# Patient Record
Sex: FEMALE | Race: WHITE | Employment: OTHER | URBAN - METROPOLITAN AREA
[De-identification: names, ages, dates, MRNs, and addresses within clinical notes are randomized per-mention and may not be internally consistent; named-entity substitution may affect disease eponyms.]

---

## 2017-01-23 ENCOUNTER — HOSPITAL ENCOUNTER (EMERGENCY)
Facility: HOSPITAL | Age: 62
Discharge: HOME | End: 2017-01-23
Attending: EMERGENCY MEDICINE
Payer: COMMERCIAL

## 2017-01-23 ENCOUNTER — APPOINTMENT (OUTPATIENT)
Dept: GENERAL RADIOLOGY | Facility: HOSPITAL | Age: 62
End: 2017-01-23
Attending: EMERGENCY MEDICINE
Payer: COMMERCIAL

## 2017-01-23 VITALS
TEMPERATURE: 98 F | RESPIRATION RATE: 18 BRPM | WEIGHT: 160 LBS | HEIGHT: 66 IN | SYSTOLIC BLOOD PRESSURE: 161 MMHG | OXYGEN SATURATION: 98 % | BODY MASS INDEX: 25.71 KG/M2 | HEART RATE: 71 BPM | DIASTOLIC BLOOD PRESSURE: 94 MMHG

## 2017-01-23 DIAGNOSIS — S16.1XXA CERVICAL STRAIN, INITIAL ENCOUNTER: ICD-10-CM

## 2017-01-23 DIAGNOSIS — S20.219A CHEST WALL CONTUSION, UNSPECIFIED LATERALITY, INITIAL ENCOUNTER: Primary | ICD-10-CM

## 2017-01-23 LAB
ANION GAP SERPL CALC-SCNC: 7 MMOL/L (ref 0–18)
BASOPHILS # BLD: 0 K/UL (ref 0–0.2)
BASOPHILS NFR BLD: 0 %
BUN SERPL-MCNC: 20 MG/DL (ref 8–20)
BUN/CREAT SERPL: 26.7 (ref 10–20)
CALCIUM SERPL-MCNC: 9 MG/DL (ref 8.5–10.5)
CHLORIDE SERPL-SCNC: 102 MMOL/L (ref 95–110)
CO2 SERPL-SCNC: 29 MMOL/L (ref 22–32)
CREAT SERPL-MCNC: 0.75 MG/DL (ref 0.5–1.5)
EOSINOPHIL # BLD: 0.1 K/UL (ref 0–0.7)
EOSINOPHIL NFR BLD: 1 %
ERYTHROCYTE [DISTWIDTH] IN BLOOD BY AUTOMATED COUNT: 13.1 % (ref 11–15)
GLUCOSE SERPL-MCNC: 119 MG/DL (ref 70–99)
HCT VFR BLD AUTO: 42 % (ref 35–48)
HGB BLD-MCNC: 14 G/DL (ref 12–16)
LYMPHOCYTES # BLD: 1.9 K/UL (ref 1–4)
LYMPHOCYTES NFR BLD: 27 %
MCH RBC QN AUTO: 31.4 PG (ref 27–32)
MCHC RBC AUTO-ENTMCNC: 33.3 G/DL (ref 32–37)
MCV RBC AUTO: 94.3 FL (ref 80–100)
MONOCYTES # BLD: 0.5 K/UL (ref 0–1)
MONOCYTES NFR BLD: 7 %
NEUTROPHILS # BLD AUTO: 4.4 K/UL (ref 1.8–7.7)
NEUTROPHILS NFR BLD: 64 %
OSMOLALITY UR CALC.SUM OF ELEC: 290 MOSM/KG (ref 275–295)
PLATELET # BLD AUTO: 259 K/UL (ref 140–400)
PMV BLD AUTO: 8 FL (ref 7.4–10.3)
POTASSIUM SERPL-SCNC: 3.8 MMOL/L (ref 3.3–5.1)
RBC # BLD AUTO: 4.46 M/UL (ref 3.7–5.4)
SODIUM SERPL-SCNC: 138 MMOL/L (ref 136–144)
TROPONIN I SERPL-MCNC: 0 NG/ML (ref ?–0.03)
WBC # BLD AUTO: 6.9 K/UL (ref 4–11)

## 2017-01-23 PROCEDURE — 80048 BASIC METABOLIC PNL TOTAL CA: CPT | Performed by: EMERGENCY MEDICINE

## 2017-01-23 PROCEDURE — 71020 XR CHEST PA + LAT CHEST (CPT=71020): CPT | Performed by: RADIOLOGY

## 2017-01-23 PROCEDURE — 99284 EMERGENCY DEPT VISIT MOD MDM: CPT

## 2017-01-23 PROCEDURE — 93010 ELECTROCARDIOGRAM REPORT: CPT | Performed by: EMERGENCY MEDICINE

## 2017-01-23 PROCEDURE — 85025 COMPLETE CBC W/AUTO DIFF WBC: CPT | Performed by: EMERGENCY MEDICINE

## 2017-01-23 PROCEDURE — 36415 COLL VENOUS BLD VENIPUNCTURE: CPT

## 2017-01-23 PROCEDURE — 84484 ASSAY OF TROPONIN QUANT: CPT | Performed by: EMERGENCY MEDICINE

## 2017-01-23 PROCEDURE — 99285 EMERGENCY DEPT VISIT HI MDM: CPT

## 2017-01-23 PROCEDURE — 72040 X-RAY EXAM NECK SPINE 2-3 VW: CPT | Performed by: RADIOLOGY

## 2017-01-23 PROCEDURE — 93005 ELECTROCARDIOGRAM TRACING: CPT

## 2017-01-23 RX ORDER — IBUPROFEN 600 MG/1
600 TABLET ORAL ONCE
Status: COMPLETED | OUTPATIENT
Start: 2017-01-23 | End: 2017-01-23
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
## 2017-02-13 ENCOUNTER — OFFICE VISIT (OUTPATIENT)
Dept: INTERNAL MEDICINE CLINIC | Facility: CLINIC | Age: 62
End: 2017-02-13

## 2017-02-13 VITALS
DIASTOLIC BLOOD PRESSURE: 88 MMHG | WEIGHT: 162 LBS | BODY MASS INDEX: 26.03 KG/M2 | TEMPERATURE: 98 F | SYSTOLIC BLOOD PRESSURE: 148 MMHG | HEART RATE: 72 BPM | OXYGEN SATURATION: 96 % | HEIGHT: 66 IN

## 2017-02-13 DIAGNOSIS — S20.211D CHEST WALL CONTUSION, RIGHT, SUBSEQUENT ENCOUNTER: ICD-10-CM
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
## 2017-02-13 DIAGNOSIS — S46.911D RIGHT SHOULDER STRAIN, SUBSEQUENT ENCOUNTER: ICD-10-CM

## 2017-02-13 DIAGNOSIS — E78.00 HYPERCHOLESTEROLEMIA: ICD-10-CM

## 2017-02-13 DIAGNOSIS — S16.1XXD CERVICAL STRAIN, SUBSEQUENT ENCOUNTER: ICD-10-CM

## 2017-02-13 PROCEDURE — 99212 OFFICE O/P EST SF 10 MIN: CPT | Performed by: INTERNAL MEDICINE

## 2017-02-13 PROCEDURE — 99214 OFFICE O/P EST MOD 30 MIN: CPT | Performed by: INTERNAL MEDICINE
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
## 2017-02-20 ENCOUNTER — APPOINTMENT (OUTPATIENT)
Dept: LAB | Age: 62
End: 2017-02-20
Attending: INTERNAL MEDICINE
Payer: COMMERCIAL

## 2017-02-20 DIAGNOSIS — E78.00 HYPERCHOLESTEROLEMIA: ICD-10-CM

## 2017-02-20 LAB
ALBUMIN SERPL BCP-MCNC: 4 G/DL (ref 3.5–4.8)
ALBUMIN/GLOB SERPL: 1.6 {RATIO} (ref 1–2)
ALP SERPL-CCNC: 56 U/L (ref 32–100)
ALT SERPL-CCNC: 19 U/L (ref 14–54)
ANION GAP SERPL CALC-SCNC: 8 MMOL/L (ref 0–18)
AST SERPL-CCNC: 19 U/L (ref 15–41)
BACTERIA UR QL AUTO: NEGATIVE /HPF
BILIRUB SERPL-MCNC: 0.8 MG/DL (ref 0.3–1.2)
BILIRUB UR QL: NEGATIVE
BUN SERPL-MCNC: 13 MG/DL (ref 8–20)
BUN/CREAT SERPL: 15.3 (ref 10–20)
CALCIUM SERPL-MCNC: 9.1 MG/DL (ref 8.5–10.5)
CHLORIDE SERPL-SCNC: 104 MMOL/L (ref 95–110)
CHOLEST SERPL-MCNC: 244 MG/DL (ref 110–200)
CO2 SERPL-SCNC: 27 MMOL/L (ref 22–32)
COLOR UR: YELLOW
CREAT SERPL-MCNC: 0.85 MG/DL (ref 0.5–1.5)
GLOBULIN PLAS-MCNC: 2.5 G/DL (ref 2.5–3.7)
GLUCOSE SERPL-MCNC: 93 MG/DL (ref 70–99)
GLUCOSE UR-MCNC: NEGATIVE MG/DL
HDLC SERPL-MCNC: 68 MG/DL
HGB UR QL STRIP.AUTO: NEGATIVE
LDLC SERPL CALC-MCNC: 161 MG/DL (ref 0–99)
LEUKOCYTE ESTERASE UR QL STRIP.AUTO: NEGATIVE
NITRITE UR QL STRIP.AUTO: NEGATIVE
NONHDLC SERPL-MCNC: 176 MG/DL
OSMOLALITY UR CALC.SUM OF ELEC: 288 MOSM/KG (ref 275–295)
PH UR: 6 [PH] (ref 5–8)
POTASSIUM SERPL-SCNC: 4.2 MMOL/L (ref 3.3–5.1)
PROT SERPL-MCNC: 6.5 G/DL (ref 5.9–8.4)
PROT UR-MCNC: 30 MG/DL
RBC #/AREA URNS AUTO: 1 /HPF
SODIUM SERPL-SCNC: 139 MMOL/L (ref 136–144)
SP GR UR STRIP: 1.02 (ref 1–1.03)
TRIGL SERPL-MCNC: 74 MG/DL (ref 1–149)
TSH SERPL-ACNC: 2.47 UIU/ML (ref 0.34–5.6)
UROBILINOGEN UR STRIP-ACNC: <2
VIT C UR-MCNC: 40 MG/DL
WBC #/AREA URNS AUTO: <1 /HPF

## 2017-02-20 PROCEDURE — 36415 COLL VENOUS BLD VENIPUNCTURE: CPT

## 2017-02-20 PROCEDURE — 80061 LIPID PANEL: CPT

## 2017-02-20 PROCEDURE — 81001 URINALYSIS AUTO W/SCOPE: CPT

## 2017-02-20 PROCEDURE — 84443 ASSAY THYROID STIM HORMONE: CPT

## 2017-02-20 PROCEDURE — 80053 COMPREHEN METABOLIC PANEL: CPT

## 2017-02-21 ENCOUNTER — TELEPHONE (OUTPATIENT)
Dept: INTERNAL MEDICINE CLINIC | Facility: CLINIC | Age: 62
End: 2017-02-21

## 2017-02-21 NOTE — TELEPHONE ENCOUNTER
To nursing,   please tell patient lab done on 2/20/17 CMP TSH lipid UA–results are ok except a minor amount of protein in urine (30 mg/dl) and elevated cholesterol. I will just repeat UA when I see her on 3/13/17.    Cholesterol is elevated at 244 with lynnette

## 2017-03-01 ENCOUNTER — HOSPITAL ENCOUNTER (OUTPATIENT)
Dept: MAMMOGRAPHY | Facility: HOSPITAL | Age: 62
Discharge: HOME | End: 2017-03-01
Attending: OBSTETRICS & GYNECOLOGY
Payer: COMMERCIAL

## 2017-03-01 DIAGNOSIS — Z12.31 VISIT FOR SCREENING MAMMOGRAM: ICD-10-CM

## 2017-03-01 PROCEDURE — 77067 SCR MAMMO BI INCL CAD: CPT

## 2017-03-13 ENCOUNTER — OFFICE VISIT (OUTPATIENT)
Dept: INTERNAL MEDICINE CLINIC | Facility: CLINIC | Age: 62
End: 2017-03-13

## 2017-03-13 ENCOUNTER — APPOINTMENT (OUTPATIENT)
Dept: LAB | Age: 62
End: 2017-03-13
Attending: INTERNAL MEDICINE
Payer: COMMERCIAL

## 2017-03-13 ENCOUNTER — TELEPHONE (OUTPATIENT)
Dept: INTERNAL MEDICINE CLINIC | Facility: CLINIC | Age: 62
End: 2017-03-13

## 2017-03-13 VITALS
SYSTOLIC BLOOD PRESSURE: 160 MMHG | OXYGEN SATURATION: 96 % | HEIGHT: 66 IN | HEART RATE: 71 BPM | BODY MASS INDEX: 26.03 KG/M2 | DIASTOLIC BLOOD PRESSURE: 90 MMHG | TEMPERATURE: 98 F | WEIGHT: 162 LBS

## 2017-03-13 DIAGNOSIS — R82.90 ABNORMAL URINALYSIS: ICD-10-CM

## 2017-03-13 DIAGNOSIS — S46.911D RIGHT SHOULDER STRAIN, SUBSEQUENT ENCOUNTER: ICD-10-CM

## 2017-03-13 DIAGNOSIS — S16.1XXD CERVICAL STRAIN, SUBSEQUENT ENCOUNTER: ICD-10-CM

## 2017-03-13 DIAGNOSIS — E78.00 HYPERCHOLESTEROLEMIA: ICD-10-CM
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
## 2017-03-13 LAB
BILIRUB UR QL: NEGATIVE
COLOR UR: YELLOW
GLUCOSE UR-MCNC: NEGATIVE MG/DL
HGB UR QL STRIP.AUTO: NEGATIVE
LEUKOCYTE ESTERASE UR QL STRIP.AUTO: NEGATIVE
NITRITE UR QL STRIP.AUTO: NEGATIVE
PH UR: 6 [PH] (ref 5–8)
PROT UR-MCNC: NEGATIVE MG/DL
RBC #/AREA URNS AUTO: 2 /HPF
SP GR UR STRIP: 1.02 (ref 1–1.03)
UROBILINOGEN UR STRIP-ACNC: <2
VIT C UR-MCNC: 40 MG/DL
WBC #/AREA URNS AUTO: 1 /HPF

## 2017-03-13 PROCEDURE — 99212 OFFICE O/P EST SF 10 MIN: CPT | Performed by: INTERNAL MEDICINE

## 2017-03-13 PROCEDURE — 81003 URINALYSIS AUTO W/O SCOPE: CPT

## 2017-03-13 PROCEDURE — 99213 OFFICE O/P EST LOW 20 MIN: CPT | Performed by: INTERNAL MEDICINE

## 2017-03-13 NOTE — TELEPHONE ENCOUNTER
To nursing, please tell patient results of urinalysis done today 3/13/17–results are okay. No protein in the urine. Thanks.
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
## 2018-03-25 ENCOUNTER — HOSPITAL ENCOUNTER (OUTPATIENT)
Dept: MAMMOGRAPHY | Facility: HOSPITAL | Age: 63
Discharge: HOME | End: 2018-03-25
Attending: OBSTETRICS & GYNECOLOGY
Payer: COMMERCIAL

## 2018-03-25 DIAGNOSIS — Z12.31 VISIT FOR SCREENING MAMMOGRAM: ICD-10-CM

## 2018-03-25 PROCEDURE — 77067 SCR MAMMO BI INCL CAD: CPT | Performed by: OBSTETRICS & GYNECOLOGY

## 2018-04-25 ENCOUNTER — OFFICE VISIT (OUTPATIENT)
Dept: INTERNAL MEDICINE CLINIC | Facility: CLINIC | Age: 63
End: 2018-04-25

## 2018-04-25 VITALS
DIASTOLIC BLOOD PRESSURE: 98 MMHG | BODY MASS INDEX: 27.93 KG/M2 | OXYGEN SATURATION: 98 % | TEMPERATURE: 99 F | SYSTOLIC BLOOD PRESSURE: 152 MMHG | HEART RATE: 79 BPM | WEIGHT: 173.81 LBS | HEIGHT: 66 IN

## 2018-04-25 DIAGNOSIS — E78.00 HYPERCHOLESTEROLEMIA: ICD-10-CM

## 2018-04-25 DIAGNOSIS — I10 HYPERTENSION, ESSENTIAL: ICD-10-CM

## 2018-04-25 DIAGNOSIS — Z00.00 ANNUAL PHYSICAL EXAM: Primary | ICD-10-CM

## 2018-04-25 DIAGNOSIS — H91.93 HEARING DIFFICULTY OF BOTH EARS: ICD-10-CM

## 2018-04-25 PROCEDURE — 99396 PREV VISIT EST AGE 40-64: CPT | Performed by: INTERNAL MEDICINE

## 2018-04-25 RX ORDER — AMLODIPINE BESYLATE 5 MG/1
5 TABLET ORAL DAILY
Qty: 90 TABLET | Refills: 3 | Status: SHIPPED | OUTPATIENT
Start: 2018-04-25 | End: 2018-05-23

## 2018-04-25 NOTE — PROGRESS NOTES
Randi Hubbard is a 57year old female who presents for     Annual physical    Elevated blood pressure--  Saw Dr Ness Ortiz last month and pt notes her BP there was 188/92. She recom pt see her PCP. Pt has been checking home BPs. 156/90s.  Her mom and brother w Cigarettes  Smokeless tobacco: Never Used                      Alcohol use:  Yes              Comment: 2 DRINKS A DAY         Allergies:    Bactrim [Sulfametho*    Rash    Review of systems:  Constitutional:  No fever, loss of appetite or unintentional weig 9/2013--recom shingrix-VIS provided. Flu shot in fall.         Lab 2/20/17 CMP TSH lipid UA– ok exc UA with 30 mg/dl protein  and . CBC nl ER 1/23/17. repeat UA 3/13/17-ok. Lab orders for -cbc cmp tsh lipid ua w reflex. Ret in 1 mo.  Bring home

## 2018-05-02 ENCOUNTER — LAB ENCOUNTER (OUTPATIENT)
Dept: LAB | Age: 63
End: 2018-05-02
Attending: INTERNAL MEDICINE
Payer: COMMERCIAL

## 2018-05-02 DIAGNOSIS — Z00.00 ANNUAL PHYSICAL EXAM: ICD-10-CM

## 2018-05-02 PROCEDURE — 81003 URINALYSIS AUTO W/O SCOPE: CPT | Performed by: INTERNAL MEDICINE

## 2018-05-02 PROCEDURE — 36415 COLL VENOUS BLD VENIPUNCTURE: CPT

## 2018-05-02 PROCEDURE — 84443 ASSAY THYROID STIM HORMONE: CPT

## 2018-05-02 PROCEDURE — 85025 COMPLETE CBC W/AUTO DIFF WBC: CPT

## 2018-05-02 PROCEDURE — 80053 COMPREHEN METABOLIC PANEL: CPT

## 2018-05-02 PROCEDURE — 80061 LIPID PANEL: CPT

## 2018-05-03 ENCOUNTER — TELEPHONE (OUTPATIENT)
Dept: INTERNAL MEDICINE CLINIC | Facility: CLINIC | Age: 63
End: 2018-05-03

## 2018-05-03 NOTE — TELEPHONE ENCOUNTER
To nursing, please tell pt lab done on 5/2/18--cbc cmp tsh lipid ua-results ok. See me 5/23/18 as planned. Thanks.

## 2018-05-03 NOTE — TELEPHONE ENCOUNTER
calledpatient , spoke with spouse pr hipaa and relayed DR. CHEEMA message- verbalized understanding and will relay message to spouse

## 2018-05-12 ENCOUNTER — TELEPHONE (OUTPATIENT)
Dept: INTERNAL MEDICINE CLINIC | Facility: CLINIC | Age: 63
End: 2018-05-12

## 2018-05-12 NOTE — TELEPHONE ENCOUNTER
To nursing, please tell patient personally I received results of the colo-guard stool test she sent in from 5/2/18. The colo-guard test is positive. A positive result could indicate the presence of colorectal cancer or precancer.   It is important that

## 2018-05-14 NOTE — TELEPHONE ENCOUNTER
Called and Relayed MD's message to patient---verbalized understanding. Contact information given over the phone for DR. Cazares and contact information provided via [facility] for Dr. Criss Avendaño to schedule colonoscopy asap

## 2018-05-21 ENCOUNTER — OFFICE VISIT (OUTPATIENT)
Dept: AUDIOLOGY | Facility: CLINIC | Age: 63
End: 2018-05-21

## 2018-05-21 DIAGNOSIS — H90.3 SENSORINEURAL HEARING LOSS, BILATERAL: Primary | ICD-10-CM

## 2018-05-21 PROCEDURE — 92567 TYMPANOMETRY: CPT | Performed by: AUDIOLOGIST

## 2018-05-21 PROCEDURE — 92557 COMPREHENSIVE HEARING TEST: CPT | Performed by: AUDIOLOGIST

## 2018-05-21 NOTE — PATIENT INSTRUCTIONS
How Hearing Aids Can Help You    If you’re losing your hearing, it can be frustrating: But, hearing aids can help you hear what Ameena Peng been missing. Not everyone who has hearing loss needs hearing aids.  But if your hearing loss is keeping you from commun © 5164-2017 [address] [facility] [place] [facility] [address]. All rights reserved. This information is not intended as a substitute for professional medical care. Always follow your healthcare professional's instructions.

## 2018-05-21 NOTE — PROGRESS NOTES
AUDIOGRAM     Nina Wright was referred for testing by Tabby Erickson for decreased hearing in both ears. 9/28/1955  [id number]      History  Mrs. Laina Noel is here today for an audiological evaluation on referral from Dr. Brandt Brown.   She repor hearing aids. Once we receive that information, we will call and see if she'd like to move forward with a hearing aid evaluation.     5/21/2018  Ad Llamas

## 2018-05-23 ENCOUNTER — TELEPHONE (OUTPATIENT)
Dept: INTERNAL MEDICINE CLINIC | Facility: CLINIC | Age: 63
End: 2018-05-23

## 2018-05-23 ENCOUNTER — OFFICE VISIT (OUTPATIENT)
Dept: INTERNAL MEDICINE CLINIC | Facility: CLINIC | Age: 63
End: 2018-05-23

## 2018-05-23 VITALS
WEIGHT: 175 LBS | HEIGHT: 66 IN | BODY MASS INDEX: 28.13 KG/M2 | TEMPERATURE: 99 F | HEART RATE: 88 BPM | DIASTOLIC BLOOD PRESSURE: 76 MMHG | SYSTOLIC BLOOD PRESSURE: 130 MMHG

## 2018-05-23 DIAGNOSIS — I10 HYPERTENSION, ESSENTIAL: Primary | ICD-10-CM

## 2018-05-23 DIAGNOSIS — R19.5 POSITIVE COLORECTAL CANCER SCREENING USING COLOGUARD TEST: ICD-10-CM

## 2018-05-23 DIAGNOSIS — E78.00 HYPERCHOLESTEROLEMIA: ICD-10-CM

## 2018-05-23 PROCEDURE — 99213 OFFICE O/P EST LOW 20 MIN: CPT | Performed by: INTERNAL MEDICINE

## 2018-05-23 PROCEDURE — 99212 OFFICE O/P EST SF 10 MIN: CPT | Performed by: INTERNAL MEDICINE

## 2018-05-23 RX ORDER — AMLODIPINE BESYLATE 5 MG/1
7.5 TABLET ORAL DAILY
Qty: 135 TABLET | Refills: 3 | Status: SHIPPED | OUTPATIENT
Start: 2018-05-23 | End: 2019-05-18

## 2018-05-23 NOTE — TELEPHONE ENCOUNTER
Ender Fearing from [facility] office was returning Sakshiss Hair call

## 2018-05-23 NOTE — PROGRESS NOTES
Jovanna Velazquez is a 57year old female who presents for     1 mo check.      HTN-tolerating addition norvasc 5 mg daily. Home BPs -14 readings brought in. Of 14 readings 5 are in the 140/90 range. The other readings are 120s to 130s over 80s.       Saw Packs/day: 0.00      Years: 5.00         Types: Cigarettes  Smokeless tobacco: Never Used                      Alcohol use:  Yes              Comment: 2 DRINKS A DAY         Allergies:    Bactrim [Sulfametho*    RA 3/13/17-ok. Lab 5/2/18--cbc cmp tsh lipid ua-results ok     Ret in 6 mo.      Patient expresses understanding of above issues and plan.          Current Outpatient Prescriptions:   AmLODIPine Besylate 5 MG Oral Tab Take 1.5 tablets (7.5 mg total) by mouth

## 2018-05-23 NOTE — TELEPHONE ENCOUNTER
Spoke to Carmen at Dr. Sung Peak office. They will  Move up colonoscopy to June 14th arrive at [address] [place] [address] office. Patient notified and agrees.

## 2018-06-07 ENCOUNTER — TELEPHONE (OUTPATIENT)
Dept: AUDIOLOGY | Facility: CLINIC | Age: 63
End: 2018-06-07

## 2018-06-14 PROCEDURE — 88305 TISSUE EXAM BY PATHOLOGIST: CPT | Performed by: INTERNAL MEDICINE

## 2018-10-26 ENCOUNTER — TELEPHONE (OUTPATIENT)
Dept: INTERNAL MEDICINE CLINIC | Facility: CLINIC | Age: 63
End: 2018-10-26

## 2018-10-26 ENCOUNTER — HOSPITAL ENCOUNTER (OUTPATIENT)
Dept: GENERAL RADIOLOGY | Facility: HOSPITAL | Age: 63
Discharge: HOME | End: 2018-10-26
Attending: INTERNAL MEDICINE
Payer: MEDICARE

## 2018-10-26 ENCOUNTER — HOSPITAL ENCOUNTER (OUTPATIENT)
Dept: ULTRASOUND IMAGING | Facility: HOSPITAL | Age: 63
Discharge: HOME | End: 2018-10-26
Attending: INTERNAL MEDICINE | Admitting: INTERNAL MEDICINE
Payer: MEDICARE

## 2018-10-26 ENCOUNTER — HOSPITAL ENCOUNTER (OUTPATIENT)
Dept: ULTRASOUND IMAGING | Facility: HOSPITAL | Age: 63
Discharge: HOME | End: 2018-10-26
Attending: INTERNAL MEDICINE
Payer: MEDICARE

## 2018-10-26 ENCOUNTER — OFFICE VISIT (OUTPATIENT)
Dept: INTERNAL MEDICINE CLINIC | Facility: CLINIC | Age: 63
End: 2018-10-26
Payer: MEDICARE

## 2018-10-26 VITALS
HEIGHT: 66 IN | WEIGHT: 180 LBS | BODY MASS INDEX: 28.93 KG/M2 | DIASTOLIC BLOOD PRESSURE: 70 MMHG | TEMPERATURE: 98 F | SYSTOLIC BLOOD PRESSURE: 120 MMHG | HEART RATE: 76 BPM

## 2018-10-26 DIAGNOSIS — M25.562 ACUTE PAIN OF LEFT KNEE: Primary | ICD-10-CM

## 2018-10-26 DIAGNOSIS — I10 HYPERTENSION, ESSENTIAL: ICD-10-CM

## 2018-10-26 DIAGNOSIS — M25.462 PAIN AND SWELLING OF LEFT KNEE: ICD-10-CM

## 2018-10-26 DIAGNOSIS — M25.562 PAIN AND SWELLING OF LEFT KNEE: ICD-10-CM

## 2018-10-26 DIAGNOSIS — M25.562 ACUTE PAIN OF LEFT KNEE: ICD-10-CM

## 2018-10-26 DIAGNOSIS — L98.9 SKIN LESION OF LEFT LEG: ICD-10-CM

## 2018-10-26 PROCEDURE — G0463 HOSPITAL OUTPT CLINIC VISIT: HCPCS | Performed by: INTERNAL MEDICINE

## 2018-10-26 PROCEDURE — 99214 OFFICE O/P EST MOD 30 MIN: CPT | Performed by: INTERNAL MEDICINE

## 2018-10-26 PROCEDURE — 93971 EXTREMITY STUDY: CPT | Performed by: INTERNAL MEDICINE

## 2018-10-26 PROCEDURE — 73564 X-RAY EXAM KNEE 4 OR MORE: CPT | Performed by: INTERNAL MEDICINE

## 2018-10-26 RX ORDER — METHYLPREDNISOLONE 4 MG/1
TABLET ORAL
Qty: 1 KIT | Refills: 0 | Status: SHIPPED | OUTPATIENT
Start: 2018-10-26 | End: 2018-12-20

## 2018-10-26 NOTE — TELEPHONE ENCOUNTER
Pilar Jordan / Registration calling for new Diagnonsis code for U/S Venous Dopp leg, the current code will not pass Medicare  Code R06.9 will pass. Pt is there now.  Tasked to nursing high

## 2018-10-26 NOTE — TELEPHONE ENCOUNTER
I discussed with patient when ultrasound called with results–Venous Doppler left leg today 10/26/18 no DVT, probable Baker's cyst 4.1 cm. She will go ahead and see Dr. Nannette Murillo.

## 2018-10-26 NOTE — PROGRESS NOTES
Shantanu Hutson is a 70year old female who presents for     Check at 5 months. L knee pain for 2 wks. \"it throbs\" medially and in back of knee. The rest of knee is \"sore. \"  No fall or injury. Worse w walking and going sitting to standing.  No giv Brother    • Other (no children) Other    • Stroke Maternal Grandmother          age 64      Social History:   Social History    Tobacco Use      Smoking status: Former Smoker        Years: 5.00        Types: Cigarettes      Smokeless tobacco: Never Us x6, diverticulosis and melanosis coli. To repeat at 5 yrs. Gyn Dr. Al Maciel. She prescribes estrogen patch and lexapro for depression. Laurie Cr / Herev Win by Dr. Al Maciel. Vaccines-tdap 9/09. Had zostavax vacc 9/2013--recom shingrix.  Flu shot had in fall 2018

## 2018-10-29 NOTE — TELEPHONE ENCOUNTER
To nursing, please tell patient x-ray left knee 10/26/18 shows small amount of fluid in the knee. Go ahead and see Dr. Ab Kenny as we discussed. Thanks. Note to self–x-ray left knee 10/26/18–small left suprapatellar effusion.

## 2018-12-19 ENCOUNTER — TELEPHONE (OUTPATIENT)
Dept: INTERNAL MEDICINE CLINIC | Facility: CLINIC | Age: 63
End: 2018-12-19

## 2018-12-19 NOTE — TELEPHONE ENCOUNTER
I spoke with patient and she says she woke up with dizziness and felt like the room was spinning. She has felt this sensation on and off throughout the day. She has been nauseated at times. Patient says this has never happened before. She has not fallen.  Matt Blunt

## 2018-12-19 NOTE — TELEPHONE ENCOUNTER
Pt. Called stating when she woke up today she had a bad bout of dizziness and nausea. She laid back down. Still having bouts of dizziness. Asking to be seen.

## 2018-12-20 ENCOUNTER — OFFICE VISIT (OUTPATIENT)
Dept: INTERNAL MEDICINE CLINIC | Facility: CLINIC | Age: 63
End: 2018-12-20
Payer: MEDICARE

## 2018-12-20 VITALS
DIASTOLIC BLOOD PRESSURE: 70 MMHG | SYSTOLIC BLOOD PRESSURE: 126 MMHG | HEART RATE: 72 BPM | TEMPERATURE: 98 F | WEIGHT: 177 LBS | BODY MASS INDEX: 29 KG/M2

## 2018-12-20 DIAGNOSIS — I10 HYPERTENSION, ESSENTIAL: ICD-10-CM

## 2018-12-20 DIAGNOSIS — H91.93 HEARING DIFFICULTY OF BOTH EARS: ICD-10-CM

## 2018-12-20 DIAGNOSIS — H81.10 BENIGN PAROXYSMAL POSITIONAL VERTIGO, UNSPECIFIED LATERALITY: Primary | ICD-10-CM

## 2018-12-20 PROCEDURE — G0463 HOSPITAL OUTPT CLINIC VISIT: HCPCS | Performed by: INTERNAL MEDICINE

## 2018-12-20 PROCEDURE — 99214 OFFICE O/P EST MOD 30 MIN: CPT | Performed by: INTERNAL MEDICINE

## 2018-12-20 NOTE — PROGRESS NOTES
Nura Marie is a 70year old female.     HPI:   Patient presents with:  Dizziness      69 y/o F with 1 d h/o dizziness; occurred when awoke from bed; +nausea; lasted for a few hours; no emesis; no weakness to arms or legs; no diplopia; no sinus or niranjan MG Oral Tab Take 1.5 tablets (7.5 mg total) by mouth daily. Disp: 135 tablet Rfl: 3   DHA-EPA-Coenzyme Q10-Vitamin E (COQ-10 & FISH OIL OR) Take 1 tablet by mouth daily.  Disp:  Rfl:    Glucosamine-Chondroitin (GLUCOSAMINE CHONDR COMPLEX OR) Take 1 tablet b seconds; no lateralization         ASSESSMENT/PLAN:   BPPV  Advise meclizine 25 mg po TID prn    HTN  On Norvasc 7.5 mg op qD    SNHL  saw audiologist Chris Tariq 5/21/18-sensorineural HL--she said exc candidate for HA's.          Orders This Visit:  No or

## 2019-05-24 DIAGNOSIS — I10 ESSENTIAL HYPERTENSION: Primary | ICD-10-CM

## 2019-05-24 DIAGNOSIS — E78.00 HYPERCHOLESTEROLEMIA: ICD-10-CM

## 2019-05-24 RX ORDER — AMLODIPINE BESYLATE 5 MG/1
TABLET ORAL
Qty: 135 TABLET | Refills: 0 | Status: SHIPPED | OUTPATIENT
Start: 2019-05-24 | End: 2019-05-31

## 2019-05-24 NOTE — TELEPHONE ENCOUNTER
Contacted patient  Schedule appt with Dr Lyman Doing on 5/31  Can pt get a refill sent to pharmacy today    Routed message back to rx

## 2019-05-24 NOTE — TELEPHONE ENCOUNTER
Rx sent for 90 day supply. Patient also due for labs. Labs pended for Dr. Kennedi Reno review.      To Dr. Arthor Bernheim.

## 2019-05-24 NOTE — TELEPHONE ENCOUNTER
Patient notified of Rx and fasting lab orders via cell VM per HIPAA. Instructed patient to call with questions.

## 2019-05-24 NOTE — TELEPHONE ENCOUNTER
To nursing, please tell patient orders are in the system for her to do fasting blood and urine tests before 5/31/19 visit–CBC CMP TSH lipid UA with reflex. Thanks. - CBC WITH DIFFERENTIAL WITH PLATELET; Future  - COMP METABOLIC PANEL (14);  Future  -

## 2019-05-29 ENCOUNTER — LAB ENCOUNTER (OUTPATIENT)
Dept: LAB | Age: 64
End: 2019-05-29
Attending: INTERNAL MEDICINE
Payer: MEDICARE

## 2019-05-29 DIAGNOSIS — I10 ESSENTIAL HYPERTENSION: ICD-10-CM

## 2019-05-29 PROCEDURE — 81001 URINALYSIS AUTO W/SCOPE: CPT | Performed by: INTERNAL MEDICINE

## 2019-05-29 PROCEDURE — 84443 ASSAY THYROID STIM HORMONE: CPT

## 2019-05-29 PROCEDURE — 85025 COMPLETE CBC W/AUTO DIFF WBC: CPT

## 2019-05-29 PROCEDURE — 80053 COMPREHEN METABOLIC PANEL: CPT

## 2019-05-29 PROCEDURE — 36415 COLL VENOUS BLD VENIPUNCTURE: CPT

## 2019-05-29 PROCEDURE — 80061 LIPID PANEL: CPT

## 2019-05-31 ENCOUNTER — OFFICE VISIT (OUTPATIENT)
Dept: INTERNAL MEDICINE CLINIC | Facility: CLINIC | Age: 64
End: 2019-05-31
Payer: MEDICARE

## 2019-05-31 VITALS
DIASTOLIC BLOOD PRESSURE: 74 MMHG | SYSTOLIC BLOOD PRESSURE: 122 MMHG | TEMPERATURE: 99 F | HEIGHT: 66 IN | HEART RATE: 76 BPM | BODY MASS INDEX: 26.52 KG/M2 | WEIGHT: 165 LBS

## 2019-05-31 DIAGNOSIS — F32.A MILD DEPRESSION: ICD-10-CM

## 2019-05-31 DIAGNOSIS — M17.12 PRIMARY OSTEOARTHRITIS OF LEFT KNEE: ICD-10-CM

## 2019-05-31 DIAGNOSIS — Z12.31 VISIT FOR SCREENING MAMMOGRAM: ICD-10-CM

## 2019-05-31 DIAGNOSIS — I10 HYPERTENSION, ESSENTIAL: Primary | ICD-10-CM

## 2019-05-31 PROCEDURE — 90670 PCV13 VACCINE IM: CPT | Performed by: INTERNAL MEDICINE

## 2019-05-31 PROCEDURE — 99214 OFFICE O/P EST MOD 30 MIN: CPT | Performed by: INTERNAL MEDICINE

## 2019-05-31 PROCEDURE — G0463 HOSPITAL OUTPT CLINIC VISIT: HCPCS | Performed by: INTERNAL MEDICINE

## 2019-05-31 PROCEDURE — G0009 ADMIN PNEUMOCOCCAL VACCINE: HCPCS | Performed by: INTERNAL MEDICINE

## 2019-05-31 RX ORDER — AMLODIPINE BESYLATE 5 MG/1
TABLET ORAL
Qty: 90 TABLET | Refills: 3 | Status: SHIPPED | OUTPATIENT
Start: 2019-05-31 | End: 2019-08-02

## 2019-05-31 RX ORDER — ESCITALOPRAM OXALATE 10 MG/1
10 TABLET ORAL
Qty: 90 TABLET | Refills: 3 | Status: SHIPPED | OUTPATIENT
Start: 2019-05-31 | End: 2020-06-03

## 2019-05-31 NOTE — PROGRESS NOTES
Alba Lassiter is a 70year old female who presents for     Check at 7 months.     HTN-home BPs not checked. Lost 20# since Feb by Darron Carson. Would like to get off  amlodipine 7.5 mg daily.      L knee pain is better.    x-ray left knee 10/26/18–small Grandmother          age 62      Social History:   Social History    Tobacco Use      Smoking status: Former Smoker        Years: 5.00        Types: Cigarettes      Smokeless tobacco: Never Used    Alcohol use: Yes      Comment: 2 DRINKS A DAY    Drug on 5/2/18.     Hearing difficulty--audiologist Johanna Zuniga 5/21/18-sensorineural HL & candidate for HA's.     Depression, mild--on lexapro 10 mg daily for depression since age 44 when had hyst. Wants to continue and asks renewal here (instead of Dr. Samir Lewis wh Darryn Uribe MD  5/31/2019

## 2019-06-16 ENCOUNTER — HOSPITAL ENCOUNTER (OUTPATIENT)
Dept: MAMMOGRAPHY | Facility: HOSPITAL | Age: 64
Discharge: HOME | End: 2019-06-16
Attending: INTERNAL MEDICINE
Payer: MEDICARE

## 2019-06-16 DIAGNOSIS — Z12.31 VISIT FOR SCREENING MAMMOGRAM: ICD-10-CM

## 2019-06-16 PROCEDURE — 77063 BREAST TOMOSYNTHESIS BI: CPT | Performed by: INTERNAL MEDICINE

## 2019-06-16 PROCEDURE — 77067 SCR MAMMO BI INCL CAD: CPT | Performed by: INTERNAL MEDICINE

## 2019-08-02 ENCOUNTER — OFFICE VISIT (OUTPATIENT)
Dept: DERMATOLOGY CLINIC | Facility: CLINIC | Age: 64
End: 2019-08-02
Payer: MEDICARE

## 2019-08-02 DIAGNOSIS — D23.4 BENIGN NEOPLASM OF SCALP AND SKIN OF NECK: ICD-10-CM

## 2019-08-02 DIAGNOSIS — D23.60 BENIGN NEOPLASM OF SKIN OF UPPER LIMB, INCLUDING SHOULDER, UNSPECIFIED LATERALITY: ICD-10-CM

## 2019-08-02 DIAGNOSIS — D23.70 BENIGN NEOPLASM OF SKIN OF LOWER LIMB, INCLUDING HIP, UNSPECIFIED LATERALITY: ICD-10-CM

## 2019-08-02 DIAGNOSIS — D48.5 NEOPLASM OF UNCERTAIN BEHAVIOR OF SKIN: Primary | ICD-10-CM

## 2019-08-02 DIAGNOSIS — D23.30 BENIGN NEOPLASM OF SKIN OF FACE: ICD-10-CM

## 2019-08-02 DIAGNOSIS — D23.5 BENIGN NEOPLASM OF SKIN OF TRUNK, EXCEPT SCROTUM: ICD-10-CM

## 2019-08-02 PROCEDURE — 11102 TANGNTL BX SKIN SINGLE LES: CPT | Performed by: DERMATOLOGY

## 2019-08-02 PROCEDURE — 88305 TISSUE EXAM BY PATHOLOGIST: CPT | Performed by: DERMATOLOGY

## 2019-08-02 PROCEDURE — G0463 HOSPITAL OUTPT CLINIC VISIT: HCPCS | Performed by: DERMATOLOGY

## 2019-08-02 PROCEDURE — 99202 OFFICE O/P NEW SF 15 MIN: CPT | Performed by: DERMATOLOGY

## 2019-08-06 NOTE — PROGRESS NOTES
The pathology report from last visit showed  left lateral thigh -Dermatofibroma   . Please log in test results, send biopsy results letter. Pt to rtc 6mos or prn.

## 2019-08-12 NOTE — PROGRESS NOTES
Jaiden Rojo is a 70year old female. HPI:     CC:  Patient presents with:  Derm Problem: New pt. pt presenting with lesion to L side of thigh for a year. c/o itching around the area. pt concerned that lesion has changed in size.  Denies personal HX o No         Current Outpatient Medications:  escitalopram 10 MG Oral Tab Take 1 tablet (10 mg total) by mouth once daily. Disp: 90 tablet Rfl: 3   DHA-EPA-Coenzyme Q10-Vitamin E (COQ-10 & FISH OIL OR) Take 1 tablet by mouth daily.  Disp:  Rfl:    Glucosamine file      Highest education level: Not on file    Occupational History      Not on file    Social Needs      Financial resource strain: Not on file      Food insecurity:        Worry: Not on file        Inability: Not on file      Transportation needs: Narrative      Not on file    Family History   Problem Relation Age of Onset   • Cancer Father 71         age 71 lung cancer (smoker)   • Heart Disorder Mother 78         age 78 after thoracic aortic aneurysm   • Hypertension Mother    • Other (cesar papules scattered, cherry-red vascular papules scattered. See map today's date for lesions noted . Otherwise remarkable for lesions as noted on map.   See details of examination  See Assessment /Plan for additional history and physical exam also: these issues and agrees to the plan. The patient is asked to return as noted in follow-up/ above. This note was generated using Dragon voice recognition software. Please contact me regarding any confusion resulting from errors in recognition.

## 2019-08-12 NOTE — PROGRESS NOTES
Operative Report                     Shave/  Tangential biopsy     Clinical diagnosis:    Size of lesion:  bled in the past, fhx skin cancer  Spec 1 Description >>>>>: left lateral thigh  Spec 1 Comment: dermatofibroma vs other 1cm pink nodule with s

## 2019-08-25 RX ORDER — AMLODIPINE BESYLATE 5 MG/1
TABLET ORAL
Qty: 135 TABLET | Refills: 0 | OUTPATIENT
Start: 2019-08-25

## 2019-09-09 ENCOUNTER — HOSPITAL ENCOUNTER (OUTPATIENT)
Dept: CT IMAGING | Facility: HOSPITAL | Age: 64
Discharge: HOME | End: 2019-09-09
Attending: INTERNAL MEDICINE

## 2019-09-09 DIAGNOSIS — Z13.6 SCREENING FOR CARDIOVASCULAR CONDITION: ICD-10-CM

## 2019-09-09 NOTE — PROGRESS NOTES
Pt seen at [facility] for CTHS:  PRELIMINARY SCORE=16.2    GI=569/78    Discussed previous Cholesterol values from 5/2109 labs. All results and risk factors discussed with patient; all questions and concerns addressed.   Educational handouts provi

## 2019-09-12 ENCOUNTER — TELEPHONE (OUTPATIENT)
Dept: INTERNAL MEDICINE CLINIC | Facility: CLINIC | Age: 64
End: 2019-09-12

## 2019-09-13 NOTE — TELEPHONE ENCOUNTER
To nursing, please tell pt CT heart calcium score 9/9/19 result is ok---is in a favorable low range at 16.2. Thanks. Note to self--Overread ok.

## 2019-10-07 ENCOUNTER — NURSE ONLY (OUTPATIENT)
Dept: INTERNAL MEDICINE CLINIC | Facility: CLINIC | Age: 64
End: 2019-10-07
Payer: MEDICARE

## 2019-10-07 DIAGNOSIS — Z23 NEED FOR INFLUENZA VACCINATION: Primary | ICD-10-CM

## 2019-10-07 PROCEDURE — 90662 IIV NO PRSV INCREASED AG IM: CPT | Performed by: INTERNAL MEDICINE

## 2019-10-07 PROCEDURE — G0008 ADMIN INFLUENZA VIRUS VAC: HCPCS | Performed by: INTERNAL MEDICINE

## 2019-10-07 NOTE — PROGRESS NOTES
Patient presents for annual flu vaccine. Name.  verified. Flu consent completed. Fluzone administered to LT deltoid, tolerated well.

## 2019-11-18 RX ORDER — AMLODIPINE BESYLATE 5 MG/1
TABLET ORAL
Qty: 30 TABLET | Refills: 0 | Status: SHIPPED | OUTPATIENT
Start: 2019-11-18 | End: 2019-12-20

## 2019-11-18 NOTE — TELEPHONE ENCOUNTER
Per 5/31/19 ov:Hypertension-Norvasc 7.5 mg daily. Lost wt and would like to get off norvasc if possible. Cut norvasc 5 mg daily for 2 wks, then if BP remains <140 sys, cut to 2.5 mg daily for 2wks, then off.      Called patient to confirm she completed ta

## 2019-12-20 RX ORDER — AMLODIPINE BESYLATE 2.5 MG/1
TABLET ORAL
Qty: 90 TABLET | Refills: 3 | Status: SHIPPED | OUTPATIENT
Start: 2019-12-20 | End: 2020-12-01

## 2019-12-20 NOTE — TELEPHONE ENCOUNTER
Pt. Is calling back her tapering off isn't working when she reduces her dosage her BP goes 140 and above. When she takes 1/2 tablet her bp goes to 130 pt. Would like a new Rx for 1/2 tab Amlodipine  ph.  # [phone]

## 2019-12-20 NOTE — TELEPHONE ENCOUNTER
To Dr. Aj Avila----    Rx pended for review. Per pt note below:   \"Pt. Is calling back her tapering off isn't working when she reduces her dosage her BP goes 140 and above. When she takes 1/2 tablet her bp goes to 130 pt.  Would like a new Rx for 1/2 tab Amlodipi

## 2020-05-18 RX ORDER — ESCITALOPRAM OXALATE 10 MG/1
TABLET ORAL
Qty: 90 TABLET | Refills: 3 | OUTPATIENT
Start: 2020-05-18

## 2020-06-03 ENCOUNTER — OFFICE VISIT (OUTPATIENT)
Dept: INTERNAL MEDICINE CLINIC | Facility: CLINIC | Age: 65
End: 2020-06-03
Payer: MEDICARE

## 2020-06-03 VITALS
HEART RATE: 72 BPM | OXYGEN SATURATION: 96 % | DIASTOLIC BLOOD PRESSURE: 70 MMHG | TEMPERATURE: 98 F | HEIGHT: 66 IN | BODY MASS INDEX: 27 KG/M2 | WEIGHT: 168 LBS | SYSTOLIC BLOOD PRESSURE: 124 MMHG | RESPIRATION RATE: 16 BRPM

## 2020-06-03 DIAGNOSIS — Z12.31 VISIT FOR SCREENING MAMMOGRAM: ICD-10-CM

## 2020-06-03 DIAGNOSIS — E78.00 HYPERCHOLESTEROLEMIA: ICD-10-CM

## 2020-06-03 DIAGNOSIS — M17.12 PRIMARY OSTEOARTHRITIS OF LEFT KNEE: ICD-10-CM

## 2020-06-03 DIAGNOSIS — Z00.00 MEDICARE ANNUAL WELLNESS VISIT, INITIAL: Primary | ICD-10-CM

## 2020-06-03 DIAGNOSIS — F32.A MILD DEPRESSION: ICD-10-CM

## 2020-06-03 DIAGNOSIS — I10 HYPERTENSION, ESSENTIAL: ICD-10-CM

## 2020-06-03 PROCEDURE — G0438 PPPS, INITIAL VISIT: HCPCS | Performed by: INTERNAL MEDICINE

## 2020-06-03 PROCEDURE — 96127 BRIEF EMOTIONAL/BEHAV ASSMT: CPT | Performed by: INTERNAL MEDICINE

## 2020-06-03 PROCEDURE — G0463 HOSPITAL OUTPT CLINIC VISIT: HCPCS | Performed by: INTERNAL MEDICINE

## 2020-06-03 PROCEDURE — 90732 PPSV23 VACC 2 YRS+ SUBQ/IM: CPT | Performed by: INTERNAL MEDICINE

## 2020-06-03 PROCEDURE — 90714 TD VACC NO PRESV 7 YRS+ IM: CPT | Performed by: INTERNAL MEDICINE

## 2020-06-03 PROCEDURE — 99213 OFFICE O/P EST LOW 20 MIN: CPT | Performed by: INTERNAL MEDICINE

## 2020-06-03 PROCEDURE — 90472 IMMUNIZATION ADMIN EACH ADD: CPT | Performed by: INTERNAL MEDICINE

## 2020-06-03 PROCEDURE — G0009 ADMIN PNEUMOCOCCAL VACCINE: HCPCS | Performed by: INTERNAL MEDICINE

## 2020-06-03 RX ORDER — ESCITALOPRAM OXALATE 10 MG/1
20 TABLET ORAL
Qty: 90 TABLET | Refills: 3 | COMMUNITY
Start: 2020-06-03 | End: 2020-09-28

## 2020-06-03 NOTE — PROGRESS NOTES
Jaiden Rojo is a 75year old female who presents for     Medicare annual    HTN-home BPs 128-140/78-79. Gained a little weight during the quarantine. Went back to [employer].  Taking amlodipine 2.5 mg -pt cut to 1/2 tab daily --she cut in months ag 76         age 76 after thoracic aortic aneurysm   • Hypertension Mother    • Other (leukemia) Mother 79        myelolymphocytic leukemia   • Alcohol and Other Disorders Associated Brother         cirrhosis   • Hypertension Brother    • Other (no child 5/2/18. Check lipids.      Depression, mild--on lexapro 10 mg daily since age 44 when had hyst. Sx a worse with recent stress--incr to lexapro 20 mg daily. Pt declines counsellor.  She will call if not better and if needs new rx for 20 mg.     L knee OA--x- Oral Tab TAKE 1 TABLETS BY MOUTH DAILY 90 tablet 3   • DHA-EPA-Coenzyme Q10-Vitamin E (COQ-10 & FISH OIL OR) Take 1 tablet by mouth daily. • Glucosamine-Chondroitin (GLUCOSAMINE CHONDR COMPLEX OR) Take 1 tablet by mouth daily.      • ibuprofen (MOTRIN) Fall/Risk Assessment     Do you have 3 or more medical conditions?: 0-No    Have you fallen in the last 12 months?: 0-No    Do you accidently lose urine?: 1-Yes         Do you have any difficulty walking or getting up?: 0-No    Do you have any tripping a problem hearing over the telephone:  Sometimes I have trouble following the conversations when two or more people are talking at the same time:  Sometimes   I have trouble understanding things on the TV:  Yes I have to strain to understand conversations:

## 2020-06-05 ENCOUNTER — TELEPHONE (OUTPATIENT)
Dept: INTERNAL MEDICINE CLINIC | Facility: CLINIC | Age: 65
End: 2020-06-05

## 2020-06-05 ENCOUNTER — LAB ENCOUNTER (OUTPATIENT)
Dept: LAB | Age: 65
End: 2020-06-05
Attending: INTERNAL MEDICINE
Payer: MEDICARE

## 2020-06-05 DIAGNOSIS — I10 HYPERTENSION, ESSENTIAL: ICD-10-CM

## 2020-06-05 PROCEDURE — 36415 COLL VENOUS BLD VENIPUNCTURE: CPT

## 2020-06-05 PROCEDURE — 80053 COMPREHEN METABOLIC PANEL: CPT

## 2020-06-05 PROCEDURE — 84443 ASSAY THYROID STIM HORMONE: CPT

## 2020-06-05 PROCEDURE — 85025 COMPLETE CBC W/AUTO DIFF WBC: CPT

## 2020-06-05 PROCEDURE — 80061 LIPID PANEL: CPT

## 2020-06-05 PROCEDURE — 81001 URINALYSIS AUTO W/SCOPE: CPT | Performed by: INTERNAL MEDICINE

## 2020-06-05 NOTE — TELEPHONE ENCOUNTER
To nursing, please tell patient lab results are okay. LDL cholesterol is 123, slightly elevated from 115 in May 2019. Work on [employer] and continue exercise. Thanks. Note to self–  6/5/20–CBC CMP TSH lipid UA–okay.

## 2020-07-31 ENCOUNTER — HOSPITAL ENCOUNTER (OUTPATIENT)
Dept: MAMMOGRAPHY | Age: 65
Discharge: HOME | End: 2020-07-31
Attending: INTERNAL MEDICINE
Payer: MEDICARE

## 2020-07-31 DIAGNOSIS — Z12.31 VISIT FOR SCREENING MAMMOGRAM: ICD-10-CM

## 2020-07-31 PROCEDURE — 77063 BREAST TOMOSYNTHESIS BI: CPT | Performed by: INTERNAL MEDICINE

## 2020-07-31 PROCEDURE — 77067 SCR MAMMO BI INCL CAD: CPT | Performed by: INTERNAL MEDICINE

## 2020-09-28 RX ORDER — ESCITALOPRAM OXALATE 20 MG/1
20 TABLET ORAL DAILY
Qty: 90 TABLET | Refills: 3 | Status: SHIPPED | OUTPATIENT
Start: 2020-09-28 | End: 2021-07-28

## 2020-09-28 NOTE — TELEPHONE ENCOUNTER
To Dr. Jason Landa----    RX pended for review. Touched base with patient for update on increased dose of 20mg. \"Pavel Jones, yes I have been taking the increased dosage per Dr. Claudia Wilson suggestion.  I am feeling better and would like to continue with the increas

## 2020-09-28 NOTE — TELEPHONE ENCOUNTER
Per LOV:  \"Depression, mild--on lexapro 10 mg daily since age 44 when had hyst. Sx a worse with recent stress--incr to lexapro 20 mg daily. Pt declines counsellor.  She will call if not better and if needs new rx for 20 mg.\"    Mychart to patient for clar

## 2020-12-01 RX ORDER — AMLODIPINE BESYLATE 2.5 MG/1
TABLET ORAL
Qty: 90 TABLET | Refills: 1 | Status: SHIPPED | OUTPATIENT
Start: 2020-12-01 | End: 2021-05-23

## 2021-03-13 DIAGNOSIS — Z23 NEED FOR VACCINATION: ICD-10-CM

## 2021-05-23 RX ORDER — AMLODIPINE BESYLATE 2.5 MG/1
TABLET ORAL
Qty: 90 TABLET | Refills: 0 | Status: SHIPPED | OUTPATIENT
Start: 2021-05-23 | End: 2021-07-28 | Stop reason: DRUGHIGH

## 2021-06-16 ENCOUNTER — TELEPHONE (OUTPATIENT)
Dept: INTERNAL MEDICINE CLINIC | Facility: CLINIC | Age: 66
End: 2021-06-16

## 2021-06-16 DIAGNOSIS — I10 ESSENTIAL HYPERTENSION: Primary | ICD-10-CM

## 2021-06-16 NOTE — TELEPHONE ENCOUNTER
Pt scheduled Medicare Annual for 7/28/21  Pt will go for labs prior  Please call pt when orders in place  Tasked to nursing

## 2021-06-16 NOTE — TELEPHONE ENCOUNTER
To nursing, please tell patient lab orders are in the system. Thanks. 1. Essential hypertension  - LIPID PANEL; Future  - COMP METABOLIC PANEL (14); Future  - CBC WITH DIFFERENTIAL WITH PLATELET;  Future  - URINALYSIS WITH CULTURE REFLEX; Future  - TSH

## 2021-07-23 ENCOUNTER — LAB ENCOUNTER (OUTPATIENT)
Dept: LAB | Age: 66
End: 2021-07-23
Attending: INTERNAL MEDICINE
Payer: MEDICARE

## 2021-07-23 DIAGNOSIS — I10 ESSENTIAL HYPERTENSION: ICD-10-CM

## 2021-07-23 LAB
ALBUMIN SERPL-MCNC: 3.7 G/DL (ref 3.4–5)
ALBUMIN/GLOB SERPL: 1.2 {RATIO} (ref 1–2)
ALP LIVER SERPL-CCNC: 64 U/L
ALT SERPL-CCNC: 29 U/L
ANION GAP SERPL CALC-SCNC: 6 MMOL/L (ref 0–18)
AST SERPL-CCNC: 13 U/L (ref 15–37)
BASOPHILS # BLD AUTO: 0.03 X10(3) UL (ref 0–0.2)
BASOPHILS NFR BLD AUTO: 0.5 %
BILIRUB SERPL-MCNC: 0.5 MG/DL (ref 0.1–2)
BILIRUB UR QL: NEGATIVE
BUN BLD-MCNC: 20 MG/DL (ref 7–18)
BUN/CREAT SERPL: 25 (ref 10–20)
CALCIUM BLD-MCNC: 9.1 MG/DL (ref 8.5–10.1)
CHLORIDE SERPL-SCNC: 106 MMOL/L (ref 98–112)
CHOLEST SMN-MCNC: 236 MG/DL (ref ?–200)
CLARITY UR: CLEAR
CO2 SERPL-SCNC: 27 MMOL/L (ref 21–32)
COLOR UR: YELLOW
CREAT BLD-MCNC: 0.8 MG/DL
DEPRECATED RDW RBC AUTO: 46.5 FL (ref 35.1–46.3)
EOSINOPHIL # BLD AUTO: 0.06 X10(3) UL (ref 0–0.7)
EOSINOPHIL NFR BLD AUTO: 1 %
ERYTHROCYTE [DISTWIDTH] IN BLOOD BY AUTOMATED COUNT: 13 % (ref 11–15)
GLOBULIN PLAS-MCNC: 3.2 G/DL (ref 2.8–4.4)
GLUCOSE BLD-MCNC: 92 MG/DL (ref 70–99)
GLUCOSE UR-MCNC: NEGATIVE MG/DL
HCT VFR BLD AUTO: 44.5 %
HDLC SERPL-MCNC: 68 MG/DL (ref 40–59)
HGB BLD-MCNC: 14.5 G/DL
HGB UR QL STRIP.AUTO: NEGATIVE
IMM GRANULOCYTES # BLD AUTO: 0.01 X10(3) UL (ref 0–1)
IMM GRANULOCYTES NFR BLD: 0.2 %
KETONES UR-MCNC: NEGATIVE MG/DL
LDLC SERPL CALC-MCNC: 155 MG/DL (ref ?–100)
LEUKOCYTE ESTERASE UR QL STRIP.AUTO: NEGATIVE
LYMPHOCYTES # BLD AUTO: 1.78 X10(3) UL (ref 1–4)
LYMPHOCYTES NFR BLD AUTO: 31 %
M PROTEIN MFR SERPL ELPH: 6.9 G/DL (ref 6.4–8.2)
MCH RBC QN AUTO: 31.9 PG (ref 26–34)
MCHC RBC AUTO-ENTMCNC: 32.6 G/DL (ref 31–37)
MCV RBC AUTO: 97.8 FL
MONOCYTES # BLD AUTO: 0.53 X10(3) UL (ref 0.1–1)
MONOCYTES NFR BLD AUTO: 9.2 %
NEUTROPHILS # BLD AUTO: 3.33 X10 (3) UL (ref 1.5–7.7)
NEUTROPHILS # BLD AUTO: 3.33 X10(3) UL (ref 1.5–7.7)
NEUTROPHILS NFR BLD AUTO: 58.1 %
NITRITE UR QL STRIP.AUTO: NEGATIVE
NONHDLC SERPL-MCNC: 168 MG/DL (ref ?–130)
OSMOLALITY SERPL CALC.SUM OF ELEC: 290 MOSM/KG (ref 275–295)
PATIENT FASTING Y/N/NP: YES
PATIENT FASTING Y/N/NP: YES
PH UR: 7 [PH] (ref 5–8)
PLATELET # BLD AUTO: 260 10(3)UL (ref 150–450)
POTASSIUM SERPL-SCNC: 4.4 MMOL/L (ref 3.5–5.1)
PROT UR-MCNC: NEGATIVE MG/DL
RBC # BLD AUTO: 4.55 X10(6)UL
SODIUM SERPL-SCNC: 139 MMOL/L (ref 136–145)
SP GR UR STRIP: 1.01 (ref 1–1.03)
TRIGL SERPL-MCNC: 78 MG/DL (ref 30–149)
TSI SER-ACNC: 1.99 MIU/ML (ref 0.36–3.74)
UROBILINOGEN UR STRIP-ACNC: <2
VLDLC SERPL CALC-MCNC: 15 MG/DL (ref 0–30)
WBC # BLD AUTO: 5.7 X10(3) UL (ref 4–11)

## 2021-07-23 PROCEDURE — 81003 URINALYSIS AUTO W/O SCOPE: CPT

## 2021-07-23 PROCEDURE — 80061 LIPID PANEL: CPT

## 2021-07-23 PROCEDURE — 80053 COMPREHEN METABOLIC PANEL: CPT

## 2021-07-23 PROCEDURE — 36415 COLL VENOUS BLD VENIPUNCTURE: CPT

## 2021-07-23 PROCEDURE — 85025 COMPLETE CBC W/AUTO DIFF WBC: CPT

## 2021-07-23 PROCEDURE — 84443 ASSAY THYROID STIM HORMONE: CPT

## 2021-07-26 NOTE — PROGRESS NOTES
Jovanna Velazquez is a 77year old female who presents for     Medicare annual    Feels good. Doing [employer] and by this scale, lost 6# in one yr. Walks for exercise. HTN-home BPs not checked.  amlodipine 2.5 mg daily    Depression--tolerated increa • Alcohol and Other Disorders Associated Brother         cirrhosis   • Hypertension Brother    • Other (no children) Other    • Stroke Maternal Grandmother          age 64      Social History:   Social History    Tobacco Use      Smoking status:  Form 10/26/18–small L suprapatellar effusion  Dr. Matt Au shot 12/2018 helped. Dermatofibroma--L leg removed 2019 Dr Kamran Stallworth returned and Dr. Lisa Wright removed.      Hearing difficulty--audiologist Ayana Tucker 5/21/18-sensorineural HL & candidate for H Biweekly Place 1 patch onto the skin once a week. 3   • Multiple Vitamin Oral Tab Take 1 tablet by mouth daily. • Cholecalciferol (VITAMIN D) 1000 UNITS Oral Tab Take  by mouth.      • Calcium Carbonate-Vitamin D (OSCAL-500) 500-200 MG-UNIT Oral Tab Directives     Do you have a healthcare power of ?: No    Do you have a living will?: No     Hearing Assessment (Required for AWV/SWV)    Conversation       Visual Acuity                   Cognitive Assessment     What day of the week is this?: Cor

## 2021-07-28 ENCOUNTER — OFFICE VISIT (OUTPATIENT)
Dept: INTERNAL MEDICINE CLINIC | Facility: CLINIC | Age: 66
End: 2021-07-28
Payer: MEDICARE

## 2021-07-28 VITALS
HEART RATE: 68 BPM | HEIGHT: 66 IN | WEIGHT: 162 LBS | SYSTOLIC BLOOD PRESSURE: 142 MMHG | DIASTOLIC BLOOD PRESSURE: 90 MMHG | TEMPERATURE: 98 F | BODY MASS INDEX: 26.03 KG/M2

## 2021-07-28 DIAGNOSIS — E78.00 HYPERCHOLESTEROLEMIA: ICD-10-CM

## 2021-07-28 DIAGNOSIS — Z00.00 MEDICARE ANNUAL WELLNESS VISIT, SUBSEQUENT: Primary | ICD-10-CM

## 2021-07-28 DIAGNOSIS — Z12.31 VISIT FOR SCREENING MAMMOGRAM: ICD-10-CM

## 2021-07-28 DIAGNOSIS — F32.A MILD DEPRESSION: ICD-10-CM

## 2021-07-28 DIAGNOSIS — I10 HYPERTENSION, ESSENTIAL: ICD-10-CM

## 2021-07-28 PROCEDURE — G0439 PPPS, SUBSEQ VISIT: HCPCS | Performed by: INTERNAL MEDICINE

## 2021-07-28 PROCEDURE — 99213 OFFICE O/P EST LOW 20 MIN: CPT | Performed by: INTERNAL MEDICINE

## 2021-07-28 RX ORDER — ESCITALOPRAM OXALATE 20 MG/1
20 TABLET ORAL DAILY
Qty: 90 TABLET | Refills: 3 | Status: SHIPPED | OUTPATIENT
Start: 2021-07-28

## 2021-07-28 RX ORDER — AMLODIPINE BESYLATE 5 MG/1
5 TABLET ORAL DAILY
Qty: 90 TABLET | Refills: 3 | Status: SHIPPED | OUTPATIENT
Start: 2021-07-28 | End: 2022-07-23

## 2021-07-28 NOTE — PATIENT INSTRUCTIONS
Mammogram due 8/1/21 or later. Increase dose of amlodipine 5 mg daily. Send in home BPs in 2-3 wks. Shingles vaccine is available at pharmacy.

## 2021-08-19 RX ORDER — AMLODIPINE BESYLATE 2.5 MG/1
TABLET ORAL
Qty: 90 TABLET | Refills: 0 | OUTPATIENT
Start: 2021-08-19

## 2021-08-19 NOTE — TELEPHONE ENCOUNTER
Requested Prescriptions     Refused Prescriptions Disp Refills   • AMLODIPINE BESYLATE 2.5 MG Oral Tab [Pharmacy Med Name: AMLODIPINE BESYLATE 2.5MG TABLETS] 90 tablet 0     Sig: TAKE 1 TABLET BY MOUTH DAILY     Refused By: Aaron Choi

## 2021-08-22 ENCOUNTER — HOSPITAL ENCOUNTER (OUTPATIENT)
Dept: MAMMOGRAPHY | Facility: HOSPITAL | Age: 66
Discharge: HOME | End: 2021-08-22
Attending: INTERNAL MEDICINE
Payer: MEDICARE

## 2021-08-22 DIAGNOSIS — Z12.31 VISIT FOR SCREENING MAMMOGRAM: ICD-10-CM

## 2021-08-22 PROCEDURE — 77067 SCR MAMMO BI INCL CAD: CPT | Performed by: INTERNAL MEDICINE

## 2021-08-22 PROCEDURE — 77063 BREAST TOMOSYNTHESIS BI: CPT | Performed by: INTERNAL MEDICINE

## 2022-06-23 RX ORDER — AMLODIPINE BESYLATE 5 MG/1
TABLET ORAL
Qty: 90 TABLET | Refills: 0 | Status: SHIPPED | OUTPATIENT
Start: 2022-06-23

## 2022-06-23 RX ORDER — ESCITALOPRAM OXALATE 20 MG/1
TABLET ORAL
Qty: 90 TABLET | Refills: 0 | Status: SHIPPED | OUTPATIENT
Start: 2022-06-23

## 2022-07-27 ENCOUNTER — TELEPHONE (OUTPATIENT)
Dept: INTERNAL MEDICINE CLINIC | Facility: CLINIC | Age: 67
End: 2022-07-27

## 2022-07-27 DIAGNOSIS — I10 ESSENTIAL HYPERTENSION: Primary | ICD-10-CM

## 2022-07-27 DIAGNOSIS — E78.00 HYPERCHOLESTEREMIA: ICD-10-CM

## 2022-07-27 NOTE — TELEPHONE ENCOUNTER
To nursing, please tell pt order for fasting blood tests are in the system. Thanks. lab before 8/8/22 visit-cbc cmp tsh lipid  1. Essential hypertension  - CBC WITH DIFFERENTIAL WITH PLATELET; Future  - COMP METABOLIC PANEL (14); Future  - LIPID PANEL; Future  - TSH W REFLEX TO FREE T4; Future    2. Hypercholesteremia  - CBC WITH DIFFERENTIAL WITH PLATELET; Future  - COMP METABOLIC PANEL (14); Future  - LIPID PANEL;  Future  - TSH W REFLEX TO FREE T4; Future

## 2022-08-04 ENCOUNTER — LAB ENCOUNTER (OUTPATIENT)
Dept: LAB | Age: 67
End: 2022-08-04
Attending: INTERNAL MEDICINE
Payer: MEDICARE

## 2022-08-04 DIAGNOSIS — E78.00 HYPERCHOLESTEREMIA: ICD-10-CM

## 2022-08-04 DIAGNOSIS — I10 ESSENTIAL HYPERTENSION: ICD-10-CM

## 2022-08-04 LAB
ALBUMIN SERPL-MCNC: 3.9 G/DL (ref 3.4–5)
ALBUMIN/GLOB SERPL: 1.1 {RATIO} (ref 1–2)
ALP LIVER SERPL-CCNC: 67 U/L
ALT SERPL-CCNC: 39 U/L
ANION GAP SERPL CALC-SCNC: 4 MMOL/L (ref 0–18)
AST SERPL-CCNC: 18 U/L (ref 15–37)
BASOPHILS # BLD AUTO: 0.05 X10(3) UL (ref 0–0.2)
BASOPHILS NFR BLD AUTO: 0.8 %
BILIRUB SERPL-MCNC: 0.5 MG/DL (ref 0.1–2)
BUN BLD-MCNC: 23 MG/DL (ref 7–18)
BUN/CREAT SERPL: 25.6 (ref 10–20)
CALCIUM BLD-MCNC: 8.9 MG/DL (ref 8.5–10.1)
CHLORIDE SERPL-SCNC: 108 MMOL/L (ref 98–112)
CHOLEST SERPL-MCNC: 239 MG/DL (ref ?–200)
CO2 SERPL-SCNC: 28 MMOL/L (ref 21–32)
CREAT BLD-MCNC: 0.9 MG/DL
DEPRECATED RDW RBC AUTO: 48.2 FL (ref 35.1–46.3)
EOSINOPHIL # BLD AUTO: 0.08 X10(3) UL (ref 0–0.7)
EOSINOPHIL NFR BLD AUTO: 1.3 %
ERYTHROCYTE [DISTWIDTH] IN BLOOD BY AUTOMATED COUNT: 13.3 % (ref 11–15)
FASTING PATIENT LIPID ANSWER: YES
FASTING STATUS PATIENT QL REPORTED: YES
GFR SERPLBLD BASED ON 1.73 SQ M-ARVRAT: 70 ML/MIN/1.73M2 (ref 60–?)
GLOBULIN PLAS-MCNC: 3.5 G/DL (ref 2.8–4.4)
GLUCOSE BLD-MCNC: 90 MG/DL (ref 70–99)
HCT VFR BLD AUTO: 44.6 %
HDLC SERPL-MCNC: 71 MG/DL (ref 40–59)
HGB BLD-MCNC: 14.2 G/DL
IMM GRANULOCYTES # BLD AUTO: 0.01 X10(3) UL (ref 0–1)
IMM GRANULOCYTES NFR BLD: 0.2 %
LDLC SERPL CALC-MCNC: 150 MG/DL (ref ?–100)
LYMPHOCYTES # BLD AUTO: 1.92 X10(3) UL (ref 1–4)
LYMPHOCYTES NFR BLD AUTO: 32.1 %
MCH RBC QN AUTO: 31.5 PG (ref 26–34)
MCHC RBC AUTO-ENTMCNC: 31.8 G/DL (ref 31–37)
MCV RBC AUTO: 98.9 FL
MONOCYTES # BLD AUTO: 0.51 X10(3) UL (ref 0.1–1)
MONOCYTES NFR BLD AUTO: 8.5 %
NEUTROPHILS # BLD AUTO: 3.41 X10 (3) UL (ref 1.5–7.7)
NEUTROPHILS # BLD AUTO: 3.41 X10(3) UL (ref 1.5–7.7)
NEUTROPHILS NFR BLD AUTO: 57.1 %
NONHDLC SERPL-MCNC: 168 MG/DL (ref ?–130)
OSMOLALITY SERPL CALC.SUM OF ELEC: 293 MOSM/KG (ref 275–295)
PLATELET # BLD AUTO: 290 10(3)UL (ref 150–450)
POTASSIUM SERPL-SCNC: 4.3 MMOL/L (ref 3.5–5.1)
PROT SERPL-MCNC: 7.4 G/DL (ref 6.4–8.2)
RBC # BLD AUTO: 4.51 X10(6)UL
SODIUM SERPL-SCNC: 140 MMOL/L (ref 136–145)
TRIGL SERPL-MCNC: 101 MG/DL (ref 30–149)
TSI SER-ACNC: 2.94 MIU/ML (ref 0.36–3.74)
VLDLC SERPL CALC-MCNC: 19 MG/DL (ref 0–30)
WBC # BLD AUTO: 6 X10(3) UL (ref 4–11)

## 2022-08-04 PROCEDURE — 84443 ASSAY THYROID STIM HORMONE: CPT

## 2022-08-04 PROCEDURE — 80061 LIPID PANEL: CPT

## 2022-08-04 PROCEDURE — 85025 COMPLETE CBC W/AUTO DIFF WBC: CPT

## 2022-08-04 PROCEDURE — 80053 COMPREHEN METABOLIC PANEL: CPT

## 2022-08-04 PROCEDURE — 36415 COLL VENOUS BLD VENIPUNCTURE: CPT

## 2022-08-08 ENCOUNTER — OFFICE VISIT (OUTPATIENT)
Dept: INTERNAL MEDICINE CLINIC | Facility: CLINIC | Age: 67
End: 2022-08-08
Payer: MEDICARE

## 2022-08-08 VITALS
DIASTOLIC BLOOD PRESSURE: 76 MMHG | OXYGEN SATURATION: 95 % | WEIGHT: 177.19 LBS | BODY MASS INDEX: 28.48 KG/M2 | HEART RATE: 78 BPM | SYSTOLIC BLOOD PRESSURE: 136 MMHG | TEMPERATURE: 98 F | HEIGHT: 66 IN

## 2022-08-08 DIAGNOSIS — Z00.00 MEDICARE ANNUAL WELLNESS VISIT, SUBSEQUENT: Primary | ICD-10-CM

## 2022-08-08 DIAGNOSIS — I10 HYPERTENSION, ESSENTIAL: ICD-10-CM

## 2022-08-08 DIAGNOSIS — F32.A MILD DEPRESSION: ICD-10-CM

## 2022-08-08 DIAGNOSIS — E78.00 HYPERCHOLESTEROLEMIA: ICD-10-CM

## 2022-08-08 DIAGNOSIS — Z12.31 VISIT FOR SCREENING MAMMOGRAM: ICD-10-CM

## 2022-08-08 RX ORDER — ESCITALOPRAM OXALATE 20 MG/1
20 TABLET ORAL DAILY
Qty: 90 TABLET | Refills: 3 | Status: SHIPPED | OUTPATIENT
Start: 2022-08-08

## 2022-08-08 RX ORDER — AMLODIPINE BESYLATE 5 MG/1
5 TABLET ORAL DAILY
Qty: 90 TABLET | Refills: 3 | Status: SHIPPED | OUTPATIENT
Start: 2022-08-08

## 2022-08-08 RX ORDER — LATANOPROST 50 UG/ML
1 SOLUTION/ DROPS OPHTHALMIC NIGHTLY
COMMUNITY
Start: 2022-07-09

## 2022-09-08 ENCOUNTER — HOSPITAL ENCOUNTER (OUTPATIENT)
Dept: MAMMOGRAPHY | Facility: HOSPITAL | Age: 67
Discharge: HOME | End: 2022-09-08
Attending: INTERNAL MEDICINE
Payer: MEDICARE

## 2022-09-08 DIAGNOSIS — Z12.31 VISIT FOR SCREENING MAMMOGRAM: ICD-10-CM

## 2022-09-08 PROCEDURE — 77063 BREAST TOMOSYNTHESIS BI: CPT | Performed by: INTERNAL MEDICINE

## 2022-09-08 PROCEDURE — 77067 SCR MAMMO BI INCL CAD: CPT | Performed by: INTERNAL MEDICINE

## 2022-12-08 RX ORDER — AMLODIPINE BESYLATE 5 MG/1
TABLET ORAL
Qty: 90 TABLET | Refills: 3 | OUTPATIENT
Start: 2022-12-08

## 2023-05-22 ENCOUNTER — TELEPHONE (OUTPATIENT)
Dept: INTERNAL MEDICINE CLINIC | Facility: CLINIC | Age: 68
End: 2023-05-22

## 2023-05-22 NOTE — TELEPHONE ENCOUNTER
Please advise - called patient who states she has sharp stabbing pain on right side where rib cage is . Last Thursday it was so bad that she had to go to bed  And she got really nauseated. Since then the pain comes and goes , very often , denies any other SX , denies fever . She states thepain \"takes my breath away\" .  Also when rolling over at night the pain will start - to      Has nyla for tomorrow

## 2023-05-22 NOTE — TELEPHONE ENCOUNTER
If it has remained stable for this past week, OK to wait for evaluation tomorrow.  Please provide ER parameters:    10/10 Chest pain, worsening pain from what she's experiencing now., fevers, WOrsening SOB, or if she has that severe pain that made her go back to bed, should go to the ER

## 2023-05-22 NOTE — TELEPHONE ENCOUNTER
Pt. States she has been having intermittent pain in her right upper abdomen for about a week. She states that when the pain comes, it is a sharp stabbing pain. It lasts for a little bit and then it dissipates. She reports an 8 on the pain scale. She scheduled an appt. Tomorrow with Dr. Carlos Manuel Welsh. OK to wait?

## 2023-05-23 ENCOUNTER — OFFICE VISIT (OUTPATIENT)
Dept: INTERNAL MEDICINE CLINIC | Facility: CLINIC | Age: 68
End: 2023-05-23

## 2023-05-23 VITALS
WEIGHT: 177.19 LBS | BODY MASS INDEX: 28.48 KG/M2 | SYSTOLIC BLOOD PRESSURE: 140 MMHG | DIASTOLIC BLOOD PRESSURE: 82 MMHG | HEIGHT: 66 IN | HEART RATE: 76 BPM | TEMPERATURE: 98 F | OXYGEN SATURATION: 96 %

## 2023-05-23 DIAGNOSIS — E78.00 HYPERCHOLESTEROLEMIA: ICD-10-CM

## 2023-05-23 DIAGNOSIS — R07.9 CHEST PAIN, UNSPECIFIED TYPE: ICD-10-CM

## 2023-05-23 DIAGNOSIS — I10 HYPERTENSION, ESSENTIAL: ICD-10-CM

## 2023-05-23 DIAGNOSIS — R10.11 RIGHT UPPER QUADRANT ABDOMINAL PAIN: Primary | ICD-10-CM

## 2023-05-23 DIAGNOSIS — F32.A MILD DEPRESSION: ICD-10-CM

## 2023-05-23 LAB
ATRIAL RATE: 69 BPM
P AXIS: -5 DEGREES
P-R INTERVAL: 154 MS
Q-T INTERVAL: 414 MS
QRS DURATION: 98 MS
QTC CALCULATION (BEZET): 443 MS
R AXIS: 16 DEGREES
T AXIS: 21 DEGREES
VENTRICULAR RATE: 69 BPM

## 2023-05-23 PROCEDURE — 93000 ELECTROCARDIOGRAM COMPLETE: CPT | Performed by: INTERNAL MEDICINE

## 2023-05-23 PROCEDURE — 1125F AMNT PAIN NOTED PAIN PRSNT: CPT | Performed by: INTERNAL MEDICINE

## 2023-05-23 PROCEDURE — 99214 OFFICE O/P EST MOD 30 MIN: CPT | Performed by: INTERNAL MEDICINE

## 2023-05-23 RX ORDER — AMLODIPINE BESYLATE 5 MG/1
5 TABLET ORAL DAILY
Qty: 90 TABLET | Refills: 3 | Status: SHIPPED | OUTPATIENT
Start: 2023-05-23

## 2023-05-23 RX ORDER — ESCITALOPRAM OXALATE 20 MG/1
20 TABLET ORAL DAILY
Qty: 90 TABLET | Refills: 3 | Status: SHIPPED | OUTPATIENT
Start: 2023-05-23

## 2023-05-23 RX ORDER — ESTRADIOL 0.05 MG/D
1 PATCH, EXTENDED RELEASE TRANSDERMAL WEEKLY
Qty: 12 PATCH | Refills: 1 | Status: SHIPPED | OUTPATIENT
Start: 2023-05-23 | End: 2023-05-25

## 2023-05-23 RX ORDER — OMEPRAZOLE 40 MG/1
40 CAPSULE, DELAYED RELEASE ORAL 2 TIMES DAILY
Qty: 60 CAPSULE | Refills: 0 | Status: SHIPPED | OUTPATIENT
Start: 2023-05-23 | End: 2024-05-17

## 2023-05-24 ENCOUNTER — TELEPHONE (OUTPATIENT)
Dept: INTERNAL MEDICINE CLINIC | Facility: CLINIC | Age: 68
End: 2023-05-24

## 2023-05-24 ENCOUNTER — LAB ENCOUNTER (OUTPATIENT)
Dept: LAB | Age: 68
End: 2023-05-24
Attending: INTERNAL MEDICINE
Payer: MEDICARE

## 2023-05-24 DIAGNOSIS — R10.11 RIGHT UPPER QUADRANT ABDOMINAL PAIN: ICD-10-CM

## 2023-05-24 LAB
ALBUMIN SERPL-MCNC: 3.8 G/DL (ref 3.4–5)
ALBUMIN/GLOB SERPL: 1.2 {RATIO} (ref 1–2)
ALP LIVER SERPL-CCNC: 60 U/L
ALT SERPL-CCNC: 33 U/L
ANION GAP SERPL CALC-SCNC: 5 MMOL/L (ref 0–18)
AST SERPL-CCNC: 18 U/L (ref 15–37)
BASOPHILS # BLD AUTO: 0.03 X10(3) UL (ref 0–0.2)
BASOPHILS NFR BLD AUTO: 0.5 %
BILIRUB SERPL-MCNC: 0.5 MG/DL (ref 0.1–2)
BILIRUB UR QL: NEGATIVE
BUN BLD-MCNC: 19 MG/DL (ref 7–18)
BUN/CREAT SERPL: 23.2 (ref 10–20)
CALCIUM BLD-MCNC: 9.2 MG/DL (ref 8.5–10.1)
CHLORIDE SERPL-SCNC: 111 MMOL/L (ref 98–112)
CO2 SERPL-SCNC: 25 MMOL/L (ref 21–32)
COLOR UR: YELLOW
CREAT BLD-MCNC: 0.82 MG/DL
CRP SERPL-MCNC: <0.29 MG/DL (ref ?–0.3)
DEPRECATED RDW RBC AUTO: 45.1 FL (ref 35.1–46.3)
EOSINOPHIL # BLD AUTO: 0.03 X10(3) UL (ref 0–0.7)
EOSINOPHIL NFR BLD AUTO: 0.5 %
ERYTHROCYTE [DISTWIDTH] IN BLOOD BY AUTOMATED COUNT: 12.9 % (ref 11–15)
FASTING STATUS PATIENT QL REPORTED: NO
GFR SERPLBLD BASED ON 1.73 SQ M-ARVRAT: 77 ML/MIN/1.73M2 (ref 60–?)
GLOBULIN PLAS-MCNC: 3.1 G/DL (ref 2.8–4.4)
GLUCOSE BLD-MCNC: 110 MG/DL (ref 70–99)
GLUCOSE UR-MCNC: NORMAL MG/DL
HCT VFR BLD AUTO: 42.2 %
HGB BLD-MCNC: 13.9 G/DL
HGB UR QL STRIP.AUTO: NEGATIVE
IMM GRANULOCYTES # BLD AUTO: 0.01 X10(3) UL (ref 0–1)
IMM GRANULOCYTES NFR BLD: 0.2 %
LEUKOCYTE ESTERASE UR QL STRIP.AUTO: 25
LIPASE SERPL-CCNC: 56 U/L (ref 13–75)
LYMPHOCYTES # BLD AUTO: 1.72 X10(3) UL (ref 1–4)
LYMPHOCYTES NFR BLD AUTO: 29.1 %
MCH RBC QN AUTO: 31.4 PG (ref 26–34)
MCHC RBC AUTO-ENTMCNC: 32.9 G/DL (ref 31–37)
MCV RBC AUTO: 95.5 FL
MONOCYTES # BLD AUTO: 0.56 X10(3) UL (ref 0.1–1)
MONOCYTES NFR BLD AUTO: 9.5 %
NEUTROPHILS # BLD AUTO: 3.56 X10 (3) UL (ref 1.5–7.7)
NEUTROPHILS # BLD AUTO: 3.56 X10(3) UL (ref 1.5–7.7)
NEUTROPHILS NFR BLD AUTO: 60.2 %
NITRITE UR QL STRIP.AUTO: NEGATIVE
OSMOLALITY SERPL CALC.SUM OF ELEC: 295 MOSM/KG (ref 275–295)
PH UR: 5.5 [PH] (ref 5–8)
PLATELET # BLD AUTO: 251 10(3)UL (ref 150–450)
POTASSIUM SERPL-SCNC: 3.9 MMOL/L (ref 3.5–5.1)
PROT SERPL-MCNC: 6.9 G/DL (ref 6.4–8.2)
PROT UR-MCNC: 20 MG/DL
RBC # BLD AUTO: 4.42 X10(6)UL
SODIUM SERPL-SCNC: 141 MMOL/L (ref 136–145)
SP GR UR STRIP: >1.03 (ref 1–1.03)
UROBILINOGEN UR STRIP-ACNC: NORMAL
WBC # BLD AUTO: 5.9 X10(3) UL (ref 4–11)

## 2023-05-24 PROCEDURE — 83690 ASSAY OF LIPASE: CPT

## 2023-05-24 PROCEDURE — 81001 URINALYSIS AUTO W/SCOPE: CPT

## 2023-05-24 PROCEDURE — 36415 COLL VENOUS BLD VENIPUNCTURE: CPT

## 2023-05-24 PROCEDURE — 80053 COMPREHEN METABOLIC PANEL: CPT

## 2023-05-24 PROCEDURE — 87086 URINE CULTURE/COLONY COUNT: CPT

## 2023-05-24 PROCEDURE — 86140 C-REACTIVE PROTEIN: CPT

## 2023-05-24 PROCEDURE — 85025 COMPLETE CBC W/AUTO DIFF WBC: CPT

## 2023-05-24 RX ORDER — OMEPRAZOLE 40 MG/1
CAPSULE, DELAYED RELEASE ORAL
Qty: 180 CAPSULE | Refills: 0 | OUTPATIENT
Start: 2023-05-24

## 2023-05-24 RX ORDER — ESTRADIOL 0.05 MG/D
1 PATCH, EXTENDED RELEASE TRANSDERMAL WEEKLY
Qty: 12 PATCH | Refills: 1 | Status: CANCELLED | OUTPATIENT
Start: 2023-05-24 | End: 2023-08-22

## 2023-05-24 NOTE — TELEPHONE ENCOUNTER
Natalie requesting clarification the Sig with Dr Carla Israel, pt usually uses this rx 1 patch 2 times a week, this rx is 1 patch once a week  Wants to verify with Md that there's a change in direction    Placed in green folder

## 2023-05-25 NOTE — TELEPHONE ENCOUNTER
To  - repended pt prior dose for estradiol patches on dose pt was on prior (per DR. Arsenio Rivera)  Also, pt (when I spoke to her) has ultrasound scheduled for 6/8 and wants to know if it is ok to wait until then

## 2023-05-25 NOTE — TELEPHONE ENCOUNTER
To [facility] for assistance, I reviewed LOV note and I don't this changed, however [place]  notes 0.025mg from  Dr. Joycelyn Genao - I assume Dr. Tonya Erickson just made the change, correct?

## 2023-05-27 RX ORDER — ESTRADIOL 0.03 MG/D
1 FILM, EXTENDED RELEASE TRANSDERMAL
Qty: 8 EACH | Refills: 11 | Status: SHIPPED | OUTPATIENT
Start: 2023-05-29

## 2023-06-08 ENCOUNTER — HOSPITAL ENCOUNTER (OUTPATIENT)
Dept: ULTRASOUND IMAGING | Age: 68
Discharge: HOME | End: 2023-06-08
Attending: INTERNAL MEDICINE
Payer: MEDICARE

## 2023-06-08 DIAGNOSIS — R10.11 RIGHT UPPER QUADRANT ABDOMINAL PAIN: ICD-10-CM

## 2023-06-08 PROCEDURE — 76705 ECHO EXAM OF ABDOMEN: CPT | Performed by: INTERNAL MEDICINE

## 2023-06-09 NOTE — TELEPHONE ENCOUNTER
Reviewed lab results. Low suspicion for kidney stones. Extensive cholelithiasis. However patient has not had any further episodes. She also reports omeprazole has improved. We will do close observation. Any recurrence of symptoms she should proceed straight to the emergency department. Should consider cholecystectomy in the future if recurrent. She has had complete resolution of symptoms and seems to have also improved with omeprazole after 2 days of taking the medication. We will do a trial off of this medication and see if there is recurrence of symptoms suggestive more so of GERD or gastritis.

## 2023-06-22 ENCOUNTER — TELEPHONE (OUTPATIENT)
Dept: INTERNAL MEDICINE CLINIC | Facility: CLINIC | Age: 68
End: 2023-06-22

## 2023-06-23 RX ORDER — OMEPRAZOLE 40 MG/1
CAPSULE, DELAYED RELEASE ORAL
Qty: 180 CAPSULE | Refills: 0 | Status: SHIPPED | OUTPATIENT
Start: 2023-06-23

## 2023-06-23 RX ORDER — OMEPRAZOLE 40 MG/1
CAPSULE, DELAYED RELEASE ORAL
Qty: 90 CAPSULE | Refills: 0 | Status: SHIPPED | OUTPATIENT
Start: 2023-06-23 | End: 2023-06-23

## 2023-06-23 NOTE — TELEPHONE ENCOUNTER
New from ov in May. Demetrishart to pt for update     \"She has had complete resolution of symptoms and seems to have also improved with omeprazole after 2 days of taking the medication. We will do a trial off of this medication and see if there is recurrence of symptoms suggestive more so of GERD or gastritis. \"

## 2023-07-24 ENCOUNTER — TELEPHONE (OUTPATIENT)
Dept: INTERNAL MEDICINE CLINIC | Facility: CLINIC | Age: 68
End: 2023-07-24

## 2023-07-24 DIAGNOSIS — E55.9 VITAMIN D DEFICIENCY: ICD-10-CM

## 2023-07-24 DIAGNOSIS — I10 ESSENTIAL HYPERTENSION: Primary | ICD-10-CM

## 2023-07-24 DIAGNOSIS — E78.00 HYPERCHOLESTEREMIA: ICD-10-CM

## 2023-07-24 NOTE — TELEPHONE ENCOUNTER
Patient calling to request blood work prior to:    [x] physical    [] check-up      [] other    Patient uses:  [x] [facility] labs [] [address] location:       Fax #:    Patient prefers to be notified once labs are placed by: [] phone call  [x] my chart message     Tasked to nursing

## 2023-07-25 NOTE — TELEPHONE ENCOUNTER
Thank you for pending labs, I have signed them and sent a [employer] message as requested

## 2023-07-27 ENCOUNTER — LAB ENCOUNTER (OUTPATIENT)
Dept: LAB | Age: 68
End: 2023-07-27
Attending: INTERNAL MEDICINE
Payer: MEDICARE

## 2023-07-27 DIAGNOSIS — E55.9 VITAMIN D DEFICIENCY: ICD-10-CM

## 2023-07-27 DIAGNOSIS — I10 ESSENTIAL HYPERTENSION: ICD-10-CM

## 2023-07-27 DIAGNOSIS — E78.00 HYPERCHOLESTEREMIA: ICD-10-CM

## 2023-07-27 LAB
ALBUMIN SERPL-MCNC: 3.9 G/DL (ref 3.4–5)
ALBUMIN/GLOB SERPL: 1.2 {RATIO} (ref 1–2)
ALP LIVER SERPL-CCNC: 65 U/L
ALT SERPL-CCNC: 33 U/L
ANION GAP SERPL CALC-SCNC: 6 MMOL/L (ref 0–18)
AST SERPL-CCNC: 16 U/L (ref 15–37)
BASOPHILS # BLD AUTO: 0.04 X10(3) UL (ref 0–0.2)
BASOPHILS NFR BLD AUTO: 0.7 %
BILIRUB SERPL-MCNC: 0.6 MG/DL (ref 0.1–2)
BUN BLD-MCNC: 15 MG/DL (ref 7–18)
BUN/CREAT SERPL: 16.1 (ref 10–20)
CALCIUM BLD-MCNC: 9.1 MG/DL (ref 8.5–10.1)
CHLORIDE SERPL-SCNC: 108 MMOL/L (ref 98–112)
CHOLEST SERPL-MCNC: 239 MG/DL (ref ?–200)
CO2 SERPL-SCNC: 26 MMOL/L (ref 21–32)
CREAT BLD-MCNC: 0.93 MG/DL
DEPRECATED RDW RBC AUTO: 46.1 FL (ref 35.1–46.3)
EGFRCR SERPLBLD CKD-EPI 2021: 67 ML/MIN/1.73M2 (ref 60–?)
EOSINOPHIL # BLD AUTO: 0.07 X10(3) UL (ref 0–0.7)
EOSINOPHIL NFR BLD AUTO: 1.2 %
ERYTHROCYTE [DISTWIDTH] IN BLOOD BY AUTOMATED COUNT: 13 % (ref 11–15)
FASTING PATIENT LIPID ANSWER: YES
FASTING STATUS PATIENT QL REPORTED: YES
GLOBULIN PLAS-MCNC: 3.3 G/DL (ref 2.8–4.4)
GLUCOSE BLD-MCNC: 99 MG/DL (ref 70–99)
HCT VFR BLD AUTO: 44.3 %
HDLC SERPL-MCNC: 73 MG/DL (ref 40–59)
HGB BLD-MCNC: 14.5 G/DL
IMM GRANULOCYTES # BLD AUTO: 0.01 X10(3) UL (ref 0–1)
IMM GRANULOCYTES NFR BLD: 0.2 %
LDLC SERPL CALC-MCNC: 136 MG/DL (ref ?–100)
LYMPHOCYTES # BLD AUTO: 1.83 X10(3) UL (ref 1–4)
LYMPHOCYTES NFR BLD AUTO: 30.4 %
MCH RBC QN AUTO: 31.3 PG (ref 26–34)
MCHC RBC AUTO-ENTMCNC: 32.7 G/DL (ref 31–37)
MCV RBC AUTO: 95.7 FL
MONOCYTES # BLD AUTO: 0.54 X10(3) UL (ref 0.1–1)
MONOCYTES NFR BLD AUTO: 9 %
NEUTROPHILS # BLD AUTO: 3.52 X10 (3) UL (ref 1.5–7.7)
NEUTROPHILS # BLD AUTO: 3.52 X10(3) UL (ref 1.5–7.7)
NEUTROPHILS NFR BLD AUTO: 58.5 %
NONHDLC SERPL-MCNC: 166 MG/DL (ref ?–130)
OSMOLALITY SERPL CALC.SUM OF ELEC: 291 MOSM/KG (ref 275–295)
PLATELET # BLD AUTO: 249 10(3)UL (ref 150–450)
POTASSIUM SERPL-SCNC: 4 MMOL/L (ref 3.5–5.1)
PROT SERPL-MCNC: 7.2 G/DL (ref 6.4–8.2)
RBC # BLD AUTO: 4.63 X10(6)UL
SODIUM SERPL-SCNC: 140 MMOL/L (ref 136–145)
TRIGL SERPL-MCNC: 169 MG/DL (ref 30–149)
TSI SER-ACNC: 2.5 MIU/ML (ref 0.36–3.74)
VIT D+METAB SERPL-MCNC: 37.9 NG/ML (ref 30–100)
VLDLC SERPL CALC-MCNC: 31 MG/DL (ref 0–30)
WBC # BLD AUTO: 6 X10(3) UL (ref 4–11)

## 2023-07-27 PROCEDURE — 84443 ASSAY THYROID STIM HORMONE: CPT

## 2023-07-27 PROCEDURE — 80061 LIPID PANEL: CPT

## 2023-07-27 PROCEDURE — 36415 COLL VENOUS BLD VENIPUNCTURE: CPT

## 2023-07-27 PROCEDURE — 80053 COMPREHEN METABOLIC PANEL: CPT

## 2023-07-27 PROCEDURE — 82306 VITAMIN D 25 HYDROXY: CPT

## 2023-07-27 PROCEDURE — 85025 COMPLETE CBC W/AUTO DIFF WBC: CPT

## 2023-08-01 ENCOUNTER — OFFICE VISIT (OUTPATIENT)
Dept: INTERNAL MEDICINE CLINIC | Facility: CLINIC | Age: 68
End: 2023-08-01

## 2023-08-01 VITALS
HEART RATE: 71 BPM | BODY MASS INDEX: 29.63 KG/M2 | HEIGHT: 65.5 IN | DIASTOLIC BLOOD PRESSURE: 76 MMHG | RESPIRATION RATE: 16 BRPM | WEIGHT: 180 LBS | OXYGEN SATURATION: 97 % | TEMPERATURE: 98 F | SYSTOLIC BLOOD PRESSURE: 126 MMHG

## 2023-08-01 DIAGNOSIS — R92.2 DENSE BREAST: ICD-10-CM

## 2023-08-01 DIAGNOSIS — Z12.11 SCREENING FOR COLON CANCER: ICD-10-CM

## 2023-08-01 DIAGNOSIS — Z00.00 ENCOUNTER FOR ANNUAL HEALTH EXAMINATION: ICD-10-CM

## 2023-08-01 DIAGNOSIS — F32.A MILD DEPRESSION: ICD-10-CM

## 2023-08-01 DIAGNOSIS — I10 ESSENTIAL HYPERTENSION: ICD-10-CM

## 2023-08-01 DIAGNOSIS — D12.6 ADENOMATOUS POLYP OF COLON, UNSPECIFIED PART OF COLON: ICD-10-CM

## 2023-08-01 DIAGNOSIS — Z00.00 ANNUAL PHYSICAL EXAM: Primary | ICD-10-CM

## 2023-08-01 DIAGNOSIS — E78.00 HYPERCHOLESTEREMIA: ICD-10-CM

## 2023-08-01 DIAGNOSIS — Z12.31 ENCOUNTER FOR SCREENING MAMMOGRAM FOR MALIGNANT NEOPLASM OF BREAST: ICD-10-CM

## 2023-08-01 DIAGNOSIS — R10.11 RIGHT UPPER QUADRANT ABDOMINAL PAIN: ICD-10-CM

## 2023-08-01 DIAGNOSIS — Z78.0 POSTMENOPAUSAL: ICD-10-CM

## 2023-08-01 PROCEDURE — 1126F AMNT PAIN NOTED NONE PRSNT: CPT | Performed by: INTERNAL MEDICINE

## 2023-08-01 PROCEDURE — G0439 PPPS, SUBSEQ VISIT: HCPCS | Performed by: INTERNAL MEDICINE

## 2023-08-01 NOTE — PATIENT INSTRUCTIONS
- You were seen in clinic for regular annual check-up. We reviewed your recent blood test, ultrasound. Thankfully, everything looks good, symptoms likely related to acid reflux/gastritis which have resolved  - Lets continue with omeprazole 40 mg once a day only on an as needed basis      Concern for weight gain without other associated symptoms concerning for secondary causes. We discussed the importance of net caloric reduction by:  -Optimizing nutrition. Would benefit to focus on high-fiber intake with fruits, vegetables. Opt for leaner meats such as chicken/fish/turkey/pork, baked rather than fried/use of high oil content. Low-fat dairy can be incorporated. - The goal for nutrition is to gradually decrease calories per day. We discussed other methods to achieve this such as decreasing excessive snacking, decreasing portions, decrease the frequency of eating out. - Weight gain is best obtained by modifying food intake. However, exercise can help achieve caloric reduction by burning off calories. Recommend at least 150 minutes of moderate intensity exercise for weight maintenance. Higher intensity exercise for longer periods of time can also promote weight loss   -These lifestyle changes should be viewed as long-term even with weight loss. This promotes overall general health and decrease risk for chronic diseases in the future.  -We will attempt a trial with conservative measures within 3 months. If no improvement, we can consider medications or possibly referral to a specialist in the future      -You are due for colonoscopy, lets have you see gastroenterology - Dr. Juan Webster  -We did order a mammogram for you to have completed later this year - 1-2 months  - She would benefit from a DEXA scan (can be done anytime, even into next year)  -Please continue checking your blood pressures at home and notify us if they are increasing   -We did discuss managing cholesterol with dietary modifications. Guidelines do recommend that we consider a low-dose cholesterol medication, notify us if this is something you are interested in in the future  - Due for COVID-vaccine #6  - Please continue to eat a varied diet including recommended servings of vegetables, fruits, and low fat dairy. Minimize high saturated fats (such as fast foods) and high sugar intake (such as soda)  - We recommend 150 minutes of moderate intensity exercise (brisk walking, swimming) weekly to maintain your current weight. Targeted weight loss will require more vigorous exercise or more than 150 minutes/week. Return to clinic in 12 months for follow-up      [address]   Tests on this list are recommended by your physician but may not be covered, or covered at this frequency, by your insurer. Please check with your insurance carrier before scheduling to verify coverage.    PREVENTATIVE SERVICES FREQUENCY &  COVERAGE DETAILS LAST COMPLETION DATE   Diabetes Screening    Fasting Blood Sugar /  Glucose    One screening every 12 months if never tested or if previously tested but not diagnosed with pre-diabetes   One screening every 6 months if diagnosed with pre-diabetes Lab Results   Component Value Date    GLU 99 07/27/2023        Cardiovascular Disease Screening    Lipid Panel  Cholesterol  Lipoprotein (HDL)  Triglycerides Covered every 5 years for all Medicare beneficiaries without apparent signs or symptoms of cardiovascular disease Lab Results   Component Value Date    CHOLEST 239 (H) 07/27/2023    HDL 73 (H) 07/27/2023     (H) 07/27/2023    TRIG 169 (H) 07/27/2023         Electrocardiogram (EKG)   Covered if needed at Welcome to Medicare, and non-screening if indicated for medical reasons 05/23/2023      Ultrasound Screening for Abdominal Aortic Aneurysm (AAA) Covered once in a lifetime for one of the following risk factors    Men who are 73-68 years old and have ever smoked    Anyone with a family history - Colorectal Cancer Screening  Covered for ages 52-80; only need ONE of the following:    Colonoscopy   Covered every 10 years    Covered every 2 years if patient is at high risk or previous colonoscopy was abnormal 09/12/2018    Colorectal Cancer Screening due on 09/12/2023    Flexible Sigmoidoscopy   Covered every 4 years -    Fecal Occult Blood Test Covered annually -   Bone Density Screening    Bone density screening    Covered every 2 years after age 72 if diagnosed with risk of osteoporosis or estrogen deficiency. Covered yearly for long-term glucocorticoid medication use (Steroids) Last Dexa Scan:    XR DEXA BONE DENSITOMETRY (CPT=77080) 01/29/2015      No recommendations at this time   Pap and Pelvic    Pap   Covered every 2 years for women at normal risk;  Annually if at high risk -  No recommendations at this time    Chlamydia Annually if high risk -  No recommendations at this time   Screening Mammogram    Mammogram     Recommend annually for all female patients aged 36 and older    One baseline mammogram covered for patients aged 32-38 09/08/2022    Mammogram due on 09/08/2023    Immunizations    Influenza Covered once per flu season  Please get every year 10/07/2022  No recommendations at this time    Pneumococcal Each vaccine ([id number] & [id number]) covered once after 65 Prevnar 13: 05/31/2019    [id number]: 06/03/2020     No recommendations at this time    Hepatitis B One screening covered for patients with certain risk factors   -  No recommendations at this time    Tetanus Toxoid Not covered by Medicare Part B unless medically necessary (cut with metal); may be covered with your pharmacy prescription benefits 06/03/2020    Tetanus, Diptheria and Pertusis TD and TDaP Not covered by Medicare Part B -  No recommendations at this time    Zoster Not covered by Medicare Part B; may be covered with your pharmacy  prescription benefits 10/09/2014  No recommendations at this time

## 2023-08-02 ENCOUNTER — TELEPHONE (OUTPATIENT)
Dept: INTERNAL MEDICINE CLINIC | Facility: CLINIC | Age: 68
End: 2023-08-02

## 2023-08-02 RX ORDER — PHENTERMINE HYDROCHLORIDE 15 MG/1
15 CAPSULE ORAL EVERY MORNING
Qty: 30 CAPSULE | Refills: 1 | Status: SHIPPED | OUTPATIENT
Start: 2023-08-02

## 2023-08-02 NOTE — TELEPHONE ENCOUNTER
Patient saw Dr Milan Au yesterday for an annual physical and there was talk about a weight loss medication. Patient would like to start the medication. [facility]. Patient was not sure of the medication name.

## 2023-08-02 NOTE — TELEPHONE ENCOUNTER
Please notify patient that we will start with phentermine 15 mg once a day. This is a stimulant medication can cause insomnia, anxiety, dry mouth, increased heart rate/blood pressure. Should monitor closely with intermittent blood pressure checks. Sent to the pharmacy, should start tomorrow and take first thing in the morning.   Check in with us in about 3 weeks on how the medication is working for her via [address] [place] [address] [id number]

## 2023-09-12 ENCOUNTER — HOSPITAL ENCOUNTER (OUTPATIENT)
Dept: MAMMOGRAPHY | Facility: HOSPITAL | Age: 68
Discharge: HOME | End: 2023-09-12
Attending: INTERNAL MEDICINE
Payer: MEDICARE

## 2023-09-12 ENCOUNTER — HOSPITAL ENCOUNTER (OUTPATIENT)
Dept: BONE DENSITY | Facility: HOSPITAL | Age: 68
Discharge: HOME | End: 2023-09-12
Attending: INTERNAL MEDICINE
Payer: MEDICARE

## 2023-09-12 DIAGNOSIS — Z78.0 POSTMENOPAUSAL: ICD-10-CM

## 2023-09-12 DIAGNOSIS — R92.2 DENSE BREAST: ICD-10-CM

## 2023-09-12 DIAGNOSIS — Z12.31 ENCOUNTER FOR SCREENING MAMMOGRAM FOR MALIGNANT NEOPLASM OF BREAST: ICD-10-CM

## 2023-09-12 PROCEDURE — 77063 BREAST TOMOSYNTHESIS BI: CPT | Performed by: INTERNAL MEDICINE

## 2023-09-12 PROCEDURE — 77067 SCR MAMMO BI INCL CAD: CPT | Performed by: INTERNAL MEDICINE

## 2023-09-12 PROCEDURE — 77080 DXA BONE DENSITY AXIAL: CPT | Performed by: INTERNAL MEDICINE

## 2023-09-13 ENCOUNTER — TELEPHONE (OUTPATIENT)
Dept: INTERNAL MEDICINE CLINIC | Facility: CLINIC | Age: 68
End: 2023-09-13

## 2023-09-26 RX ORDER — ESCITALOPRAM OXALATE 20 MG/1
20 TABLET ORAL DAILY
Qty: 90 TABLET | Refills: 3 | Status: CANCELLED | OUTPATIENT
Start: 2023-09-26

## 2023-09-26 NOTE — TELEPHONE ENCOUNTER
1 year was sent in May    Current refill request refused due to refill is either a duplicate request or has active refills at the pharmacy. Check previous templates. Requested Prescriptions     Pending Prescriptions Disp Refills    escitalopram 20 MG Oral Tab 90 tablet 3     Sig: Take 1 tablet (20 mg total) by mouth daily.

## 2023-09-27 NOTE — TELEPHONE ENCOUNTER
1 year was sent in May. Mychart to pt     Current refill request refused due to refill is either a duplicate request or has active refills at the pharmacy. Check previous templates. Requested Prescriptions     Pending Prescriptions Disp Refills    escitalopram 20 MG Oral Tab 90 tablet 3     Sig: Take 1 tablet (20 mg total) by mouth daily.

## 2023-10-09 ENCOUNTER — TELEPHONE (OUTPATIENT)
Dept: INTERNAL MEDICINE CLINIC | Facility: CLINIC | Age: 68
End: 2023-10-09

## 2023-10-11 RX ORDER — PHENTERMINE HYDROCHLORIDE 15 MG/1
15 CAPSULE ORAL EVERY MORNING
Qty: 30 CAPSULE | Refills: 1 | Status: SHIPPED | OUTPATIENT
Start: 2023-10-11

## 2023-10-11 NOTE — TELEPHONE ENCOUNTER
To Dr PRO---please see patient's mychart response: Thank you for reaching out. I have had no adverse reactions or side effects from the medication. Dallas Torrezivers been able to lose six pounds and keep it off. My blood pressure has also been consistent. My reading today was 142/82. I hope this information helps. Thanks again. Lennie Mendes MD:  The above refill request is for a controlled substance. Please review pended medication order. Print and sign for staff to fax to pharmacy or prescribe electronically.     Last office visit: 8/1/23  Last time refill sent and quantity/refills: 8/2/23 #30 and 1 refill  Per [facility] last dispensed: 9/5/23 #30

## 2023-10-11 NOTE — TELEPHONE ENCOUNTER
On 8/2/23:   \"Please notify patient that we will start with phentermine 15 mg once a day. This is a stimulant medication can cause insomnia, anxiety, dry mouth, increased heart rate/blood pressure. Should monitor closely with intermittent blood pressure checks. Sent to the pharmacy, should start tomorrow and take first thing in the morning. Check in with us in about 3 weeks on how the medication is working for her via [employer]\"       [employer] sent to patient for update:    Pavel Hogue,  We received your refill request for phentermine. Can you please send Dr Norm Cotton an update with how the medication has been working for you? Is your body tolerating it or are you having any side effects? Dr Norm Cotton had also wanted you to monitor your blood pressure intermittently while taking this medication. How has your blood pressure been running? Please let us know. Thank you!!   Take care,  Jordan Haywood RN

## 2023-12-21 RX ORDER — AMLODIPINE BESYLATE 5 MG/1
5 TABLET ORAL DAILY
Qty: 90 TABLET | Refills: 3 | Status: CANCELLED | OUTPATIENT
Start: 2023-12-21

## 2023-12-22 NOTE — TELEPHONE ENCOUNTER
Current refill request refused due to refill is either a duplicate request or has active refills at the pharmacy. Check previous templates. Requested Prescriptions     Pending Prescriptions Disp Refills    amLODIPine 5 MG Oral Tab 90 tablet 3     Sig: Take 1 tablet (5 mg total) by mouth daily.

## 2023-12-26 ENCOUNTER — TELEPHONE (OUTPATIENT)
Dept: INTERNAL MEDICINE CLINIC | Facility: CLINIC | Age: 68
End: 2023-12-26

## 2023-12-26 DIAGNOSIS — Z78.9 WEIGHT LOSS ADVISED: Primary | ICD-10-CM

## 2023-12-27 NOTE — TELEPHONE ENCOUNTER
To Dr. PRO-    The above refill request is for a controlled substance.  Please review pended medication order.  LOV: 8/1  Prescribed: 30 with 1 10/11  : 11/9 30

## 2023-12-28 NOTE — TELEPHONE ENCOUNTER
Can we call and see if the patient is tolerating this medication without significant side effects?  Please also see how her weight management goals have been going.  If needed, we can increase the dosage of the medication to 30 mg

## 2024-01-02 NOTE — TELEPHONE ENCOUNTER
To Dr. KABA-- (see Dr. Dumont message below from 12/28)    S/w patient. She is out of medication and questions if the on-call provider can refill? Or does this have to wait for PCP?    Patient reports no side effects. Has been tolerating Rx well. Had been recording weight daily up until the holidays. Reports weight was 170lbs. Lost 8 lbs. However, because of the holiday, she believes weight may be back up to 176lb.

## 2024-01-03 RX ORDER — PHENTERMINE HYDROCHLORIDE 15 MG/1
15 CAPSULE ORAL EVERY MORNING
Qty: 30 CAPSULE | Refills: 0 | Status: SHIPPED | OUTPATIENT
Start: 2024-01-03

## 2024-03-13 RX ORDER — OMEPRAZOLE 40 MG/1
40 CAPSULE, DELAYED RELEASE ORAL 2 TIMES DAILY
Qty: 180 CAPSULE | Refills: 0 | Status: CANCELLED | OUTPATIENT
Start: 2024-03-13

## 2024-03-13 NOTE — TELEPHONE ENCOUNTER
Per MD record, pt has not been taking    Current refill request refused due to refill is either a duplicate request or has active refills at the pharmacy.  Check previous templates.    Requested Prescriptions     Pending Prescriptions Disp Refills    Omeprazole 40 MG Oral Capsule Delayed Release 180 capsule 0     Sig: Take 1 capsule (40 mg total) by mouth 2 (two) times daily.

## 2024-03-25 RX ORDER — ESTRADIOL 0.03 MG/D
1 FILM, EXTENDED RELEASE TRANSDERMAL
Qty: 8 PATCH | Refills: 11 | Status: SHIPPED | OUTPATIENT
Start: 2024-03-25

## 2024-03-25 NOTE — TELEPHONE ENCOUNTER
Refill request is for a maintenance medication and has met the criteria specified in the Ambulatory Medication Refill Standing Order for eligibility, visits, laboratory, alerts and was sent to the requested pharmacy.    Requested Prescriptions     Signed Prescriptions Disp Refills    estradiol 0.025 MG/24HR Transdermal Patch Biweekly 8 patch 11     Sig: APPLY 1 PATCH TOPICALLY TO THE SKIN 2 TIMES A WEEK     Authorizing Provider: LUIGI ROSADO     Ordering User: MEENA ARIAS

## 2024-04-24 ENCOUNTER — OFFICE VISIT (OUTPATIENT)
Dept: INTERNAL MEDICINE CLINIC | Facility: CLINIC | Age: 69
End: 2024-04-24
Payer: MEDICARE

## 2024-04-24 VITALS
OXYGEN SATURATION: 96 % | HEART RATE: 83 BPM | SYSTOLIC BLOOD PRESSURE: 146 MMHG | HEIGHT: 65.5 IN | WEIGHT: 186 LBS | DIASTOLIC BLOOD PRESSURE: 82 MMHG | BODY MASS INDEX: 30.62 KG/M2

## 2024-04-24 DIAGNOSIS — R10.9 ABDOMINAL PAIN, UNSPECIFIED ABDOMINAL LOCATION: Primary | ICD-10-CM

## 2024-04-24 PROCEDURE — 96127 BRIEF EMOTIONAL/BEHAV ASSMT: CPT | Performed by: INTERNAL MEDICINE

## 2024-04-24 PROCEDURE — 99214 OFFICE O/P EST MOD 30 MIN: CPT | Performed by: INTERNAL MEDICINE

## 2024-04-24 NOTE — PROGRESS NOTES
Lennie Richards is a 68 year old female.  Chief Complaint   Patient presents with    Checkup     C/O 'Stomach Spasms' on/off on either side of mid abdomen. This started when she first saw Julia Ngo and Omeprazole was started. GI states she is not due for Colonoscopy due to updated guidelines. She is leaving on an extended trip to [place] and does not want to run into health issue outside of the country. Unable to schedule with Dr. Dumont until August and requested today's appt with associate.      HPI:   Lennie Richards is a 68 year old female who presents for stomach spasms.    C/o abdominal \"spasms\" described as dull achey pain on both sides of her abdomen a/w nausea ongoing intermittently for almost a year.     Most recent sx began 3 days ago. Took ppi night prior to sx onset and bid x2 days after with tums with resolution of pain as of today.     Currently denies f/c, GERD, denies n/v/d/c, hematochezia, melena. Denies exacerbation of pain after eating. Denies dysuria, hematuria. Denies dyspnea.    Drinks 2 glasses of wine/night and uses NSAIDs occasionally (not on a weekly basis).    Wt Readings from Last 6 Encounters:   04/24/24 186 lb (84.4 kg)   08/01/23 180 lb (81.6 kg)   05/23/23 177 lb 3.2 oz (80.4 kg)   08/08/22 177 lb 3.2 oz (80.4 kg)   07/28/21 162 lb (73.5 kg)   06/03/20 168 lb (76.2 kg)     Body mass index is 30.48 kg/m².     Current Outpatient Medications   Medication Sig Dispense Refill    estradiol 0.025 MG/24HR Transdermal Patch Biweekly APPLY 1 PATCH TOPICALLY TO THE SKIN 2 TIMES A WEEK 8 patch 11    Phentermine HCl 15 MG Oral Cap Take 1 capsule (15 mg total) by mouth every morning. 30 capsule 0    OMEPRAZOLE 40 MG Oral Capsule Delayed Release TAKE 1 CAPSULE BY MOUTH EVERY MORNING AND EVERY NIGHT AT BEDTIME 180 capsule 0    escitalopram 20 MG Oral Tab Take 1 tablet (20 mg total) by mouth daily. 90 tablet 3    amLODIPine 5 MG Oral Tab Take 1 tablet (5 mg total) by mouth daily. 90  tablet 3    LUTEIN OR Take by mouth daily.      Bilberry, Vaccinium myrtillus, (BILBERRY OR) Take by mouth daily.      DHA-EPA-Coenzyme Q10-Vitamin E (COQ-10 & FISH OIL OR) Take 1 tablet by mouth daily.      Glucosamine-Chondroitin (GLUCOSAMINE CHONDR COMPLEX OR) Take 1 tablet by mouth daily.      Multiple Vitamin Oral Tab Take 1 tablet by mouth daily.      Cholecalciferol (VITAMIN D) 1000 UNITS Oral Tab Take  by mouth.      Calcium Carbonate-Vitamin D (OSCAL-500) 500-200 MG-UNIT Oral Tab Take 1 tablet by mouth daily.        Past Medical History:    Acne rosacea    Acute closed-angle glaucoma, right    Degenerative joint disease (DJD) of lumbar spine    xray lumbar , MRI lumbar -Dr Gant-DJD january L4-5; lumbar injections x2 in 2017 Dr Tello for R sciatica    Depression    treated by gyn after hysterectomy    DJD (degenerative joint disease), cervical    cervical spine xray --DJD and DDD    Hyperlipidemia    Hypertension, essential    Kidney stone    passed stone in , episode prior to , had cysto to remove stone.     Left knee DJD    Dr Ledesma gel injection    Screening for heart disease    CT heart calcium score 19 result 16.2.    Sensorineural hearing loss    audiologist Ritu Cazares      Past Surgical History:   Procedure Laterality Date    Colonoscopy  2018    Foot surgery      bunionectomy    Glaucoma surgery Bilateral 2021    laser     Hysterectomy      hyst and bso-done in [place] for bleeding fibroids    Laser surgery of eye Bilateral 2021    Dr Brandon for glaucoma both eyes.    Repair rotator cuff,acute Left 2016    Dr Ledesma      Family History   Problem Relation Age of Onset    Cancer Father 69         age 69 lung cancer (smoker)    Heart Disorder Mother 79         age 79 after thoracic aortic aneurysm    Hypertension Mother     Other (leukemia) Mother 70        myelolymphocytic leukemia    Alcohol and Other Disorders Associated Brother         cirrhosis     Hypertension Brother     Other (no children) Other     Stroke Maternal Grandmother          age 56      Social History:   Social History     Socioeconomic History    Marital status:    Tobacco Use    Smoking status: Former     Types: Cigarettes    Smokeless tobacco: Never   Vaping Use    Vaping status: Never Used   Substance and Sexual Activity    Alcohol use: Yes     Comment: 2 DRINKS A DAY    Drug use: No   Other Topics Concern    Caffeine Concern Yes     Comment: Coffee 2 cups daily    Reaction to local anesthetic No          REVIEW OF SYSTEMS:   GENERAL: feels well otherwise, denies f/c  CARDIOVASCULAR: denies chest pain, pressure, or palpitations  GI: +abdominal pain, + nausea, denies vomiting, d/c  : denies dysuria, hematuria    EXAM:   /82   Pulse 83   Ht 5' 5.5\" (1.664 m)   Wt 186 lb (84.4 kg)   SpO2 96%   BMI 30.48 kg/m²     GENERAL: well developed, well nourished, in no apparent distress  LUNGS: clear to auscultation b/l  CARDIO: RRR, normal S1S2, no m/r/g  GI: soft, + epigastric/LUQ ttp, ND, NABS, no HSM, no r/r/g  : no cva ttp b/l  NEURO: A&O x 3, moves all 4 extremities spontaneously      ASSESSMENT AND PLAN:   Lennie Richards is a 68 year old female who presents for stomach spasms.    Abdominal pain, unspecified abdominal location  - possibly gastritis/PUD vs pancreatitis vs biliary colic/cholecystitis, less likely cardiac origin given ttp on exam  - advised omeprazole daily x2 weeks and if no improvement, consider imaging and increase to bid dosing, GI for evaluation  - CBC W Differential W Platelet [E]; Future  - Comp Metabolic Panel (14) [E]; Future  - Lipase [E]; Future    RTC as previously scheduled with PCP or sooner PRN.    For E/M code - 30 minutes spent reviewing performing chart review, obtaining a history, performing a physical exam, reviewing the assessment/plan, placing orders, and completing documentation.     Lesa Castellon DO  2024  4:34 PM

## 2024-04-25 ENCOUNTER — LAB ENCOUNTER (OUTPATIENT)
Dept: LAB | Age: 69
End: 2024-04-25
Attending: INTERNAL MEDICINE
Payer: MEDICARE

## 2024-04-25 DIAGNOSIS — R10.9 ABDOMINAL PAIN, UNSPECIFIED ABDOMINAL LOCATION: ICD-10-CM

## 2024-04-25 LAB
ALBUMIN SERPL-MCNC: 4.3 G/DL (ref 3.2–4.8)
ALBUMIN/GLOB SERPL: 1.6 {RATIO} (ref 1–2)
ALP LIVER SERPL-CCNC: 127 U/L
ALT SERPL-CCNC: 280 U/L
ANION GAP SERPL CALC-SCNC: 5 MMOL/L (ref 0–18)
AST SERPL-CCNC: 88 U/L (ref ?–34)
BASOPHILS # BLD AUTO: 0.04 X10(3) UL (ref 0–0.2)
BASOPHILS NFR BLD AUTO: 0.6 %
BILIRUB SERPL-MCNC: 0.6 MG/DL (ref 0.2–1.1)
BUN BLD-MCNC: 17 MG/DL (ref 9–23)
BUN/CREAT SERPL: 16.7 (ref 10–20)
CALCIUM BLD-MCNC: 9.6 MG/DL (ref 8.7–10.4)
CHLORIDE SERPL-SCNC: 109 MMOL/L (ref 98–112)
CO2 SERPL-SCNC: 28 MMOL/L (ref 21–32)
CREAT BLD-MCNC: 1.02 MG/DL
DEPRECATED RDW RBC AUTO: 47.1 FL (ref 35.1–46.3)
EGFRCR SERPLBLD CKD-EPI 2021: 59 ML/MIN/1.73M2 (ref 60–?)
EOSINOPHIL # BLD AUTO: 0.07 X10(3) UL (ref 0–0.7)
EOSINOPHIL NFR BLD AUTO: 1.1 %
ERYTHROCYTE [DISTWIDTH] IN BLOOD BY AUTOMATED COUNT: 13.1 % (ref 11–15)
FASTING STATUS PATIENT QL REPORTED: NO
GLOBULIN PLAS-MCNC: 2.7 G/DL (ref 2.8–4.4)
GLUCOSE BLD-MCNC: 89 MG/DL (ref 70–99)
HCT VFR BLD AUTO: 41.4 %
HGB BLD-MCNC: 14 G/DL
IMM GRANULOCYTES # BLD AUTO: 0.01 X10(3) UL (ref 0–1)
IMM GRANULOCYTES NFR BLD: 0.2 %
LIPASE SERPL-CCNC: 49 U/L (ref 12–53)
LYMPHOCYTES # BLD AUTO: 1.77 X10(3) UL (ref 1–4)
LYMPHOCYTES NFR BLD AUTO: 28.1 %
MCH RBC QN AUTO: 32.5 PG (ref 26–34)
MCHC RBC AUTO-ENTMCNC: 33.8 G/DL (ref 31–37)
MCV RBC AUTO: 96.1 FL
MONOCYTES # BLD AUTO: 0.57 X10(3) UL (ref 0.1–1)
MONOCYTES NFR BLD AUTO: 9.1 %
NEUTROPHILS # BLD AUTO: 3.83 X10 (3) UL (ref 1.5–7.7)
NEUTROPHILS # BLD AUTO: 3.83 X10(3) UL (ref 1.5–7.7)
NEUTROPHILS NFR BLD AUTO: 60.9 %
OSMOLALITY SERPL CALC.SUM OF ELEC: 295 MOSM/KG (ref 275–295)
PLATELET # BLD AUTO: 256 10(3)UL (ref 150–450)
POTASSIUM SERPL-SCNC: 4.3 MMOL/L (ref 3.5–5.1)
PROT SERPL-MCNC: 7 G/DL (ref 5.7–8.2)
RBC # BLD AUTO: 4.31 X10(6)UL
SODIUM SERPL-SCNC: 142 MMOL/L (ref 136–145)
WBC # BLD AUTO: 6.3 X10(3) UL (ref 4–11)

## 2024-04-25 PROCEDURE — 36415 COLL VENOUS BLD VENIPUNCTURE: CPT

## 2024-04-25 PROCEDURE — 80053 COMPREHEN METABOLIC PANEL: CPT

## 2024-04-25 PROCEDURE — 85025 COMPLETE CBC W/AUTO DIFF WBC: CPT

## 2024-04-25 PROCEDURE — 83690 ASSAY OF LIPASE: CPT

## 2024-04-26 ENCOUNTER — TELEPHONE (OUTPATIENT)
Dept: INTERNAL MEDICINE CLINIC | Facility: CLINIC | Age: 69
End: 2024-04-26

## 2024-04-26 DIAGNOSIS — R94.5 ABNORMAL RESULTS OF LIVER FUNCTION STUDIES: ICD-10-CM

## 2024-04-26 DIAGNOSIS — R79.89 ELEVATED LFTS: Primary | ICD-10-CM

## 2024-04-27 ENCOUNTER — HOSPITAL ENCOUNTER (OUTPATIENT)
Dept: ULTRASOUND IMAGING | Facility: HOSPITAL | Age: 69
Discharge: HOME | End: 2024-04-27
Attending: INTERNAL MEDICINE
Payer: MEDICARE

## 2024-04-27 DIAGNOSIS — R79.89 ELEVATED LFTS: ICD-10-CM

## 2024-04-27 PROCEDURE — 76705 ECHO EXAM OF ABDOMEN: CPT | Performed by: INTERNAL MEDICINE

## 2024-04-29 ENCOUNTER — TELEPHONE (OUTPATIENT)
Dept: INTERNAL MEDICINE CLINIC | Facility: CLINIC | Age: 69
End: 2024-04-29

## 2024-04-29 DIAGNOSIS — K80.20 SYMPTOMATIC CHOLELITHIASIS: ICD-10-CM

## 2024-04-29 DIAGNOSIS — R79.89 ELEVATED LFTS: ICD-10-CM

## 2024-04-29 DIAGNOSIS — R93.5 ABNORMAL ABDOMINAL ULTRASOUND: Primary | ICD-10-CM

## 2024-04-29 NOTE — TELEPHONE ENCOUNTER
Called patient discussed results. CT A/P ordered, general surgery referral placed for sx cholelithiasis. Reports sx improvement. All questions were answered.

## 2024-04-30 ENCOUNTER — HOSPITAL ENCOUNTER (OUTPATIENT)
Dept: CT IMAGING | Facility: HOSPITAL | Age: 69
Discharge: HOME | End: 2024-04-30
Attending: INTERNAL MEDICINE
Payer: MEDICARE

## 2024-04-30 DIAGNOSIS — R93.5 ABNORMAL ABDOMINAL ULTRASOUND: ICD-10-CM

## 2024-04-30 PROCEDURE — 74177 CT ABD & PELVIS W/CONTRAST: CPT | Performed by: INTERNAL MEDICINE

## 2024-05-02 ENCOUNTER — TELEPHONE (OUTPATIENT)
Dept: INTERNAL MEDICINE CLINIC | Facility: CLINIC | Age: 69
End: 2024-05-02

## 2024-05-02 DIAGNOSIS — R93.5 ABNORMAL CT SCAN, PELVIS: Primary | ICD-10-CM

## 2024-05-03 NOTE — TELEPHONE ENCOUNTER
Lennie Richards  [facility] Clinical Staff (supporting Lesa Castellon DO)24 minutes ago (1:44 PM)     Dr. Castellon,  Thank you for the information regarding the CT scan. I’ve made an appointment for 5/17 to have the pelvic ultrasound.  However,  I will need another order from you to have labs done again in a week.   Secondly,  I am wondering if you could prescribe  an oral tablet to help me lose some weight. I need a boost to help me along with weight loss.     Thank you for all your help.      Lennie

## 2024-05-06 NOTE — TELEPHONE ENCOUNTER
Orders for liver function tests/hepatitis panel have been placed, is there additional labs she is looking for?    Consider telephone visit to discuss weight loss options.    Thank you!

## 2024-05-08 ENCOUNTER — VIRTUAL PHONE E/M (OUTPATIENT)
Dept: INTERNAL MEDICINE CLINIC | Facility: CLINIC | Age: 69
End: 2024-05-08

## 2024-05-08 DIAGNOSIS — T50.5X5A EXPOSURE TO PHENTERMINE, INITIAL ENCOUNTER: Primary | ICD-10-CM

## 2024-05-08 DIAGNOSIS — Z78.9 WEIGHT LOSS ADVISED: ICD-10-CM

## 2024-05-08 PROCEDURE — 99442 PHONE E/M BY PHYS 11-20 MIN: CPT | Performed by: INTERNAL MEDICINE

## 2024-05-08 RX ORDER — PHENTERMINE HYDROCHLORIDE 30 MG/1
30 CAPSULE ORAL EVERY MORNING
Qty: 30 CAPSULE | Refills: 1 | Status: SHIPPED | OUTPATIENT
Start: 2024-05-08

## 2024-05-08 NOTE — PROGRESS NOTES
Virtual Telephone Check-In    Lennie Richards verbally consents to a Virtual/Telephone Check-In visit on 05/08/24.  Patient has been referred to the [facility] website at [email or web address] to review the yearly Consent to Treat document.    Patient understands and accepts financial responsibility for any deductible, co-insurance and/or co-pays associated with this service.    Duration of the service: 11 minutes    Summary of topics discussed:     Subjective: Patient asking for oral medication for weight loss. Taking phentermine 15 mg daily, felt it was helpful at first but not anymore. Trying to adhere to lifestyle modifications.    Assessment & Plan:  Obesity  - Discussed weight loss medications. Declines GLP-1 due to cost. Patient 's weight has increased ~9 lbs over the last year despite phentermine. Discussed importance of lifestyle modifications. Patient is aware of side effects of phentermine, tolerating at 15 mg dose. Increase phentermine to 30 mg dose, repeat EKG.  - EKG 12 Lead to be performed at [facility], [place]; Future  - Phentermine HCl 30 MG Oral Cap; Take 1 capsule (30 mg total) by mouth every morning.  Dispense: 30 capsule; Refill: 1    RTC in 1 month or sooner PRN.    Lesa Castellon DO

## 2024-05-10 ENCOUNTER — LAB ENCOUNTER (OUTPATIENT)
Dept: LAB | Age: 69
End: 2024-05-10
Attending: INTERNAL MEDICINE

## 2024-05-10 DIAGNOSIS — R94.5 ABNORMAL RESULTS OF LIVER FUNCTION STUDIES: ICD-10-CM

## 2024-05-10 DIAGNOSIS — T50.5X5A EXPOSURE TO PHENTERMINE, INITIAL ENCOUNTER: ICD-10-CM

## 2024-05-10 DIAGNOSIS — R79.89 ELEVATED LFTS: ICD-10-CM

## 2024-05-10 LAB
ALBUMIN SERPL-MCNC: 4.5 G/DL (ref 3.2–4.8)
ALP LIVER SERPL-CCNC: 76 U/L
ALT SERPL-CCNC: 33 U/L
AST SERPL-CCNC: 21 U/L (ref ?–34)
ATRIAL RATE: 76 BPM
BILIRUB DIRECT SERPL-MCNC: 0.2 MG/DL (ref ?–0.3)
BILIRUB SERPL-MCNC: 0.7 MG/DL (ref 0.2–1.1)
HAV IGM SER QL: NONREACTIVE
HBV CORE IGM SER QL: NONREACTIVE
HBV SURFACE AG SERPL QL IA: NONREACTIVE
HCV AB SERPL QL IA: NONREACTIVE
P AXIS: 63 DEGREES
P-R INTERVAL: 148 MS
PROT SERPL-MCNC: 7.3 G/DL (ref 5.7–8.2)
Q-T INTERVAL: 402 MS
QRS DURATION: 102 MS
QTC CALCULATION (BEZET): 452 MS
R AXIS: 15 DEGREES
T AXIS: 40 DEGREES
VENTRICULAR RATE: 76 BPM

## 2024-05-10 PROCEDURE — 80074 ACUTE HEPATITIS PANEL: CPT

## 2024-05-10 PROCEDURE — 80076 HEPATIC FUNCTION PANEL: CPT

## 2024-05-10 PROCEDURE — 36415 COLL VENOUS BLD VENIPUNCTURE: CPT

## 2024-05-10 PROCEDURE — 93005 ELECTROCARDIOGRAM TRACING: CPT

## 2024-05-10 PROCEDURE — 93010 ELECTROCARDIOGRAM REPORT: CPT | Performed by: INTERNAL MEDICINE

## 2024-05-13 ENCOUNTER — TELEPHONE (OUTPATIENT)
Dept: INTERNAL MEDICINE CLINIC | Facility: CLINIC | Age: 69
End: 2024-05-13

## 2024-05-13 DIAGNOSIS — R94.31 ABNORMAL EKG: Primary | ICD-10-CM

## 2024-05-17 ENCOUNTER — HOSPITAL ENCOUNTER (OUTPATIENT)
Dept: ULTRASOUND IMAGING | Facility: HOSPITAL | Age: 69
Discharge: HOME | End: 2024-05-17
Attending: INTERNAL MEDICINE

## 2024-05-17 ENCOUNTER — TELEPHONE (OUTPATIENT)
Dept: INTERNAL MEDICINE CLINIC | Facility: CLINIC | Age: 69
End: 2024-05-17

## 2024-05-17 DIAGNOSIS — R93.5 ABNORMAL CT SCAN, PELVIS: ICD-10-CM

## 2024-05-17 DIAGNOSIS — R93.89 ABNORMAL PELVIC ULTRASOUND: Primary | ICD-10-CM

## 2024-05-17 PROCEDURE — 76856 US EXAM PELVIC COMPLETE: CPT | Performed by: INTERNAL MEDICINE

## 2024-05-17 PROCEDURE — 76830 TRANSVAGINAL US NON-OB: CPT | Performed by: INTERNAL MEDICINE

## 2024-05-20 ENCOUNTER — HOSPITAL ENCOUNTER (OUTPATIENT)
Dept: MRI IMAGING | Facility: HOSPITAL | Age: 69
Discharge: HOME | DRG: 419 | End: 2024-05-20
Attending: SURGERY

## 2024-05-20 ENCOUNTER — HOSPITAL ENCOUNTER (OUTPATIENT)
Dept: MRI IMAGING | Facility: HOSPITAL | Age: 69
DRG: 419 | End: 2024-05-20
Attending: SURGERY

## 2024-05-20 DIAGNOSIS — R79.89 ELEVATED LFTS: ICD-10-CM

## 2024-05-20 PROCEDURE — 74181 MRI ABDOMEN W/O CONTRAST: CPT | Performed by: SURGERY

## 2024-05-20 PROCEDURE — 76376 3D RENDER W/INTRP POSTPROCES: CPT | Performed by: SURGERY

## 2024-05-21 ENCOUNTER — HOSPITAL ENCOUNTER (INPATIENT)
Facility: HOSPITAL | Age: 69
LOS: 3 days | Discharge: HOME | DRG: 419 | End: 2024-05-24
Attending: EMERGENCY MEDICINE | Admitting: HOSPITALIST

## 2024-05-21 DIAGNOSIS — K80.50 CHOLEDOCHOLITHIASIS: Primary | ICD-10-CM

## 2024-05-21 DIAGNOSIS — K80.81 BILIARY CALCULUS OF OTHER SITE WITH OBSTRUCTION: ICD-10-CM

## 2024-05-21 LAB
ALBUMIN SERPL-MCNC: 4.3 G/DL (ref 3.2–4.8)
ALBUMIN/GLOB SERPL: 1.5 {RATIO} (ref 1–2)
ALP LIVER SERPL-CCNC: 98 U/L
ALT SERPL-CCNC: 105 U/L
AMYLASE SERPL-CCNC: 61 U/L (ref 30–118)
ANION GAP SERPL CALC-SCNC: 5 MMOL/L (ref 0–18)
AST SERPL-CCNC: 189 U/L (ref ?–34)
BASOPHILS # BLD AUTO: 0.02 X10(3) UL (ref 0–0.2)
BASOPHILS NFR BLD AUTO: 0.2 %
BILIRUB DIRECT SERPL-MCNC: 0.5 MG/DL (ref ?–0.3)
BILIRUB SERPL-MCNC: 1.2 MG/DL (ref 0.2–1.1)
BUN BLD-MCNC: 14 MG/DL (ref 9–23)
BUN/CREAT SERPL: 17.1 (ref 10–20)
CALCIUM BLD-MCNC: 9.5 MG/DL (ref 8.7–10.4)
CHLORIDE SERPL-SCNC: 107 MMOL/L (ref 98–112)
CO2 SERPL-SCNC: 28 MMOL/L (ref 21–32)
CREAT BLD-MCNC: 0.82 MG/DL
DEPRECATED RDW RBC AUTO: 43.5 FL (ref 35.1–46.3)
EGFRCR SERPLBLD CKD-EPI 2021: 77 ML/MIN/1.73M2 (ref 60–?)
EOSINOPHIL # BLD AUTO: 0.02 X10(3) UL (ref 0–0.7)
EOSINOPHIL NFR BLD AUTO: 0.2 %
ERYTHROCYTE [DISTWIDTH] IN BLOOD BY AUTOMATED COUNT: 12.6 % (ref 11–15)
GLOBULIN PLAS-MCNC: 2.8 G/DL (ref 2–3.5)
GLUCOSE BLD-MCNC: 112 MG/DL (ref 70–99)
HCT VFR BLD AUTO: 41.1 %
HGB BLD-MCNC: 14.4 G/DL
IMM GRANULOCYTES # BLD AUTO: 0.02 X10(3) UL (ref 0–1)
IMM GRANULOCYTES NFR BLD: 0.2 %
LACTATE SERPL-SCNC: 1.2 MMOL/L (ref 0.5–2)
LIPASE SERPL-CCNC: 58 U/L (ref 13–75)
LYMPHOCYTES # BLD AUTO: 0.94 X10(3) UL (ref 1–4)
LYMPHOCYTES NFR BLD AUTO: 10.2 %
MCH RBC QN AUTO: 32.7 PG (ref 26–34)
MCHC RBC AUTO-ENTMCNC: 35 G/DL (ref 31–37)
MCV RBC AUTO: 93.2 FL
MONOCYTES # BLD AUTO: 0.64 X10(3) UL (ref 0.1–1)
MONOCYTES NFR BLD AUTO: 6.9 %
NEUTROPHILS # BLD AUTO: 7.57 X10 (3) UL (ref 1.5–7.7)
NEUTROPHILS # BLD AUTO: 7.57 X10(3) UL (ref 1.5–7.7)
NEUTROPHILS NFR BLD AUTO: 82.3 %
OSMOLALITY SERPL CALC.SUM OF ELEC: 291 MOSM/KG (ref 275–295)
PLATELET # BLD AUTO: 230 10(3)UL (ref 150–450)
POTASSIUM SERPL-SCNC: 4.1 MMOL/L (ref 3.5–5.1)
PROT SERPL-MCNC: 7.1 G/DL (ref 5.7–8.2)
RBC # BLD AUTO: 4.41 X10(6)UL
SODIUM SERPL-SCNC: 140 MMOL/L (ref 136–145)
WBC # BLD AUTO: 9.2 X10(3) UL (ref 4–11)

## 2024-05-21 PROCEDURE — 99223 1ST HOSP IP/OBS HIGH 75: CPT | Performed by: INTERNAL MEDICINE

## 2024-05-21 RX ORDER — SODIUM CHLORIDE, SODIUM LACTATE, POTASSIUM CHLORIDE, CALCIUM CHLORIDE 600; 310; 30; 20 MG/100ML; MG/100ML; MG/100ML; MG/100ML
INJECTION, SOLUTION INTRAVENOUS CONTINUOUS
Status: DISCONTINUED | OUTPATIENT
Start: 2024-05-21 | End: 2024-05-23

## 2024-05-21 RX ORDER — FAMOTIDINE 10 MG/ML
20 INJECTION, SOLUTION INTRAVENOUS EVERY 12 HOURS
Status: DISCONTINUED | OUTPATIENT
Start: 2024-05-21 | End: 2024-05-24

## 2024-05-21 RX ORDER — ESCITALOPRAM OXALATE 20 MG/1
20 TABLET ORAL NIGHTLY
Status: DISCONTINUED | OUTPATIENT
Start: 2024-05-21 | End: 2024-05-24

## 2024-05-21 RX ORDER — HYDROCODONE BITARTRATE AND ACETAMINOPHEN 5; 325 MG/1; MG/1
2 TABLET ORAL EVERY 4 HOURS PRN
Status: DISCONTINUED | OUTPATIENT
Start: 2024-05-21 | End: 2024-05-23 | Stop reason: DRUGHIGH

## 2024-05-21 RX ORDER — MORPHINE SULFATE 4 MG/ML
4 INJECTION, SOLUTION INTRAMUSCULAR; INTRAVENOUS EVERY 2 HOUR PRN
Status: DISCONTINUED | OUTPATIENT
Start: 2024-05-21 | End: 2024-05-21

## 2024-05-21 RX ORDER — AMLODIPINE BESYLATE 5 MG/1
5 TABLET ORAL NIGHTLY
Status: DISCONTINUED | OUTPATIENT
Start: 2024-05-21 | End: 2024-05-24

## 2024-05-21 RX ORDER — MORPHINE SULFATE 4 MG/ML
4 INJECTION, SOLUTION INTRAMUSCULAR; INTRAVENOUS EVERY 2 HOUR PRN
Status: DISCONTINUED | OUTPATIENT
Start: 2024-05-21 | End: 2024-05-24

## 2024-05-21 RX ORDER — ACETAMINOPHEN 500 MG
500 TABLET ORAL EVERY 4 HOURS PRN
Status: DISCONTINUED | OUTPATIENT
Start: 2024-05-21 | End: 2024-05-23

## 2024-05-21 RX ORDER — ONDANSETRON 2 MG/ML
4 INJECTION INTRAMUSCULAR; INTRAVENOUS EVERY 6 HOURS PRN
Status: DISCONTINUED | OUTPATIENT
Start: 2024-05-21 | End: 2024-05-23

## 2024-05-21 RX ORDER — AMLODIPINE BESYLATE 5 MG/1
5 TABLET ORAL DAILY
Status: DISCONTINUED | OUTPATIENT
Start: 2024-05-21 | End: 2024-05-21

## 2024-05-21 RX ORDER — SODIUM CHLORIDE 9 MG/ML
INJECTION, SOLUTION INTRAVENOUS CONTINUOUS
Status: DISCONTINUED | OUTPATIENT
Start: 2024-05-21 | End: 2024-05-24

## 2024-05-21 RX ORDER — HYDROCODONE BITARTRATE AND ACETAMINOPHEN 5; 325 MG/1; MG/1
1 TABLET ORAL EVERY 4 HOURS PRN
Status: DISCONTINUED | OUTPATIENT
Start: 2024-05-21 | End: 2024-05-23 | Stop reason: DRUGHIGH

## 2024-05-21 RX ORDER — KETOROLAC TROMETHAMINE 15 MG/ML
15 INJECTION, SOLUTION INTRAMUSCULAR; INTRAVENOUS ONCE
Status: COMPLETED | OUTPATIENT
Start: 2024-05-21 | End: 2024-05-21

## 2024-05-21 RX ORDER — MORPHINE SULFATE 2 MG/ML
3 INJECTION, SOLUTION INTRAMUSCULAR; INTRAVENOUS EVERY 2 HOUR PRN
Status: DISCONTINUED | OUTPATIENT
Start: 2024-05-21 | End: 2024-05-21

## 2024-05-21 RX ORDER — MORPHINE SULFATE 2 MG/ML
2 INJECTION, SOLUTION INTRAMUSCULAR; INTRAVENOUS EVERY 2 HOUR PRN
Status: DISCONTINUED | OUTPATIENT
Start: 2024-05-21 | End: 2024-05-24

## 2024-05-21 RX ORDER — ESCITALOPRAM OXALATE 20 MG/1
20 TABLET ORAL DAILY
Status: DISCONTINUED | OUTPATIENT
Start: 2024-05-21 | End: 2024-05-21

## 2024-05-21 RX ORDER — ACETAMINOPHEN 325 MG/1
650 TABLET ORAL EVERY 4 HOURS PRN
Status: DISCONTINUED | OUTPATIENT
Start: 2024-05-21 | End: 2024-05-23

## 2024-05-21 RX ORDER — MORPHINE SULFATE 2 MG/ML
1 INJECTION, SOLUTION INTRAMUSCULAR; INTRAVENOUS EVERY 2 HOUR PRN
Status: DISCONTINUED | OUTPATIENT
Start: 2024-05-21 | End: 2024-05-24

## 2024-05-21 RX ORDER — ACETAMINOPHEN 500 MG
1000 TABLET ORAL ONCE
Status: DISCONTINUED | OUTPATIENT
Start: 2024-05-21 | End: 2024-05-22 | Stop reason: HOSPADM

## 2024-05-21 RX ORDER — HEPARIN SODIUM 5000 [USP'U]/ML
5000 INJECTION, SOLUTION INTRAVENOUS; SUBCUTANEOUS EVERY 8 HOURS SCHEDULED
Status: DISCONTINUED | OUTPATIENT
Start: 2024-05-21 | End: 2024-05-23

## 2024-05-21 NOTE — ED QUICK NOTES
Orders for admission, patient is aware of plan and ready to go upstairs. Any questions, please call ED RN Liliam at extension [phone].     Patient Covid vaccination status: Fully vaccinated     COVID Test Ordered in ED: None    COVID Suspicion at Admission: N/A    Running Infusions:      Mental Status/LOC at time of transport: A&O x 4    Other pertinent information:   CIWA score: N/A   NIH score:  N/A     20g L AC PIV  Pt on RA  Pt ambulatory by self

## 2024-05-21 NOTE — ED QUICK NOTES
Pt to ED tx room 37 from triage A&O x 4.  Pt was sent to ED by Dr. Magallon for direct admit, however, no hospital beds available at this time.  Pt is scheduled for surgery for gall bladder on Thurs.  Pt c/o abdominal pain upon arrival to ED tx room, denies any n/v/d/c,dysuria, fevers/chills.  Pt connected to cont SPO2 & BP.  Cart in low/locked position, side rails up x 2, call light w/ in reach.

## 2024-05-21 NOTE — H&P (VIEW-ONLY)
GASTROENTEROLOGY CONSULT NOTE      HPI:  Lennie Richards is a/a(n) 68 year old female who presents with right upper quadrant pain that had been present for over a year but subsequently worsened over the past week such that she underwent evaluation by surgery with plans for cholecystectomy this week but was found to have evidence of choledocholithiasis on MRCP.  She presented to the emergency room with continued abdominal pain and is being admitted for further evaluation.    Patient denies any jaundice, fevers, chills, evidence of GI blood loss or recent weight loss.  She denies any family history of any GI malignancies otherwise.  Pain has improved with pain medications.    Medical History:  Past Medical History:    Acne rosacea    Acute closed-angle glaucoma, right    Degenerative joint disease (DJD) of lumbar spine    xray lumbar 12/11, MRI lumbar 1/12-Dr Gant-DJD january L4-5; lumbar injections x2 in 2017 Dr Tello for R sciatica    Depression    treated by gyn after hysterectomy    DJD (degenerative joint disease), cervical    cervical spine xray -9/09-DJD and DDD    Esophageal reflux    Hearing impairment    NO HEARING AIDS    High blood pressure    Hyperlipidemia    Hypertension, essential    Kidney stone    passed stone in 2005, episode prior to 2005, had cysto to remove stone.     Left knee DJD    Dr Yipolecathy gel injection    Osteoarthritis    PONV (postoperative nausea and vomiting)    Screening for heart disease    CT heart calcium score 9/9/19 result 16.2.    Sensorineural hearing loss    audiologist Ritu Cazares    Visual impairment    GLASSES     Past Surgical History:   Procedure Laterality Date    Colonoscopy  06/2018    Foot surgery  2009    bunionectomy    Glaucoma surgery Bilateral 11/2021    laser     Hysterectomy  2005    hyst and bso-done in [place] for bleeding fibroids    Laser surgery of eye Bilateral 05/2021    Dr Brandon for glaucoma both eyes.    Repair rotator cuff,acute Left 5/2016     Dr Ledesma     Pt  reports that she has quit smoking. Her smoking use included cigarettes. She has never used smokeless tobacco. She reports current alcohol use. She reports that she does not use drugs.  Family History   Problem Relation Age of Onset    Cancer Father 69         age 69 lung cancer (smoker)    Heart Disorder Mother 79         age 79 after thoracic aortic aneurysm    Hypertension Mother     Other (leukemia) Mother 70        myelolymphocytic leukemia    Alcohol and Other Disorders Associated Brother         cirrhosis    Hypertension Brother     Other (no children) Other     Stroke Maternal Grandmother          age 56       Allergies:  Allergies   Allergen Reactions    Bactrim [Sulfamethoxazole W/Trimethoprim] RASH     ADULT ALLERGY, MANAGED AT HOME. NO CARDIAC/RESPIRATORY FAILURE, NO ORGAN DAMAGE NOTED.       Medications:  No current facility-administered medications for this encounter.  Current Outpatient Medications   Medication Sig Dispense Refill    Phentermine HCl 30 MG Oral Cap Take 1 capsule (30 mg total) by mouth every morning. 30 capsule 1    estradiol 0.025 MG/24HR Transdermal Patch Biweekly APPLY 1 PATCH TOPICALLY TO THE SKIN 2 TIMES A WEEK 8 patch 11    OMEPRAZOLE 40 MG Oral Capsule Delayed Release TAKE 1 CAPSULE BY MOUTH EVERY MORNING AND EVERY NIGHT AT BEDTIME 180 capsule 0    escitalopram 20 MG Oral Tab Take 1 tablet (20 mg total) by mouth daily. 90 tablet 3    amLODIPine 5 MG Oral Tab Take 1 tablet (5 mg total) by mouth daily. 90 tablet 3    LUTEIN OR Take by mouth daily.      Bilberry, Vaccinium myrtillus, (BILBERRY OR) Take by mouth daily.      DHA-EPA-Coenzyme Q10-Vitamin E (COQ-10 & FISH OIL OR) Take 1 tablet by mouth daily.      Glucosamine-Chondroitin (GLUCOSAMINE CHONDR COMPLEX OR) Take 1 tablet by mouth daily.      Multiple Vitamin Oral Tab Take 1 tablet by mouth daily.      Cholecalciferol (VITAMIN D) 1000 UNITS Oral Tab Take  by mouth.      Calcium  Carbonate-Vitamin D (OSCAL-500) 500-200 MG-UNIT Oral Tab Take 1 tablet by mouth daily.         Review of systems:  GENERAL: feels well otherwise  EYES: denies icterus  HEENT: denies jaundice, lymphadenopathy  LUNGS: denies shortness of breath with exertion  CARDIOVASCULAR: denies chest pain on exertion  GI: as stated above  MUSCULOSKELETAL: denies back pain  NEURO: denies headaches    PE:  Vitals:   BP Readings from Last 3 Encounters:   24 132/89   24 146/82   23 126/76      Wt Readings from Last 3 Encounters:   24 183 lb (83 kg)   24 183 lb (83 kg)   24 186 lb (84.4 kg)      BMI: Estimated body mass index is 29.54 kg/m² as calculated from the following:    Height as of this encounter: 5' 6\" (1.676 m).    Weight as of this encounter: 183 lb (83 kg).  General: AAOx3 in NAD  HEENT: No scleral icterus, no LAD  Lungs: CTA bilaterally, no wheezing or crackles  CV: RRR, S1S2,, no murmurs or rubs  Abdomen: normal active bowel sounds, soft, nontender, nondistended, no stigmata of liver disease  Extremities: No edema, no discoloration      Labs: Reviewed in chart   Latest Reference Range & Units 05/10/24 08:44 24 11:24   AST (SGOT) <=34 U/L 21 189 (H)   ALT (SGPT) 10 - 49 U/L 33 105 (H)   Total Bilirubin 0.2 - 1.1 mg/dL 0.7 1.2 (H)   Bilirubin, Direct <=0.3 mg/dL 0.2 0.5 (H)       Imagin24  MRI/MRCP:  CONCLUSION:   Severe cholelithiasis without MRI evidence of acute cholecystitis. Choledocholithiasis with dilated common bile duct. No intrahepatic biliary ductal dilation. 7 mm pancreatic tail IPMN.  Follow-up MRCP suggested in 12 months to demonstrate stability.     Endoscopy:   2018  Colonoscopy with polyps, repeat in 7-10 years    Assessment and Plan: Patient is a 68-year-old female who presents with evidence of choledocholithiasis and elevated LFTs with clinical symptoms consistent with biliary obstruction.  Plan for ERCP tomorrow, followed by cholecystectomy per  surgical team.  Risks, alternatives and benefits including but not limited to the risk of bleeding, perforation, and post ERCP pancreatitis were explained to the patient who voiced her understanding.

## 2024-05-21 NOTE — ED PROVIDER NOTES
Patient Seen in: [facility] Emergency Department    History     Chief Complaint   Patient presents with    Abdominal Pain       HPI    68-year-old female with a history of cholelithiasis found on MRCP yesterday to have choledocholithiasis.  Patient reports 1 year of intermittent right upper quadrant pain which worsened in intensity and frequency beginning 3 weeks ago.  She reports mild intermittent nausea as well.  Denies any fevers    History reviewed.   Past Medical History:    Acne rosacea    Acute closed-angle glaucoma, right    Degenerative joint disease (DJD) of lumbar spine    xray lumbar , MRI lumbar -Dr Gant-DJD january L4-5; lumbar injections x2 in 2017 Dr Tello for R sciatica    Depression    treated by gyn after hysterectomy    DJD (degenerative joint disease), cervical    cervical spine xray --DJD and DDD    Esophageal reflux    Hearing impairment    NO HEARING AIDS    High blood pressure    Hyperlipidemia    Hypertension, essential    Kidney stone    passed stone in , episode prior to , had cysto to remove stone.     Left knee DJD    Dr Ledesma gel injection    Osteoarthritis    PONV (postoperative nausea and vomiting)    Screening for heart disease    CT heart calcium score 19 result 16.2.    Sensorineural hearing loss    audiologist Ritu Cazares    Visual impairment    GLASSES       History reviewed.   Past Surgical History:   Procedure Laterality Date    Colonoscopy  2018    Foot surgery  2009    bunionectomy    Glaucoma surgery Bilateral 2021    laser     Hysterectomy      hyst and bso-done in [place] for bleeding fibroids    Laser surgery of eye Bilateral 2021    Dr Brandon for glaucoma both eyes.    Repair rotator cuff,acute Left 2016    Dr Ledesma         Medications :  (Not in a hospital admission)       Family History   Problem Relation Age of Onset    Cancer Father 69         age 69 lung cancer (smoker)    Heart Disorder Mother 79          age 79 after thoracic aortic aneurysm    Hypertension Mother     Other (leukemia) Mother 70        myelolymphocytic leukemia    Alcohol and Other Disorders Associated Brother         cirrhosis    Hypertension Brother     Other (no children) Other     Stroke Maternal Grandmother          age 56       Smoking Status:   Social History     Socioeconomic History    Marital status:    Tobacco Use    Smoking status: Former     Types: Cigarettes    Smokeless tobacco: Never   Vaping Use    Vaping status: Never Used   Substance and Sexual Activity    Alcohol use: Yes     Comment: 2 DRINKS A DAY    Drug use: No   Other Topics Concern    Caffeine Concern Yes     Comment: Coffee 2 cups daily    Reaction to local anesthetic No       Constitutional and vital signs reviewed.      Social History and Family History elements reviewed from today, pertinent positives to the presenting problem noted.    Physical Exam     ED Triage Vitals [24 1019]   /66   Pulse 68   Resp 18   Temp 98.6 °F (37 °C)   Temp src Temporal   SpO2 95 %   O2 Device None (Room air)       All measures to prevent infection transmission during my interaction with the patient were taken. The patient was already wearing a droplet mask on my arrival to the room. Personal protective equipment was worn throughout the duration of the exam.  Handwashing was performed prior to and after the exam.  Stethoscope and any equipment used during my examination was cleaned with super sani-cloth germicidal wipes following the exam.     Physical Exam    General: NAD  Head: Normocephalic and atraumatic.  Mouth/Throat/Ears/Nose: Oropharynx is clear    Eyes: Conjunctivae and EOM are normal.   Neck: Normal range of motion. Supple.   Cardiovascular: Normal rate, regular rhythm, normal heart sounds.  Respiratory/Chest: Clear and equal bilaterally. Exhibits no tenderness.  Gastrointestinal: Soft, RUQ ttp, non-distended. Bowel sounds are normal.   Musculoskeletal:No  swelling or deformity.   Neurological: Alert and appropriate. No focal deficits.   Skin: Skin is warm and dry. No pallor.  Psychiatric: Has a normal mood and affect.      ED Course        Labs Reviewed   COMP METABOLIC PANEL (14) - Abnormal; Notable for the following components:       Result Value    Glucose 112 (*)      (*)      (*)     Bilirubin, Total 1.2 (*)     All other components within normal limits   BILIRUBIN, DIRECT - Abnormal; Notable for the following components:    Bilirubin, Direct 0.5 (*)     All other components within normal limits   CBC W/ DIFFERENTIAL - Abnormal; Notable for the following components:    Lymphocyte Absolute 0.94 (*)     All other components within normal limits   LIPASE - Normal   AMYLASE - Normal   LACTIC ACID, PLASMA - Normal   CBC WITH DIFFERENTIAL WITH PLATELET    Narrative:     The following orders were created for panel order CBC With Differential With Platelet.                  Procedure                               Abnormality         Status                                     ---------                               -----------         ------                                     CBC W/ DIFFERENTIAL[[id number]]          Abnormal            Final result                                                 Please view results for these tests on the individual orders.   BLOOD CULTURE   BLOOD CULTURE       As Interpreted by me    Imaging Results Available and Reviewed while in ED: MRI ABDOMEN AND MRCP W/3D (CPT=74181/46528)    Result Date: 5/20/2024  CONCLUSION:   Severe cholelithiasis without MRI evidence of acute cholecystitis.  Choledocholithiasis with dilated common bile duct.  No intrahepatic biliary ductal dilation.  7 mm pancreatic tail IPMN.  Follow-up MRCP suggested in 12 months to demonstrate stability.    Dictated by (CST): Jose Luis Hollingsworth MD on 5/20/2024 at 8:07 PM     Finalized by (CST): Jose Luis Hollingsworth MD on 5/20/2024 at 8:11 PM         ED Medications  Administered:   Medications   sodium chloride 0.9 % IV bolus 1,000 mL (1,000 mL Intravenous New Bag 5/21/24 1126)   ketorolac (Toradol) 15 MG/ML injection 15 mg (15 mg Intravenous Given 5/21/24 1126)   piperacillin-tazobactam (Zosyn) 4.5 g in dextrose 5% 100 mL IVPB-ADDV (4.5 g Intravenous New Bag 5/21/24 1248)         MDM     Vitals:    05/21/24 1019 05/21/24 1130 05/21/24 1200   BP: 112/66 133/77 137/82   Pulse: 68 62 60   Resp: 18 16 18   Temp: 98.6 °F (37 °C)     TempSrc: Temporal     SpO2: 95% 96% 96%   Weight: 83 kg     Height: 167.6 cm (5' 6\")       *I personally reviewed and interpreted all ED vitals.    Pulse Ox: 95%, Room air, Normal     Monitor Interpretation:   normal sinus rhythm as interpreted by me.  The cardiac monitor was ordered given right upper quadrant pain.      Medical Decision Making      Differential Diagnosis/ Diagnostic Considerations: Cholelithiasis, choledocholithiasis.    Complicating Factors: The patient already has cholelithiasis to contribute to the complexity of this ED evaluation.    I reviewed prior chart records including MRCP/MRI abdomen report from yesterday prompting referral to the emergency department given signs of choledocholithiasis in addition to cholelithiasis without signs of cholecystitis.  Lab work here demonstrates mild elevation in transaminases, discussed with her general surgeon, Dr. Mock who had requested antibiotics and had already spoken with Dr. Wan with gastroenterology and plan  for ERCP tomorrow.  N.p.o. after midnight. Admitted to and discussed with Dr. Gaytan.     Admitted In stable condition.  Patient is comfortable with the plan.    Disposition and Plan     Clinical Impression:  1. Choledocholithiasis    2. Biliary calculus of other site with obstruction        Disposition:  Admit    Follow-up:  No follow-up provider specified.    Medications Prescribed:  Current Discharge Medication List          Hospital Problems       Present on Admission   Date Reviewed: 5/17/2024            ICD-10-CM Noted POA    Choledocholithiasis K80.50 5/21/2024 Unknown

## 2024-05-21 NOTE — PLAN OF CARE
Lennie admitted to unit from ED. IVF infusing. Zosyn. Currently denies pain. Clear liquids until midnight. ERCP scheduled for tomorrow at 1300. Pt and  at bedside oriented to room. All questions answered and all safety precautions in place.   Problem: Patient Centered Care  Goal: Patient preferences are identified and integrated in the patient's plan of care  Description: Interventions:  - What would you like us to know as we care for you?   - Provide timely, complete, and accurate information to patient/family  - Incorporate patient and family knowledge, values, beliefs, and cultural backgrounds into the planning and delivery of care  - Encourage patient/family to participate in care and decision-making at the level they choose  - Honor patient and family perspectives and choices  Outcome: Progressing     Problem: Patient/Family Goals  Goal: Patient/Family Long Term Goal  Description: Patient's Long Term Goal:     Interventions:  -   - See additional Care Plan goals for specific interventions  Outcome: Progressing  Goal: Patient/Family Short Term Goal  Description: Patient's Short Term Goal:     Interventions:   -   - See additional Care Plan goals for specific interventions  Outcome: Progressing     Problem: PAIN - ADULT  Goal: Verbalizes/displays adequate comfort level or patient's stated pain goal  Description: INTERVENTIONS:  - Encourage pt to monitor pain and request assistance  - Assess pain using appropriate pain scale  - Administer analgesics based on type and severity of pain and evaluate response  - Implement non-pharmacological measures as appropriate and evaluate response  - Consider cultural and social influences on pain and pain management  - Manage/alleviate anxiety  - Utilize distraction and/or relaxation techniques  - Monitor for opioid side effects  - Notify MD/LIP if interventions unsuccessful or patient reports new pain  - Anticipate increased pain with activity and pre-medicate as  appropriate  Outcome: Progressing     Problem: RISK FOR INFECTION - ADULT  Goal: Absence of fever/infection during anticipated neutropenic period  Description: INTERVENTIONS  - Monitor WBC  - Administer growth factors as ordered  - Implement neutropenic guidelines  Outcome: Progressing     Problem: SAFETY ADULT - FALL  Goal: Free from fall injury  Description: INTERVENTIONS:  - Assess pt frequently for physical needs  - Identify cognitive and physical deficits and behaviors that affect risk of falls.  - [place] fall precautions as indicated by assessment.  - Educate pt/family on patient safety including physical limitations  - Instruct pt to call for assistance with activity based on assessment  - Modify environment to reduce risk of injury  - Provide assistive devices as appropriate  - Consider OT/PT consult to assist with strengthening/mobility  - Encourage toileting schedule  Outcome: Progressing     Problem: DISCHARGE PLANNING  Goal: Discharge to home or other facility with appropriate resources  Description: INTERVENTIONS:  - Identify barriers to discharge w/pt and caregiver  - Include patient/family/discharge partner in discharge planning  - Arrange for needed discharge resources and transportation as appropriate  - Identify discharge learning needs (meds, wound care, etc)  - Arrange for interpreters to assist at discharge as needed  - Consider post-discharge preferences of patient/family/discharge partner  - Complete POLST form as appropriate  - Assess patient's ability to be responsible for managing their own health  - Refer to Case Management Department for coordinating discharge planning if the patient needs post-hospital services based on physician/LIP order or complex needs related to functional status, cognitive ability or social support system  Outcome: Progressing

## 2024-05-21 NOTE — ED QUICK NOTES
Pt resting on cart in low/locked position, side rails up x 2, call light w/ in reach.  Pt updated on room status, verbalized understanding.  Pt denies any new needs or complaints at this time.

## 2024-05-21 NOTE — CONSULTS
GASTROENTEROLOGY CONSULT NOTE      HPI:  Lennie Richards is a/a(n) 68 year old female who presents with right upper quadrant pain that had been present for over a year but subsequently worsened over the past week such that she underwent evaluation by surgery with plans for cholecystectomy this week but was found to have evidence of choledocholithiasis on MRCP.  She presented to the emergency room with continued abdominal pain and is being admitted for further evaluation.    Patient denies any jaundice, fevers, chills, evidence of GI blood loss or recent weight loss.  She denies any family history of any GI malignancies otherwise.  Pain has improved with pain medications.    Medical History:  Past Medical History:    Acne rosacea    Acute closed-angle glaucoma, right    Degenerative joint disease (DJD) of lumbar spine    xray lumbar 12/11, MRI lumbar 1/12-Dr Gant-DJD january L4-5; lumbar injections x2 in 2017 Dr Tello for R sciatica    Depression    treated by gyn after hysterectomy    DJD (degenerative joint disease), cervical    cervical spine xray -9/09-DJD and DDD    Esophageal reflux    Hearing impairment    NO HEARING AIDS    High blood pressure    Hyperlipidemia    Hypertension, essential    Kidney stone    passed stone in 2005, episode prior to 2005, had cysto to remove stone.     Left knee DJD    Dr Yipolecathy gel injection    Osteoarthritis    PONV (postoperative nausea and vomiting)    Screening for heart disease    CT heart calcium score 9/9/19 result 16.2.    Sensorineural hearing loss    audiologist Ritu Cazares    Visual impairment    GLASSES     Past Surgical History:   Procedure Laterality Date    Colonoscopy  06/2018    Foot surgery  2009    bunionectomy    Glaucoma surgery Bilateral 11/2021    laser     Hysterectomy  2005    hyst and bso-done in [place] for bleeding fibroids    Laser surgery of eye Bilateral 05/2021    Dr Brandon for glaucoma both eyes.    Repair rotator cuff,acute Left 5/2016     Dr Ledesma     Pt  reports that she has quit smoking. Her smoking use included cigarettes. She has never used smokeless tobacco. She reports current alcohol use. She reports that she does not use drugs.  Family History   Problem Relation Age of Onset    Cancer Father 69         age 69 lung cancer (smoker)    Heart Disorder Mother 79         age 79 after thoracic aortic aneurysm    Hypertension Mother     Other (leukemia) Mother 70        myelolymphocytic leukemia    Alcohol and Other Disorders Associated Brother         cirrhosis    Hypertension Brother     Other (no children) Other     Stroke Maternal Grandmother          age 56       Allergies:  Allergies   Allergen Reactions    Bactrim [Sulfamethoxazole W/Trimethoprim] RASH     ADULT ALLERGY, MANAGED AT HOME. NO CARDIAC/RESPIRATORY FAILURE, NO ORGAN DAMAGE NOTED.       Medications:  No current facility-administered medications for this encounter.  Current Outpatient Medications   Medication Sig Dispense Refill    Phentermine HCl 30 MG Oral Cap Take 1 capsule (30 mg total) by mouth every morning. 30 capsule 1    estradiol 0.025 MG/24HR Transdermal Patch Biweekly APPLY 1 PATCH TOPICALLY TO THE SKIN 2 TIMES A WEEK 8 patch 11    OMEPRAZOLE 40 MG Oral Capsule Delayed Release TAKE 1 CAPSULE BY MOUTH EVERY MORNING AND EVERY NIGHT AT BEDTIME 180 capsule 0    escitalopram 20 MG Oral Tab Take 1 tablet (20 mg total) by mouth daily. 90 tablet 3    amLODIPine 5 MG Oral Tab Take 1 tablet (5 mg total) by mouth daily. 90 tablet 3    LUTEIN OR Take by mouth daily.      Bilberry, Vaccinium myrtillus, (BILBERRY OR) Take by mouth daily.      DHA-EPA-Coenzyme Q10-Vitamin E (COQ-10 & FISH OIL OR) Take 1 tablet by mouth daily.      Glucosamine-Chondroitin (GLUCOSAMINE CHONDR COMPLEX OR) Take 1 tablet by mouth daily.      Multiple Vitamin Oral Tab Take 1 tablet by mouth daily.      Cholecalciferol (VITAMIN D) 1000 UNITS Oral Tab Take  by mouth.      Calcium  Carbonate-Vitamin D (OSCAL-500) 500-200 MG-UNIT Oral Tab Take 1 tablet by mouth daily.         Review of systems:  GENERAL: feels well otherwise  EYES: denies icterus  HEENT: denies jaundice, lymphadenopathy  LUNGS: denies shortness of breath with exertion  CARDIOVASCULAR: denies chest pain on exertion  GI: as stated above  MUSCULOSKELETAL: denies back pain  NEURO: denies headaches    PE:  Vitals:   BP Readings from Last 3 Encounters:   24 132/89   24 146/82   23 126/76      Wt Readings from Last 3 Encounters:   24 183 lb (83 kg)   24 183 lb (83 kg)   24 186 lb (84.4 kg)      BMI: Estimated body mass index is 29.54 kg/m² as calculated from the following:    Height as of this encounter: 5' 6\" (1.676 m).    Weight as of this encounter: 183 lb (83 kg).  General: AAOx3 in NAD  HEENT: No scleral icterus, no LAD  Lungs: CTA bilaterally, no wheezing or crackles  CV: RRR, S1S2,, no murmurs or rubs  Abdomen: normal active bowel sounds, soft, nontender, nondistended, no stigmata of liver disease  Extremities: No edema, no discoloration      Labs: Reviewed in chart   Latest Reference Range & Units 05/10/24 08:44 24 11:24   AST (SGOT) <=34 U/L 21 189 (H)   ALT (SGPT) 10 - 49 U/L 33 105 (H)   Total Bilirubin 0.2 - 1.1 mg/dL 0.7 1.2 (H)   Bilirubin, Direct <=0.3 mg/dL 0.2 0.5 (H)       Imagin24  MRI/MRCP:  CONCLUSION:   Severe cholelithiasis without MRI evidence of acute cholecystitis. Choledocholithiasis with dilated common bile duct. No intrahepatic biliary ductal dilation. 7 mm pancreatic tail IPMN.  Follow-up MRCP suggested in 12 months to demonstrate stability.     Endoscopy:   2018  Colonoscopy with polyps, repeat in 7-10 years    Assessment and Plan: Patient is a 68-year-old female who presents with evidence of choledocholithiasis and elevated LFTs with clinical symptoms consistent with biliary obstruction.  Plan for ERCP tomorrow, followed by cholecystectomy per  surgical team.  Risks, alternatives and benefits including but not limited to the risk of bleeding, perforation, and post ERCP pancreatitis were explained to the patient who voiced her understanding.

## 2024-05-21 NOTE — ED INITIAL ASSESSMENT (HPI)
Patient ambulatory to ED with complaint of abd pain. Scheduled for surgery Thursday. Was sent to be direct admit. No beds.       Patient is AXOX4.

## 2024-05-21 NOTE — ED QUICK NOTES
Pt resting on cart in low/locked position, side rails up x 2, call light w/ in reach.   Pt in NAD, VSS, breathing easy, non-labored.    Denies any new needs/complaints @ this time.

## 2024-05-21 NOTE — ED QUICK NOTES
Pt transported to floor via cart accompanied by pt transporter.  Pt left dept in NAD, VSS, A&O x 4.  Pt belongings verified w/ pt & accompanying pt to floor.

## 2024-05-21 NOTE — H&P
[facility]  part of [facility]  HISTORY AND PHYSICAL       Lennie Richards Patient Status:  Emergency    1955  68 year old CSN [id number]   Location 37/37 Attending Anders Mckenzie MD     PCP Lesa Castellon DO         DATE OF ADMISSION: 2024     CHIEF COMPLAINT: Abdominal pain    HISTORY OF PRESENT ILLNESS  This is a 68 year oldfemale who presented complaining of abdominal pain.  Patient states symptoms started around 1 week prior.  Patient described right upper quadrant pain, sharp in nature.  Patient initially was evaluated by her primary care physician who recommended further evaluation with surgery.  Patient saw the surgeon who recommended MRCP.  Patient stated she was called by surgery team after resulting MRCP and noted to signs of stones in the bile duct and recommended she present to the ED for further evaluation.  At time of interview, patient reports pain is improved with pain medications.  Denies current nausea or vomiting.  Patient otherwise denies chest pain, shortness of breath, fevers or chills.    PAST MEDICAL HISTORY  Past Medical History:    Acne rosacea    Acute closed-angle glaucoma, right    Degenerative joint disease (DJD) of lumbar spine    xray lumbar , MRI lumbar -Dr Gant-DJD january L4-5; lumbar injections x2 in 2017 Dr Tello for R sciatica    Depression    treated by gyn after hysterectomy    DJD (degenerative joint disease), cervical    cervical spine xray --DJD and DDD    Esophageal reflux    Hearing impairment    NO HEARING AIDS    High blood pressure    Hyperlipidemia    Hypertension, essential    Kidney stone    passed stone in , episode prior to , had cysto to remove stone.     Left knee DJD    Dr Ledesma gel injection    Osteoarthritis    PONV (postoperative nausea and vomiting)    Screening for heart disease    CT heart calcium score 19 result 16.2.    Sensorineural hearing loss    audiologist Ritu Cazares     Visual impairment    GLASSES        PAST SURGICAL HISTORY  Past Surgical History:   Procedure Laterality Date    Colonoscopy  06/2018    Foot surgery  2009    bunionectomy    Glaucoma surgery Bilateral 11/2021    laser     Hysterectomy  2005    hyst and bso-done in [place] for bleeding fibroids    Laser surgery of eye Bilateral 05/2021    Dr Brandon for glaucoma both eyes.    Repair rotator cuff,acute Left 5/2016    Dr Ledesma       ALLERGIES   Bactrim [sulfamethoxazole w/trimethoprim]    MEDICATIONS  Patient's Medications   New Prescriptions    No medications on file   Previous Medications    AMLODIPINE 5 MG ORAL TAB    Take 1 tablet (5 mg total) by mouth daily.    BILBERRY, VACCINIUM MYRTILLUS, (BILBERRY OR)    Take by mouth daily.    CALCIUM CARBONATE-VITAMIN D (OSCAL-500) 500-200 MG-UNIT ORAL TAB    Take 1 tablet by mouth daily.    CHOLECALCIFEROL (VITAMIN D) 1000 UNITS ORAL TAB    Take  by mouth.    DHA-EPA-COENZYME Q10-VITAMIN E (COQ-10 & FISH OIL OR)    Take 1 tablet by mouth daily.    ESCITALOPRAM 20 MG ORAL TAB    Take 1 tablet (20 mg total) by mouth daily.    ESTRADIOL 0.025 MG/24HR TRANSDERMAL PATCH BIWEEKLY    APPLY 1 PATCH TOPICALLY TO THE SKIN 2 TIMES A WEEK    GLUCOSAMINE-CHONDROITIN (GLUCOSAMINE CHONDR COMPLEX OR)    Take 1 tablet by mouth daily.    LUTEIN OR    Take by mouth daily.    MULTIPLE VITAMIN ORAL TAB    Take 1 tablet by mouth daily.    OMEPRAZOLE 40 MG ORAL CAPSULE DELAYED RELEASE    TAKE 1 CAPSULE BY MOUTH EVERY MORNING AND EVERY NIGHT AT BEDTIME    PHENTERMINE HCL 30 MG ORAL CAP    Take 1 capsule (30 mg total) by mouth every morning.   Modified Medications    No medications on file   Discontinued Medications    No medications on file       SOCIAL HISTORY  Social History     Socioeconomic History    Marital status:    Tobacco Use    Smoking status: Former     Types: Cigarettes    Smokeless tobacco: Never   Vaping Use    Vaping status: Never Used   Substance and Sexual Activity     Alcohol use: Yes     Comment: 2 DRINKS A DAY    Drug use: No   Other Topics Concern    Caffeine Concern Yes     Comment: Coffee 2 cups daily    Reaction to local anesthetic No       FAMILY HISTORY  Family History   Problem Relation Age of Onset    Cancer Father 69         age 69 lung cancer (smoker)    Heart Disorder Mother 79         age 79 after thoracic aortic aneurysm    Hypertension Mother     Other (leukemia) Mother 70        myelolymphocytic leukemia    Alcohol and Other Disorders Associated Brother         cirrhosis    Hypertension Brother     Other (no children) Other     Stroke Maternal Grandmother          age 56       PHYSICAL EXAM  Vital signs:  /89   Pulse 61   Temp 98.6 °F (37 °C) (Temporal)   Resp 17   Ht 5' 6\" (1.676 m)   Wt 183 lb (83 kg)   SpO2 96%   BMI 29.54 kg/m²      GENERAL:  Awake and alert, in no acute distress.  HEART:  Regular rhythm.  Regular rate   LUNGS:  Air entry was good.  No crackles or wheezes   ABDOMEN: Soft and mild tenderness to palpation of the right upper quadrant.    PSYCHIATRIC: Normal mood      IMAGING  MRI ABDOMEN AND MRCP W/3D (CPT=74181/15687)    Result Date: 2024  CONCLUSION:   Severe cholelithiasis without MRI evidence of acute cholecystitis.  Choledocholithiasis with dilated common bile duct.  No intrahepatic biliary ductal dilation.  7 mm pancreatic tail IPMN.  Follow-up MRCP suggested in 12 months to demonstrate stability.    Dictated by (CST): Jose Luis Hollingsworth MD on 2024 at 8:07 PM     Finalized by (CST): Jose Luis Hollingsworth MD on 2024 at 8:11 PM           Data:  Recent Labs   Lab 24  1124   RBC 4.41   HGB 14.4   HCT 41.1   MCV 93.2   MCH 32.7   MCHC 35.0   RDW 12.6   NEPRELIM 7.57   WBC 9.2   .0     Recent Labs   Lab 24  1124   *   BUN 14   CREATSERUM 0.82   CA 9.5   ALB 4.3      K 4.1      CO2 28.0   ALKPHO 98   *   *   BILT 1.2*   TP 7.1       ASSESSMENT/PLAN       Choledocholithiasis  -Patient presenting with right upper quadrant pain  -MRCP performed as outpatient noted severe cholelithiasis and choledocholithiasis with dilated common bile duct.  Did not note evidence of acute cholecystitis.  -LFT elevations noted, continue trending  -Start IV fluids and empiric antibiotics  -Pain control as able, close monitoring with narcotics  -GI consulted, awaiting recommendations regarding ERCP and timing.  -General surgery on consult, appreciate recommendations regarding timing of cholecystectomy.    HTN  - controlled  - CPM  - Monitor and adjust as needed    Hyperlipidemia  -Continue statin    Plan of care discussed with patient and  at bedside.  Discussed with ED physician and RN. Decision made that pt needs hospitalization for further management/monitoring.      Hayden Gaytan MD    This note was prepared using Dragon Medical voice recognition dictation software. As a result errors may occur. When identified these errors have been corrected. While every attempt is made to correct errors during dictation discrepancies may still exist

## 2024-05-22 ENCOUNTER — APPOINTMENT (OUTPATIENT)
Dept: GENERAL RADIOLOGY | Facility: HOSPITAL | Age: 69
DRG: 419 | End: 2024-05-22
Attending: INTERNAL MEDICINE

## 2024-05-22 ENCOUNTER — ANESTHESIA EVENT (OUTPATIENT)
Dept: ENDOSCOPY | Facility: HOSPITAL | Age: 69
DRG: 419 | End: 2024-05-22

## 2024-05-22 ENCOUNTER — ANESTHESIA (OUTPATIENT)
Dept: ENDOSCOPY | Facility: HOSPITAL | Age: 69
DRG: 419 | End: 2024-05-22

## 2024-05-22 LAB
ALBUMIN SERPL-MCNC: 3.7 G/DL (ref 3.2–4.8)
ALBUMIN/GLOB SERPL: 1.6 {RATIO} (ref 1–2)
ALP LIVER SERPL-CCNC: 96 U/L
ALT SERPL-CCNC: 218 U/L
AMYLASE SERPL-CCNC: 43 U/L (ref 30–118)
ANION GAP SERPL CALC-SCNC: 5 MMOL/L (ref 0–18)
AST SERPL-CCNC: 175 U/L (ref ?–34)
BASOPHILS # BLD AUTO: 0.02 X10(3) UL (ref 0–0.2)
BASOPHILS NFR BLD AUTO: 0.5 %
BILIRUB DIRECT SERPL-MCNC: 0.5 MG/DL (ref ?–0.3)
BILIRUB SERPL-MCNC: 1.2 MG/DL (ref 0.2–1.1)
BUN BLD-MCNC: 9 MG/DL (ref 9–23)
BUN/CREAT SERPL: 11.3 (ref 10–20)
CALCIUM BLD-MCNC: 8.9 MG/DL (ref 8.7–10.4)
CHLORIDE SERPL-SCNC: 111 MMOL/L (ref 98–112)
CO2 SERPL-SCNC: 28 MMOL/L (ref 21–32)
CREAT BLD-MCNC: 0.8 MG/DL
DEPRECATED RDW RBC AUTO: 44 FL (ref 35.1–46.3)
EGFRCR SERPLBLD CKD-EPI 2021: 79 ML/MIN/1.73M2 (ref 60–?)
EOSINOPHIL # BLD AUTO: 0.07 X10(3) UL (ref 0–0.7)
EOSINOPHIL NFR BLD AUTO: 1.6 %
ERYTHROCYTE [DISTWIDTH] IN BLOOD BY AUTOMATED COUNT: 12.8 % (ref 11–15)
GLOBULIN PLAS-MCNC: 2.3 G/DL (ref 2–3.5)
GLUCOSE BLD-MCNC: 100 MG/DL (ref 70–99)
HCT VFR BLD AUTO: 37.7 %
HGB BLD-MCNC: 13.1 G/DL
IMM GRANULOCYTES # BLD AUTO: 0 X10(3) UL (ref 0–1)
IMM GRANULOCYTES NFR BLD: 0 %
INR BLD: 0.99 (ref 0.8–1.2)
LIPASE SERPL-CCNC: 31 U/L (ref 12–53)
LYMPHOCYTES # BLD AUTO: 1.14 X10(3) UL (ref 1–4)
LYMPHOCYTES NFR BLD AUTO: 26 %
MAGNESIUM SERPL-MCNC: 2 MG/DL (ref 1.6–2.6)
MCH RBC QN AUTO: 32.4 PG (ref 26–34)
MCHC RBC AUTO-ENTMCNC: 34.7 G/DL (ref 31–37)
MCV RBC AUTO: 93.3 FL
MONOCYTES # BLD AUTO: 0.37 X10(3) UL (ref 0.1–1)
MONOCYTES NFR BLD AUTO: 8.4 %
NEUTROPHILS # BLD AUTO: 2.78 X10 (3) UL (ref 1.5–7.7)
NEUTROPHILS # BLD AUTO: 2.78 X10(3) UL (ref 1.5–7.7)
NEUTROPHILS NFR BLD AUTO: 63.5 %
OSMOLALITY SERPL CALC.SUM OF ELEC: 297 MOSM/KG (ref 275–295)
PHOSPHATE SERPL-MCNC: 3 MG/DL (ref 2.4–5.1)
PLATELET # BLD AUTO: 185 10(3)UL (ref 150–450)
POTASSIUM SERPL-SCNC: 3.5 MMOL/L (ref 3.5–5.1)
PROT SERPL-MCNC: 6 G/DL (ref 5.7–8.2)
PROTHROMBIN TIME: 13.7 SECONDS (ref 11.6–14.8)
RBC # BLD AUTO: 4.04 X10(6)UL
SODIUM SERPL-SCNC: 144 MMOL/L (ref 136–145)
WBC # BLD AUTO: 4.4 X10(3) UL (ref 4–11)

## 2024-05-22 PROCEDURE — 74330 X-RAY BILE/PANC ENDOSCOPY: CPT | Performed by: INTERNAL MEDICINE

## 2024-05-22 PROCEDURE — 0FC98ZZ EXTIRPATION OF MATTER FROM COMMON BILE DUCT, VIA NATURAL OR ARTIFICIAL OPENING ENDOSCOPIC: ICD-10-PCS | Performed by: INTERNAL MEDICINE

## 2024-05-22 PROCEDURE — 99233 SBSQ HOSP IP/OBS HIGH 50: CPT | Performed by: HOSPITALIST

## 2024-05-22 PROCEDURE — BF131ZZ FLUOROSCOPY OF GALLBLADDER AND BILE DUCTS USING LOW OSMOLAR CONTRAST: ICD-10-PCS | Performed by: INTERNAL MEDICINE

## 2024-05-22 RX ORDER — INDOCYANINE GREEN AND WATER 25 MG
5 KIT INJECTION ONCE
Status: COMPLETED | OUTPATIENT
Start: 2024-05-23 | End: 2024-05-23

## 2024-05-22 RX ORDER — ONDANSETRON 2 MG/ML
INJECTION INTRAMUSCULAR; INTRAVENOUS AS NEEDED
Status: DISCONTINUED | OUTPATIENT
Start: 2024-05-22 | End: 2024-05-22 | Stop reason: SURG

## 2024-05-22 RX ORDER — SODIUM CHLORIDE, SODIUM LACTATE, POTASSIUM CHLORIDE, CALCIUM CHLORIDE 600; 310; 30; 20 MG/100ML; MG/100ML; MG/100ML; MG/100ML
INJECTION, SOLUTION INTRAVENOUS CONTINUOUS
Status: DISCONTINUED | OUTPATIENT
Start: 2024-05-22 | End: 2024-05-22 | Stop reason: HOSPADM

## 2024-05-22 RX ORDER — MORPHINE SULFATE 4 MG/ML
2 INJECTION, SOLUTION INTRAMUSCULAR; INTRAVENOUS EVERY 10 MIN PRN
Status: DISCONTINUED | OUTPATIENT
Start: 2024-05-22 | End: 2024-05-22 | Stop reason: HOSPADM

## 2024-05-22 RX ORDER — INDOMETHACIN 100 MG
SUPPOSITORY, RECTAL RECTAL
Status: DISCONTINUED | OUTPATIENT
Start: 2024-05-22 | End: 2024-05-22 | Stop reason: HOSPADM

## 2024-05-22 RX ORDER — HYDROMORPHONE HYDROCHLORIDE 1 MG/ML
0.2 INJECTION, SOLUTION INTRAMUSCULAR; INTRAVENOUS; SUBCUTANEOUS EVERY 5 MIN PRN
Status: DISCONTINUED | OUTPATIENT
Start: 2024-05-22 | End: 2024-05-22 | Stop reason: HOSPADM

## 2024-05-22 RX ORDER — HYDROMORPHONE HYDROCHLORIDE 1 MG/ML
0.4 INJECTION, SOLUTION INTRAMUSCULAR; INTRAVENOUS; SUBCUTANEOUS EVERY 5 MIN PRN
Status: DISCONTINUED | OUTPATIENT
Start: 2024-05-22 | End: 2024-05-22 | Stop reason: HOSPADM

## 2024-05-22 RX ORDER — NALOXONE HYDROCHLORIDE 0.4 MG/ML
0.08 INJECTION, SOLUTION INTRAMUSCULAR; INTRAVENOUS; SUBCUTANEOUS AS NEEDED
Status: DISCONTINUED | OUTPATIENT
Start: 2024-05-22 | End: 2024-05-22 | Stop reason: HOSPADM

## 2024-05-22 RX ORDER — MORPHINE SULFATE 4 MG/ML
4 INJECTION, SOLUTION INTRAMUSCULAR; INTRAVENOUS EVERY 10 MIN PRN
Status: DISCONTINUED | OUTPATIENT
Start: 2024-05-22 | End: 2024-05-22 | Stop reason: HOSPADM

## 2024-05-22 RX ORDER — CALCIUM CARBONATE-CHOLECALCIFEROL TAB 250 MG-125 UNIT 250-125 MG-UNIT
1 TAB ORAL DAILY
Status: DISCONTINUED | OUTPATIENT
Start: 2024-05-22 | End: 2024-05-24

## 2024-05-22 RX ORDER — EPHEDRINE SULFATE 50 MG/ML
INJECTION INTRAVENOUS AS NEEDED
Status: DISCONTINUED | OUTPATIENT
Start: 2024-05-22 | End: 2024-05-22 | Stop reason: SURG

## 2024-05-22 RX ORDER — LIDOCAINE HYDROCHLORIDE 10 MG/ML
INJECTION, SOLUTION EPIDURAL; INFILTRATION; INTRACAUDAL; PERINEURAL AS NEEDED
Status: DISCONTINUED | OUTPATIENT
Start: 2024-05-22 | End: 2024-05-22 | Stop reason: SURG

## 2024-05-22 RX ORDER — DEXAMETHASONE SODIUM PHOSPHATE 4 MG/ML
VIAL (ML) INJECTION AS NEEDED
Status: DISCONTINUED | OUTPATIENT
Start: 2024-05-22 | End: 2024-05-22 | Stop reason: SURG

## 2024-05-22 RX ORDER — HYDROMORPHONE HYDROCHLORIDE 1 MG/ML
0.6 INJECTION, SOLUTION INTRAMUSCULAR; INTRAVENOUS; SUBCUTANEOUS EVERY 5 MIN PRN
Status: DISCONTINUED | OUTPATIENT
Start: 2024-05-22 | End: 2024-05-22 | Stop reason: HOSPADM

## 2024-05-22 RX ORDER — MORPHINE SULFATE 10 MG/ML
6 INJECTION, SOLUTION INTRAMUSCULAR; INTRAVENOUS EVERY 10 MIN PRN
Status: DISCONTINUED | OUTPATIENT
Start: 2024-05-22 | End: 2024-05-22 | Stop reason: HOSPADM

## 2024-05-22 RX ADMIN — DEXAMETHASONE SODIUM PHOSPHATE 4 MG: 4 MG/ML VIAL (ML) INJECTION at 13:56:00

## 2024-05-22 RX ADMIN — ONDANSETRON 4 MG: 2 INJECTION INTRAMUSCULAR; INTRAVENOUS at 13:56:00

## 2024-05-22 RX ADMIN — EPHEDRINE SULFATE 10 MG: 50 INJECTION INTRAVENOUS at 14:03:00

## 2024-05-22 RX ADMIN — LIDOCAINE HYDROCHLORIDE 50 MG: 10 INJECTION, SOLUTION EPIDURAL; INFILTRATION; INTRACAUDAL; PERINEURAL at 13:41:00

## 2024-05-22 NOTE — PLAN OF CARE
Patient is alert and oriented x4. RA. NPO at midnight. IVF Infusing. Zosyn started. She has a ERCP tomorrow at 1300. PRN Morphine for pain. Denies any nausea. Call light with in reach. All safety measures are in place.  Problem: Patient Centered Care  Goal: Patient preferences are identified and integrated in the patient's plan of care  Description: Interventions:  - Provide timely, complete, and accurate information to patient/family  - Incorporate patient and family knowledge, values, beliefs, and cultural backgrounds into the planning and delivery of care  - Encourage patient/family to participate in care and decision-making at the level they choose  - Honor patient and family perspectives and choices  Outcome: Progressing     al Care Plan goals for specific interventions  Outcome: Progressing     Problem: PAIN - ADULT  Goal: Verbalizes/displays adequate comfort level or patient's stated pain goal  Description: INTERVENTIONS:  - Encourage pt to monitor pain and request assistance  - Assess pain using appropriate pain scale  - Administer analgesics based on type and severity of pain and evaluate response  - Implement non-pharmacological measures as appropriate and evaluate response  - Consider cultural and social influences on pain and pain management  - Manage/alleviate anxiety  - Utilize distraction and/or relaxation techniques  - Monitor for opioid side effects  - Notify MD/LIP if interventions unsuccessful or patient reports new pain  - Anticipate increased pain with activity and pre-medicate as appropriate  Outcome: Progressing     Problem: RISK FOR INFECTION - ADULT  Goal: Absence of fever/infection during anticipated neutropenic period  Description: INTERVENTIONS  - Monitor WBC  - Administer growth factors as ordered  - Implement neutropenic guidelines  Outcome: Progressing     Problem: SAFETY ADULT - FALL  Goal: Free from fall injury  Description: INTERVENTIONS:  - Assess pt frequently for physical  needs  - Identify cognitive and physical deficits and behaviors that affect risk of falls.  - [place] fall precautions as indicated by assessment.  - Educate pt/family on patient safety including physical limitations  - Instruct pt to call for assistance with activity based on assessment  - Modify environment to reduce risk of injury  - Provide assistive devices as appropriate  - Consider OT/PT consult to assist with strengthening/mobility  - Encourage toileting schedule  Outcome: Progressing     Problem: DISCHARGE PLANNING  Goal: Discharge to home or other facility with appropriate resources  Description: INTERVENTIONS:  - Identify barriers to discharge w/pt and caregiver  - Include patient/family/discharge partner in discharge planning  - Arrange for needed discharge resources and transportation as appropriate  - Identify discharge learning needs (meds, wound care, etc)  - Arrange for interpreters to assist at discharge as needed  - Consider post-discharge preferences of patient/family/discharge partner  - Complete POLST form as appropriate  - Assess patient's ability to be responsible for managing their own health  - Refer to Case Management Department for coordinating discharge planning if the patient needs post-hospital services based on physician/LIP order or complex needs related to functional status, cognitive ability or social support system  Outcome: Progressing

## 2024-05-22 NOTE — ANESTHESIA PREPROCEDURE EVALUATION
Anesthesia PreOp Note    HPI:     Lennie Richards is a 68 year old female who presents for preoperative consultation requested by: Joanna Wan MD    Date of Surgery: 5/22/2024    Procedure(s):  ENDOSCOPIC RETROGRADE CHOLANGIOPANCREATOGRAPHY (ERCP)  Indication: none given    Relevant Problems   No relevant active problems       NPO:  Last Liquid Consumption Date: 05/22/24  Last Liquid Consumption Time: 0900 (states about 100 ml)  Last Solid Consumption Date: 05/20/24     Last Liquid Consumption Date: 05/22/24          History Review:  Patient Active Problem List    Diagnosis Date Noted   • Choledocholithiasis 05/21/2024   • Biliary calculus of other site with obstruction 05/21/2024   • Sensorineural hearing loss, bilateral 05/21/2018   • Hypertension, essential 04/25/2018   • Hypercholesterolemia 04/25/2018   • Depression 06/26/2015   • DJD (degenerative joint disease) 06/26/2015       Past Medical History:   • Acne rosacea   • Acute closed-angle glaucoma, right   • Degenerative joint disease (DJD) of lumbar spine    xray lumbar 12/11, MRI lumbar 1/12-Dr Gant-DJD january L4-5; lumbar injections x2 in 2017 Dr Tello for R sciatica   • Depression    treated by gyn after hysterectomy   • DJD (degenerative joint disease), cervical    cervical spine xray -9/09-DJD and DDD   • Esophageal reflux   • Hearing impairment    NO HEARING AIDS   • High blood pressure   • Hyperlipidemia   • Hypertension, essential   • Kidney stone    passed stone in 2005, episode prior to 2005, had cysto to remove stone.    • Left knee DJD    Dr Ledesma gel injection   • Osteoarthritis   • PONV (postoperative nausea and vomiting)   • Screening for heart disease    CT heart calcium score 9/9/19 result 16.2.   • Sensorineural hearing loss    audiologist Ritu Cazares   • Visual impairment    GLASSES       Past Surgical History:   Procedure Laterality Date   • Colonoscopy  06/2018   • Foot surgery  2009    bunionectomy   • Glaucoma surgery  Bilateral 11/2021    laser    • Hysterectomy  2005    hyst and bso-done in [place] for bleeding fibroids   • Laser surgery of eye Bilateral 05/2021    Dr Brandon for glaucoma both eyes.   • Repair rotator cuff,acute Left 5/2016    Dr Ledesma       Medications Prior to Admission   Medication Sig Dispense Refill Last Dose   • Phentermine HCl 30 MG Oral Cap Take 1 capsule (30 mg total) by mouth every morning. 30 capsule 1    • estradiol 0.025 MG/24HR Transdermal Patch Biweekly APPLY 1 PATCH TOPICALLY TO THE SKIN 2 TIMES A WEEK 8 patch 11 5/20/2024   • OMEPRAZOLE 40 MG Oral Capsule Delayed Release TAKE 1 CAPSULE BY MOUTH EVERY MORNING AND EVERY NIGHT AT BEDTIME (Patient taking differently: Take 1 capsule (40 mg total) by mouth every morning.) 180 capsule 0 5/21/2024   • escitalopram 20 MG Oral Tab Take 1 tablet (20 mg total) by mouth daily. (Patient taking differently: Take 1 tablet (20 mg total) by mouth at bedtime.) 90 tablet 3 5/20/2024   • amLODIPine 5 MG Oral Tab Take 1 tablet (5 mg total) by mouth daily. (Patient taking differently: Take 1 tablet (5 mg total) by mouth at bedtime.) 90 tablet 3 5/20/2024   • LUTEIN OR Take by mouth daily.   Past Week   • Bilberry, Vaccinium myrtillus, (BILBERRY OR) Take by mouth daily.   Past Week   • DHA-EPA-Coenzyme Q10-Vitamin E (COQ-10 & FISH OIL OR) Take 1 tablet by mouth daily.   Past Week   • Glucosamine-Chondroitin (GLUCOSAMINE CHONDR COMPLEX OR) Take 1 tablet by mouth daily.   Past Week   • Multiple Vitamin Oral Tab Take 1 tablet by mouth daily.   Past Week   • Cholecalciferol (VITAMIN D) 1000 UNITS Oral Tab Take  by mouth.   Past Week   • Calcium Carbonate-Vitamin D (OSCAL-500) 500-200 MG-UNIT Oral Tab Take 1 tablet by mouth daily.   Past Week     Current Facility-Administered Medications Ordered in Epic   Medication Dose Route Frequency Provider Last Rate Last Admin   • [START ON 5/23/2024] indocyanine green (IC-Green) injection 5 mg  5 mg Intravenous Once Nish  MD Go       • famotidine (Pepcid) 20 mg/2mL injection 20 mg  20 mg Intravenous Q12H Go Mock MD   20 mg at 05/22/24 0455   • piperacillin-tazobactam (Zosyn) 3.375 g in dextrose 5% 100 mL IVPB-ADDV  3.375 g Intravenous Q8H Hayden Gaytan MD 25 mL/hr at 05/22/24 1204 3.375 g at 05/22/24 1204   • sodium chloride 0.9% infusion   Intravenous Continuous Hayden Gaytan  mL/hr at 05/22/24 0458 New Bag at 05/22/24 0458   • heparin (Porcine) 5000 UNIT/ML injection 5,000 Units  5,000 Units Subcutaneous Q8H LURDES Hayden Gaytan MD   5,000 Units at 05/21/24 2142   • acetaminophen (Tylenol Extra Strength) tab 500 mg  500 mg Oral Q4H PRN Hayden Gaytan MD       • acetaminophen (Tylenol) tab 650 mg  650 mg Oral Q4H PRN Hayden Gaytan MD        Or   • HYDROcodone-acetaminophen (Norco) 5-325 MG per tab 1 tablet  1 tablet Oral Q4H PRN Hayden Gaytan MD        Or   • HYDROcodone-acetaminophen (Norco) 5-325 MG per tab 2 tablet  2 tablet Oral Q4H PRN Hayden Gaytan MD       • morphINE PF 2 MG/ML injection 1 mg  1 mg Intravenous Q2H PRN Hayden Gaytan MD        Or   • morphINE PF 2 MG/ML injection 2 mg  2 mg Intravenous Q2H PRN Hayden Gaytan MD   2 mg at 05/21/24 1927    Or   • morphINE PF 4 MG/ML injection 4 mg  4 mg Intravenous Q2H PRN Hayden Gaytan MD       • ondansetron (Zofran) 4 MG/2ML injection 4 mg  4 mg Intravenous Q6H PRN Hayden Gaytan MD       • lactated ringers infusion   Intravenous Continuous Go Mock MD       • acetaminophen (Tylenol Extra Strength) tab 1,000 mg  1,000 mg Oral Once Go Mock MD       • amLODIPine (Norvasc) tab 5 mg  5 mg Oral Nightly Jaclyn Saunders MD   5 mg at 05/21/24 2000   • escitalopram (Lexapro) tab 20 mg  20 mg Oral Nightly Jaclyn Saunders MD   20 mg at 05/21/24 2000     No current Epic-ordered outpatient medications on file.       Allergies   Allergen Reactions   • Bactrim [Sulfamethoxazole  W/Trimethoprim] RASH     ADULT ALLERGY, MANAGED AT HOME. NO CARDIAC/RESPIRATORY FAILURE, NO ORGAN DAMAGE NOTED.       Family History   Problem Relation Age of Onset   • Cancer Father 69         age 69 lung cancer (smoker)   • Heart Disorder Mother 79         age 79 after thoracic aortic aneurysm   • Hypertension Mother    • Other (leukemia) Mother 70        myelolymphocytic leukemia   • Alcohol and Other Disorders Associated Brother         cirrhosis   • Hypertension Brother    • Other (no children) Other    • Stroke Maternal Grandmother          age 56     Social History     Socioeconomic History   • Marital status:    Tobacco Use   • Smoking status: Former     Types: Cigarettes   • Smokeless tobacco: Never   Vaping Use   • Vaping status: Never Used   Substance and Sexual Activity   • Alcohol use: Yes     Comment: 2 DRINKS A DAY   • Drug use: No   Other Topics Concern   • Caffeine Concern Yes     Comment: Coffee 2 cups daily   • Reaction to local anesthetic No       Available pre-op labs reviewed.  Lab Results   Component Value Date    WBC 4.4 2024    RBC 4.04 2024    HGB 13.1 2024    HCT 37.7 2024    MCV 93.3 2024    MCH 32.4 2024    MCHC 34.7 2024    RDW 12.8 2024    .0 2024     Lab Results   Component Value Date     2024    K 3.5 2024     2024    CO2 28.0 2024    BUN 9 2024    CREATSERUM 0.80 2024     (H) 2024    CA 8.9 2024     Lab Results   Component Value Date    INR 0.99 2024       Vital Signs:  Body mass index is 29.86 kg/m².   height is 1.676 m (5' 6\") and weight is 83.9 kg (185 lb). Her oral temperature is 98.1 °F (36.7 °C). Her blood pressure is 146/83 and her pulse is 57. Her respiration is 16 and oxygen saturation is 93%.   Vitals:    24 1940 24 2007 24 0501 24 1206   BP: (!) 163/88 146/90 143/86 146/83   Pulse: 57  60 57    Resp: 18  18 16   Temp: 97.9 °F (36.6 °C)  97.8 °F (36.6 °C) 98.1 °F (36.7 °C)   TempSrc: Oral  Oral Oral   SpO2: 95%  99% 93%   Weight:       Height:            Anesthesia Evaluation     Patient summary reviewed and Nursing notes reviewed    History of anesthetic complications   Airway   Mallampati: II  Dental      Pulmonary    (-) asthma, sleep apnea  Cardiovascular   (+) hypertension    Rate: normal    Neuro/Psych    (+)   depression    (-) CVA    GI/Hepatic/Renal    (+) GERD    Endo/Other    Abdominal                Anesthesia Plan:   ASA:  2  Plan:   General    I have informed Lennie Richards and/or legal guardian or family member of the nature of the anesthetic plan, benefits, risks including possible dental damage if relevant, major complications, and any alternative forms of anesthetic management.   All of the patient's questions were answered to the best of my ability. The patient desires the anesthetic management as planned.  Afshan Mcleod, CRNA  5/22/2024 12:51 PM  Present on Admission:  **None**

## 2024-05-22 NOTE — OPERATIVE REPORT
ERCP Operative Report    Lennie Richards Patient Status:  Inpatient    1955 MRN [id number]   Location [facility] ENDOSCOPY LAB SUITES Attending Rose Tan MD   Hosp Day #   1 PCP Lesa Castellon DO       Pre-op Diagnosis: Elevated LFTs, abdominal pain, abnormal MRCP with choledocholithiasis    Post-Op Diagnosis:  Choledocholithiasis    Pre-procedure: The patient was assessed in the procedure room immediately before induction of sedation which included, at a minimum, vital signs, NPO status, and airway assessment.  The patient was deemed and appropriate candidate for procedural sedation.    Informed Consent: Informed consent for both the procedure and sedation were obtained from the patient.  The risks, benefits and alternatives to the procedure including potentially life-threatening complications of sedation, bleeding, perforation, missed lesions or need for repeat endoscopy were reviewed along with the possible need for hospitalization, surgical management, transfusion of blood products or repeat endoscopy should one of these complications arise.  The patient and/or POA voiced their understanding and was agreeable to proceed.     Sedation Type: General-Patient received sedation under general anesthesia provided by an anesthesiologist    Moderate Sedation Time:  A trained sedation nurse was present to assist in monitoring the patient during the entire length of moderate sedation time.    Procedure Description: The patient was placed in the left lateral prone position.  A bite block was placed in the patient's mouth and the endoscope was passed through the mouth under direct vision and advanced to the second portion of the duodenum to the level of the major papilla.  The ERCP was performed without difficulty, the patient tolerated the procedure  well with no immediate complications. The patient was transferred to the recovery area in stable condition.   Findings: Esophagus, stomach and duodenal bulb were grossly normal.  Scope was advanced to the level of the native major papilla.  Initial cannulation was made using an 0.035 wire through a-sphincter tone and the wire was advanced up into the intrahepatics.  Initial cholangiogram showed a dilated CBD of 8 mm with several filling defects in the distal common bile duct.  A 1 cm sphincterotomy was made and the sphincterotome exchanged over wire with a 9-12 balloon.  Sequential balloon sweeps, initially using the 9 mm balloon with the final sleep using the 12 mm balloon revealed clearance of several small gallstone and final balloon occlusion cholangiogram showed no further filling defects.  Bile was noted to be flowing freely and decision was made not to place a stent.  Wire, balloon and scope where removed from the patient and procedure terminated.  Indomethacin given prophylactically after procedure.  Impression:  Choledocholithiasis, status post sphincterotomy and balloon sweep with complete clearance  Recommendations:  Return patient to floor when discharge criteria are met, continue to monitor clinically and plan for cholecystectomy per surgery recommendations    Joanna Wan MD  5/22/2024  2:11 PM

## 2024-05-22 NOTE — DISCHARGE INSTRUCTIONS
Dr. Go Mock   [phone]    DISCHARGE INSTRUCTIONS- ROBOTIC CHOLECYSTECTOMY      Activity can be resumed as tolerated including walking, stairs, light exercise and driving short distances.  Heavy lifting (i.e. objects > 15-20 lbs.) is to be avoided for 3-4 weeks.  Normal time off work is one to two weeks.    Incisional  pains are commonly of moderate intensity in the first 24-48 hours and gradually improve over the initial post-operative week.  Please take tylenol extra strength 1   500mg tabs every 6 hours around the clock.  Also take  Aleve 1 tablets every 12 hours around the clock.   Prescription pain medication (Norco) should be used initially according to the pain experienced and gradually weaned over the next few days. Please be aware that the prescription pain medication contains the equivalent to 1 dose of Tylenol. Do not exceed 3000mg of Tylenol in a 24 hour period. Prescription pain medication can make you nauseated, light-headed and constipated: it should be taken with food and discontinued if side-effects develop.  A prescription for  stool softener  (Colace) is helpful to avoid constipation. Take 1 orally twice a day.   If  no bowel movement within 48 hours, MOM 30 mls may be helpful.    Your diet should consist primarily of liquids until you have a good appetite, usually the first day after surgery.  Fatty or fried foods should be avoided in the first week or two after surgery but subsequently a general diet may be resumed.    The dressing should not be removed until the first office visit.  You may shower in 24 hours, no bath or swimming for 4 weeks from your surgery date.  Apply an ice bag over your dressing for 72 hours.  No driving for 5 days or until off pain medication.   If the gauze dressing gets wet remove the Tegaderm and gauze but leave the Steri-Strips in place.    Activity after surgery  After surgery, take it easy for the rest of the day. If you had general anesthesia, don’t  use machinery or power tools, drink alcohol, or make any major decisions for at least the first 24 hours.  Don’t drive while you are still taking opioid pain medication, and don’t drive u.ntil you are able to step firmly on the brake pedal without hesitation.  Ask others to help with chores and errands while you recover.  Don’t lift anything heavier than 10 pounds until your doctor says it’s OK.  Don’t mow the lawn, shovel snow, use a vacuum , or do other strenuous activities until your doctor says it’s okay.  Walk as often as you feel able.  Continue the coughing and deep breathing exercises that you learned in the hospital.  Ask your doctor when you can expect to return to work.  Avoid constipation:  Eat fruits, vegetables, and whole grains   Drink 6-8 glasses of water a day, unless otherwise instructed.  Use a laxative or a mild stool softener as instructed by your doctor.  Call your doctor, or the 24-hour answering service, immediately if you have any of the following:  Yellowing of your eyes or skin (jaundice)  Chills  Fever above 101.5°F or 38.5°C    Redness, swelling, increasing pain, pus, or a foul smell at the incision site  Dark or rust-colored urine  Stool that is nely-colored or light in color instead of brown  Increasing abdominal pain  persistent nausea or bowel problems, uncontrolled pain or poor appetite     Contact the office tomorrow to make a follow appointment with Gigi Duran    for 6.4.24 after surgery.    Instructions given by EVELYN Zhou PA-C  [address] [address]  [phone]  [phone]      Go Mock MD. [facility]  OFFICE [phone]    5/23/2024  1:29 PM          POST-ERCP DISCHARGE INSTRUCTIONS      PROCEDURE PERFORMED:  ERCP with sphincterotomy and balloon sweep    ENDOSCOPIST: Joanna Wan MD    FINDINGS:   Choledocholithiasis    MEDICATIONS: You may resume all other medications today    DIET: You may resume your regular diet today.    BIOPSIES: No biopsies were taken    X-RAYS: No X-rays/Labs were ordered today        Activity for remainder of today:  REST TODAY  DO NOT drive or operate heavy machinery  DO NOT drink any alcoholic beverages  DO NOT sign any legal documents or make any important decisions    After your procedure(s):  It is not unusual to feel bloated or gassy .  Passing gas and belching is encouraged. Lying on your left side with your knees flexed may relieve the discomfort. A hot pack to the abdomen may also help. If you had an upper endoscopy (EGD), you may experience a slight sore throat which will subside. Throat lozenges or salt water gargle can be used.    FOLLOW-UP:  Contact the office at [phone] if a follow-up appointment is needed or if you develop any of the following:    Severe abdominal pain/discomfort   Excessive bleeding  Black tarry stool  Difficulty breathing/swallowing  Persistent nausea/vomiting    Fever above 100 degrees or chills

## 2024-05-22 NOTE — ANESTHESIA POSTPROCEDURE EVALUATION
Patient: Lennie Richards    Procedure Summary       Date: 05/22/24 Room / Location: [facility] ENDOSCOPY 01 / [facility] ENDOSCOPY    Anesthesia Start: 1340 Anesthesia Stop:     Procedure: ENDOSCOPIC RETROGRADE CHOLANGIOPANCREATOGRAPHY (ERCP) Diagnosis: (choledocholithiasis)    Surgeons: Joanna Wan MD Anesthesiologist: Afshan Mcleod CRNA    Anesthesia Type: general ASA Status: 2            Anesthesia Type: general    Vitals Value Taken Time   /71 05/22/24 1422   Temp 98 05/22/24 1428   Pulse 73 05/22/24 1427   Resp 16 05/22/24 1426   SpO2 89 % 05/22/24 1427   Vitals shown include unfiled device data.    [facility] AN Post Evaluation:   Patient Evaluated in PACU  Patient Participation: complete - patient participated  Level of Consciousness: awake  Pain Score: 0  Pain Management: adequate  Airway Patency:patent  Yes    Cardiovascular Status: acceptable  Respiratory Status: acceptable  Postoperative Hydration acceptable      Afshan Mcleod CRNA  5/22/2024 2:28 PM

## 2024-05-22 NOTE — CONSULTS
[facility]  part of [facility]    Report of Consultation    Lennie Richards Patient Status:  Inpatient    1955 MRN [id number]   Location [facility] 4W/SW/SE Attending Jaclyn Saunders MD   Hosp Day # 1 ZOEY Castellon DO     Date of Admission:  2024  Date of Consult:  2024    Reason for Consultation:  Choledocholithiasis         History of Present Illness:  Lennie Richards   is a 68 year old    female who presents    Ritu Richards is a 68 year old  female  Patient presents with with MRCP which reveals therefore sent to the emergency room for further evaluation.   choledocholithiasis.  Patient had no abdominal pain  Patient was found to have elevated liver enzymes again.  Patient was admitted for further evaluation and treatment.       The patient complains of pain in the epigastric pain , which radiates to the back.  The pain  is postprandial in nature. The patient does complain of fatty food intolerance.  The patient's symptoms developed approximately several years ago . The patient's last attack was 2 weeks ago . The patient denies any history of pancreatitis or jaundice in the past.  The patient denies a history of hepatitis.      Patient had violetta bso in the past      Laboratory data on 2024: CBC normal, lipase normal, CMP  ALT  10 - 49 U/L 280 High    AST  <=34 U/L 88 High    Alkaline Phosphatase  55 - 142 U/L 127   Bilirubin, Total  0.2 - 1.1 mg/dL 0.6         Laboratory data on 5/10/2024:pending,        Ultrasound of the right upper quadrant 2023:  CONCLUSION:   1. Extensive cholelithiasis throughout the gallbladder lumen without sonographic evidence of acute cholecystitis.     2. Negative for hepatobiliary dilatation.     3. Sonographic features of underlying hepatic steatosis.     4. Lesser incidental findings as above.            Ultrasound of the liver 2024:     Impression     CONCLUSION:    Hepatic steatosis.  3.8 cm  peripheral hypoechoic area in the liver adjacent to the portal vein suspected reflect focal fatty sparing.  However, this was not clearly visualized on the prior examination June 2023 and follow-up nonemergent CT abdomen pelvis   with contrast can be performed for further evaluation.    Cholelithiasis.  Positive nonspecific sonographic Rizo sign.  No gallbladder wall thickening or pericholecystic fluid.               CT scan of the abdomen pelvis 4/30/2024:  CT ABDOMEN+PELVIS(CONTRAST ONLY)(CPT=74177)  Order: [id number]  Impression     CONCLUSION:  1. Mild hepatic steatosis.  No suspicious hepatic lesion.  The findings on the ultrasound correlate with focal fatty sparing.  2. Bilateral soft tissue densities in the deep pelvis.  These could be atypically posterior remnants of ovarian tissue.  Other consideration which is less likely is iliac chain lymphadenopathy.  Because the posterior position, these may be difficult to  see with ultrasound.  Ultrasound could be attempted, and if unsuccessful a pelvic MRI could be obtained for attempt at further characterization.  3. Post hysterectomy  4. Colonic diverticulosis.  5. Small hiatal hernia.               Past Medical History:   Diagnosis Date    Acne rosacea      Degenerative joint disease (DJD) of lumbar spine 2011     xray lumbar 12/11, MRI lumbar 1/12-Dr Gant-DJD january L4-5; lumbar injections x2 in 2017 Dr Tello for R sciatica    Depression 2005     treated by gyn after hysterectomy    DJD (degenerative joint disease), cervical 2009     cervical spine xray -9/09-DJD and DDD    Hyperlipidemia 2015    Hypertension, essential 2018    Kidney stone 2005     passed stone in 2005, episode prior to 2005, had cysto to remove stone.     Left knee DJD 2018     Dr Ledesma gel injection    Screening for heart disease 2019     CT heart calcium score 9/9/19 result 16.2.    Sensorineural hearing loss 2018     audiologist Ritu Cazares            Past Surgical History:   Procedure  Laterality Date    COLONOSCOPY   2018     adenoma and several hp    FOOT SURGERY   2009     bunionectomy    HYSTERECTOMY   2005     hyst and bso-done in [place] for bleeding fibroids    LASER SURGERY OF EYE Bilateral 2021     Dr Brandon for glaucoma both eyes.    REPAIR ROTATOR CUFF,ACUTE Left 2016     Dr Baltazar Rose [Sulfametho*    RASH         Current Outpatient Medications   Medication Sig Dispense Refill    amLODIPine 5 MG Oral Tab          Phentermine HCl 15 MG Oral Cap          Omeprazole 40 MG Oral Capsule Delayed Release Take 40 mg by mouth 2 (two) times daily.        escitalopram 20 MG Oral Tab Take 1 tablet (20 mg total) by mouth daily. 90 tablet 3    DHA-EPA-Coenzyme Q10-Vitamin E (COQ-10 & FISH OIL OR) Take 1 tablet by mouth daily.        Glucosamine-Chondroitin (GLUCOSAMINE CHONDR COMPLEX OR) Take 1 tablet by mouth daily.        VIVELLE-DOT 0.05 MG/24HR Transdermal Patch Biweekly Place 1 patch onto the skin once a week.     3    Multiple Vitamin Oral Tab Take 1 tablet by mouth daily.        Cholecalciferol (VITAMIN D) 1000 UNITS Oral Tab Take  by mouth.        Calcium Carbonate-Vitamin D (OSCAL-500) 500-200 MG-UNIT Oral Tab Take 1 tablet by mouth daily.                Family History   Problem Relation Age of Onset    Cancer Father 69          age 69 lung cancer (smoker)    Heart Disorder Mother 79          age 79 after thoracic aortic aneurysm    Hypertension Mother      Other (leukemia) Mother 70         myelolymphocytic leukemia    Alcohol and Other Disorders Associated Brother           cirrhosis    Hypertension Brother      Other (no children) Other      Stroke Maternal Grandmother            age 56      Social History    Tobacco Use      Smoking status: Former        Years: 5        Types: Cigarettes      Smokeless tobacco: Never    Vaping Use      Vaping Use: Never used    Alcohol use: Yes      Comment: 2 DRINKS A DAY    Drug use: No           ROS:  GENERAL HEALTH:  otherwise feels well, no weight loss, no fever or chills  SKIN: denies any unusual skin rashes or jaundice  HEENT: denies nasal congestion, sinus pain or sore throat; hearing loss negative  RESPIRATORY: denies shortness of breath, wheezing or cough   CARDIOVASCULAR: denies chest pain or OLSON; no palpitations   GI: denies nausea, vomiting, constipation, diarrhea; no rectal bleeding; no heartburn  GENITAL/: no dysuria, urgency or frequency, no tea colored urine  MUSCULOSKELETAL: no joint complaints upper or lower extremities  HEMATOLOGY: denies hx anemia; denies bruising or excessive bleeding     EXAM:  GENERAL: well developed, well nourished female, in no apparent distress  SKIN: anicteric  EYES: PERRLA, EOMI, sclera anicteric  HEENT: normocephalic; normal nose, there is no supraclavicular adenopathy present  NECK: supple, no JVD  RESPIRATORY: clear to percussion and auscultation, equal chest wall excursions  CARDIOVASCULAR: RRR, good peripheral perfusion  ABDOMEN: normal active BS, soft, non distended, nontender; there is no hepatosplenomegaly present, no palpable discrete masses present, no RUQ/epigastric tenderness present.  Low transverse scar  LYMPHATIC: no lymphadenopathy  EXTREMITIES: no cyanosis, clubbing or edema        Imp: Acute cholelithiasis and choledocholithiasis  Rec: This is a  68 year old female with evidence for symptomatic cholelithiasis and cholecystitis.   MRCP results revealed extensive choledocholithiasis.  Patient will be admitted for IV antibiotics.  Patient to have ERCP today.    If patient continues to improve and ERCP is successful we will plan cholecystectomy.    Due to the elevated liver enzymes patient will require possible core liver biopsy  I discussed the options of continued observation versus oral dissolution versus lithotripsy versus surgical management.  Open versus laparoscopic versus robotic cholecystectomy were discussed in detail.  The risks of the  procedure, including  bleeding, infections, postoperative hematoma, wound infection, non healing wound, scar formation, perforated hollow viscus, vascular injury, need to convert to an open procedure, bile duct injury, bile leak, retained common bile duct stones, need for a common bile duct exploration; as well as the risks of anesthesia including myocardial infarction, respiratory failure, renal failure, stroke, pulmonary embolism, deep vein thrombosis, and even death were discussed with the patient  who understands, consents, and wishes to proceed with surgery.    The patient will undergo robotic cholecystectomy with possible cholangiogram, possible open  Possible core liver biopsy, under general anesthetic at [facility] on Thursday, May 23, 2024.      I spent 60  minutes on this patient visit. This included: preparing to see patient, obtaining history, reviewing separately obtained history, performing physical examination, independently interpreting results and discussing with patient, patient and family counseling, referring and communicating with other healthcare professionals, and care coordination     The patient was given the booklet about gallbladder surgery.  Thanks for referring the patient.    Lesa Castellon MD MICHAEL MARTIRANO JR. MD. [facility]  GENERAL SURGERY  [facility]    5/22/2024  8:06 AM

## 2024-05-22 NOTE — PROGRESS NOTES
Went into patients room to stop IV from beeping and noted that arm was swollen at the IV site. PIV removed and arm elevated. Patient had 0.9 NS and Zosyn infusing. Dr Tan made aware.

## 2024-05-22 NOTE — ANESTHESIA PROCEDURE NOTES
Airway  Date/Time: 5/22/2024 1:46 PM  Urgency: elective    Airway not difficult    General Information and Staff    Patient location during procedure: OR  Resident/CRNA: Afshan Mcleod CRNA  Performed: CRNA   Performed by: Afshan Mcleod CRNA  Authorized by: Afshan Mcleod CRNA      Indications and Patient Condition  Indications for airway management: anesthesia  Spontaneous Ventilation: absent  Sedation level: deep  Preoxygenated: yes  Patient position: sniffing  Mask difficulty assessment: 0 - not attempted  No planned trial extubation    Final Airway Details  Final airway type: endotracheal airway      Successful airway: ETT  Cuffed: yes   Successful intubation technique: Video laryngoscopy  Facilitating devices/methods: cricoid pressure, intubating stylet and rapid sequence intubation  Blade type: drake.  Blade size: #3  ETT size (mm): 7.0    Cormack-Lehane Classification: grade I - full view of glottis  Placement verified by: capnometry   Measured from: lips  ETT to lips (cm): 20  Number of attempts at approach: 1    Additional Comments  Smooth RSI induction of anesthesia and smooth intubation. All VSS

## 2024-05-22 NOTE — INTERVAL H&P NOTE
Pre-op Diagnosis: none given    The above referenced H&P was reviewed by me on 5/22/2024, the patient was examined and no significant changes have occurred in the patient's condition since the H&P was performed.  I discussed with the patient and/or legal representative the potential benefits, risks and side effects of this procedure; the likelihood of the patient achieving goals; and potential problems that might occur during recuperation.  I discussed reasonable alternatives to the procedure, including risks, benefits and side effects related to the alternatives and risks related to not receiving this procedure.  We will proceed with procedure as planned.

## 2024-05-22 NOTE — PLAN OF CARE
Patient alert and oriented X4, vitals stable. Denies pain. Continues with IVFs and Zosyn. NPO maintained this am for ERCP. Patient sent for procedure and returned stable. Clear liquid diet and to be NPO after midnight for surgical procedure tomorrow. Consent signed and on chart. Patient voiding freely. Bed locked and in lowest position, call light within reach, has been calling appropriately for assistance.  Problem: PAIN - ADULT  Goal: Verbalizes/displays adequate comfort level or patient's stated pain goal  Description: INTERVENTIONS:  - Encourage pt to monitor pain and request assistance  - Assess pain using appropriate pain scale  - Administer analgesics based on type and severity of pain and evaluate response  - Implement non-pharmacological measures as appropriate and evaluate response  - Consider cultural and social influences on pain and pain management  - Manage/alleviate anxiety  - Utilize distraction and/or relaxation techniques  - Monitor for opioid side effects  - Notify MD/LIP if interventions unsuccessful or patient reports new pain  - Anticipate increased pain with activity and pre-medicate as appropriate  Outcome: Progressing     Problem: RISK FOR INFECTION - ADULT  Goal: Absence of fever/infection during anticipated neutropenic period  Description: INTERVENTIONS  - Monitor WBC  - Administer growth factors as ordered  - Implement neutropenic guidelines  Outcome: Progressing     Problem: SAFETY ADULT - FALL  Goal: Free from fall injury  Description: INTERVENTIONS:  - Assess pt frequently for physical needs  - Identify cognitive and physical deficits and behaviors that affect risk of falls.  - [place] fall precautions as indicated by assessment.  - Educate pt/family on patient safety including physical limitations  - Instruct pt to call for assistance with activity based on assessment  - Modify environment to reduce risk of injury  - Provide assistive devices as appropriate  - Consider OT/PT consult  to assist with strengthening/mobility  - Encourage toileting schedule  Outcome: Progressing     Problem: DISCHARGE PLANNING  Goal: Discharge to home or other facility with appropriate resources  Description: INTERVENTIONS:  - Identify barriers to discharge w/pt and caregiver  - Include patient/family/discharge partner in discharge planning  - Arrange for needed discharge resources and transportation as appropriate  - Identify discharge learning needs (meds, wound care, etc)  - Arrange for interpreters to assist at discharge as needed  - Consider post-discharge preferences of patient/family/discharge partner  - Complete POLST form as appropriate  - Assess patient's ability to be responsible for managing their own health  - Refer to Case Management Department for coordinating discharge planning if the patient needs post-hospital services based on physician/LIP order or complex needs related to functional status, cognitive ability or social support system  Outcome: Progressing

## 2024-05-22 NOTE — PROGRESS NOTES
[facility]  part of [facility]    Progress Note    Lennie Richards Patient Status:  Inpatient    1955 MRN [id number]   Location [facility] 4W/SW/SE Attending Rose Tan MD   Hosp Day # 1 PCP Lesa Castellon DO       Subjective:   Lennie Richards is feeling well. No abdominal pain. No nausea.     Objective:   Blood pressure 128/66, pulse 54, temperature 98 °F (36.7 °C), temperature source Oral, resp. rate 16, height 5' 6\" (1.676 m), weight 185 lb (83.9 kg), SpO2 94%, not currently breastfeeding.    Physical Exam:    General: No acute distress.   Respiratory: Clear to auscultation bilaterally. No wheezes. No rhonchi.  Cardiovascular: S1, S2. Regular rate and rhythm. No murmurs, rubs or gallops.   Abdomen: Soft, nontender, nondistended.  Positive bowel sounds. No rebound or guarding.  Neurologic: No focal neurological deficits.   Musculoskeletal: Moves all extremities.  Extremities: No edema.    Results:     Lab Results   Component Value Date    WBC 4.4 2024    HGB 13.1 2024    HCT 37.7 2024    .0 2024    CREATSERUM 0.80 2024    BUN 9 2024     2024    K 3.5 2024     2024    CO2 28.0 2024     (H) 2024    CA 8.9 2024    ALB 3.7 2024    ALKPHO 96 2024    BILT 1.2 (H) 2024    TP 6.0 2024     (H) 2024     (H) 2024    INR 0.99 2024    TSH 2.500 2023    PB 43 2024    LIP 31 2024    CRP <0.29 2023    MG 2.0 2024    PHOS 3.0 2024    TROP 0.00 2017       MRI ABDOMEN AND MRCP W/3D (CPT=74181/00100)    Result Date: 2024  CONCLUSION:   Severe cholelithiasis without MRI evidence of acute cholecystitis.  Choledocholithiasis with dilated common bile duct.  No intrahepatic biliary ductal dilation.  7 mm pancreatic tail IPMN.  Follow-up MRCP suggested in 12 months to demonstrate  stability.    Dictated by (CST): Jose Luis Hollingsworth MD on 5/20/2024 at 8:07 PM     Finalized by (CST): Jose Luis Hollingsworth MD on 5/20/2024 at 8:11 PM              [START ON 5/23/2024] indocyanine green  5 mg Intravenous Once    famotidine  20 mg Intravenous Q12H    piperacillin-tazobactam  3.375 g Intravenous Q8H    heparin  5,000 Units Subcutaneous Q8H LURDES    amLODIPine  5 mg Oral Nightly    escitalopram  20 mg Oral Nightly       acetaminophen    acetaminophen **OR** HYDROcodone-acetaminophen **OR** HYDROcodone-acetaminophen    morphINE **OR** morphINE **OR** morphINE    ondansetron      Assessment and Plan:     Choledocholithiasis  -Patient presenting with right upper quadrant pain  -MRCP performed as outpatient noted severe cholelithiasis and choledocholithiasis with dilated common bile duct.  Did not note evidence of acute cholecystitis.  -LFT elevations noted, continue trending  -Continue on IV fluids and empiric IV antibiotics  -Pain control as able, close monitoring with narcotics  -GI consulted, awaiting recommendations   - ERCP done today 5/22- status post sphincterotomy and balloon sweep with complete clearance   - patient will also need cholecystectomy and core liver biopsy by Surgery- most likely Thursday 5/23  -General surgery on consult, appreciate recommendations regarding timing of cholecystectomy.     HTN  - controlled  - CPM  - Monitor and adjust as needed     Hyperlipidemia  -Continue statin           Quality:  DVT Prophylaxis: Heparin  CODE status: Full    MDM.high WING PORTILLO MD  05/22/24

## 2024-05-23 ENCOUNTER — ANESTHESIA (OUTPATIENT)
Dept: SURGERY | Facility: HOSPITAL | Age: 69
DRG: 419 | End: 2024-05-23

## 2024-05-23 ENCOUNTER — APPOINTMENT (OUTPATIENT)
Dept: GENERAL RADIOLOGY | Facility: HOSPITAL | Age: 69
DRG: 419 | End: 2024-05-23
Attending: SURGERY

## 2024-05-23 ENCOUNTER — ANESTHESIA EVENT (OUTPATIENT)
Dept: SURGERY | Facility: HOSPITAL | Age: 69
DRG: 419 | End: 2024-05-23

## 2024-05-23 LAB
ALBUMIN SERPL-MCNC: 3.9 G/DL (ref 3.2–4.8)
ALBUMIN/GLOB SERPL: 1.6 {RATIO} (ref 1–2)
ALP LIVER SERPL-CCNC: 101 U/L
ALT SERPL-CCNC: 219 U/L
AMYLASE SERPL-CCNC: 75 U/L (ref 30–118)
ANION GAP SERPL CALC-SCNC: 7 MMOL/L (ref 0–18)
AST SERPL-CCNC: 96 U/L (ref ?–34)
BASOPHILS # BLD AUTO: 0.02 X10(3) UL (ref 0–0.2)
BASOPHILS NFR BLD AUTO: 0.3 %
BILIRUB SERPL-MCNC: 0.6 MG/DL (ref 0.2–1.1)
BUN BLD-MCNC: 9 MG/DL (ref 9–23)
BUN/CREAT SERPL: 11.1 (ref 10–20)
CALCIUM BLD-MCNC: 8.7 MG/DL (ref 8.7–10.4)
CHLORIDE SERPL-SCNC: 111 MMOL/L (ref 98–112)
CO2 SERPL-SCNC: 26 MMOL/L (ref 21–32)
CREAT BLD-MCNC: 0.81 MG/DL
DEPRECATED RDW RBC AUTO: 45.1 FL (ref 35.1–46.3)
EGFRCR SERPLBLD CKD-EPI 2021: 78 ML/MIN/1.73M2 (ref 60–?)
EOSINOPHIL # BLD AUTO: 0 X10(3) UL (ref 0–0.7)
EOSINOPHIL NFR BLD AUTO: 0 %
ERYTHROCYTE [DISTWIDTH] IN BLOOD BY AUTOMATED COUNT: 12.9 % (ref 11–15)
GLOBULIN PLAS-MCNC: 2.5 G/DL (ref 2–3.5)
GLUCOSE BLD-MCNC: 107 MG/DL (ref 70–99)
HCT VFR BLD AUTO: 39.1 %
HGB BLD-MCNC: 13.1 G/DL
IMM GRANULOCYTES # BLD AUTO: 0.02 X10(3) UL (ref 0–1)
IMM GRANULOCYTES NFR BLD: 0.3 %
LIPASE SERPL-CCNC: 33 U/L (ref 12–53)
LYMPHOCYTES # BLD AUTO: 0.93 X10(3) UL (ref 1–4)
LYMPHOCYTES NFR BLD AUTO: 13.6 %
MAGNESIUM SERPL-MCNC: 2 MG/DL (ref 1.6–2.6)
MCH RBC QN AUTO: 32 PG (ref 26–34)
MCHC RBC AUTO-ENTMCNC: 33.5 G/DL (ref 31–37)
MCV RBC AUTO: 95.6 FL
MONOCYTES # BLD AUTO: 0.4 X10(3) UL (ref 0.1–1)
MONOCYTES NFR BLD AUTO: 5.9 %
NEUTROPHILS # BLD AUTO: 5.46 X10 (3) UL (ref 1.5–7.7)
NEUTROPHILS # BLD AUTO: 5.46 X10(3) UL (ref 1.5–7.7)
NEUTROPHILS NFR BLD AUTO: 79.9 %
OSMOLALITY SERPL CALC.SUM OF ELEC: 297 MOSM/KG (ref 275–295)
PHOSPHATE SERPL-MCNC: 3.4 MG/DL (ref 2.4–5.1)
PLATELET # BLD AUTO: 198 10(3)UL (ref 150–450)
POTASSIUM SERPL-SCNC: 3.7 MMOL/L (ref 3.5–5.1)
PROT SERPL-MCNC: 6.4 G/DL (ref 5.7–8.2)
RBC # BLD AUTO: 4.09 X10(6)UL
SODIUM SERPL-SCNC: 144 MMOL/L (ref 136–145)
WBC # BLD AUTO: 6.8 X10(3) UL (ref 4–11)

## 2024-05-23 PROCEDURE — 74300 X-RAY BILE DUCTS/PANCREAS: CPT | Performed by: SURGERY

## 2024-05-23 PROCEDURE — 0FB04ZX EXCISION OF LIVER, PERCUTANEOUS ENDOSCOPIC APPROACH, DIAGNOSTIC: ICD-10-PCS | Performed by: SURGERY

## 2024-05-23 PROCEDURE — 0FT44ZZ RESECTION OF GALLBLADDER, PERCUTANEOUS ENDOSCOPIC APPROACH: ICD-10-PCS | Performed by: SURGERY

## 2024-05-23 PROCEDURE — 8E0W4CZ ROBOTIC ASSISTED PROCEDURE OF TRUNK REGION, PERCUTANEOUS ENDOSCOPIC APPROACH: ICD-10-PCS | Performed by: SURGERY

## 2024-05-23 PROCEDURE — 99233 SBSQ HOSP IP/OBS HIGH 50: CPT | Performed by: HOSPITALIST

## 2024-05-23 RX ORDER — GLYCOPYRROLATE 0.2 MG/ML
INJECTION, SOLUTION INTRAMUSCULAR; INTRAVENOUS AS NEEDED
Status: DISCONTINUED | OUTPATIENT
Start: 2024-05-23 | End: 2024-05-23 | Stop reason: SURG

## 2024-05-23 RX ORDER — MORPHINE SULFATE 10 MG/ML
6 INJECTION, SOLUTION INTRAMUSCULAR; INTRAVENOUS EVERY 10 MIN PRN
Status: DISCONTINUED | OUTPATIENT
Start: 2024-05-23 | End: 2024-05-23 | Stop reason: HOSPADM

## 2024-05-23 RX ORDER — HYDROCODONE BITARTRATE AND ACETAMINOPHEN 10; 325 MG/1; MG/1
2 TABLET ORAL EVERY 6 HOURS PRN
Status: DISCONTINUED | OUTPATIENT
Start: 2024-05-23 | End: 2024-05-24

## 2024-05-23 RX ORDER — HEPARIN SODIUM 5000 [USP'U]/ML
5000 INJECTION, SOLUTION INTRAVENOUS; SUBCUTANEOUS EVERY 8 HOURS SCHEDULED
Status: DISCONTINUED | OUTPATIENT
Start: 2024-05-24 | End: 2024-05-24

## 2024-05-23 RX ORDER — HYDROCODONE BITARTRATE AND ACETAMINOPHEN 10; 325 MG/1; MG/1
2 TABLET ORAL ONCE AS NEEDED
Status: DISCONTINUED | OUTPATIENT
Start: 2024-05-23 | End: 2024-05-23 | Stop reason: HOSPADM

## 2024-05-23 RX ORDER — HYDROMORPHONE HYDROCHLORIDE 1 MG/ML
INJECTION, SOLUTION INTRAMUSCULAR; INTRAVENOUS; SUBCUTANEOUS AS NEEDED
Status: DISCONTINUED | OUTPATIENT
Start: 2024-05-23 | End: 2024-05-23 | Stop reason: SURG

## 2024-05-23 RX ORDER — HYDROCODONE BITARTRATE AND ACETAMINOPHEN 10; 325 MG/1; MG/1
1 TABLET ORAL ONCE AS NEEDED
Status: DISCONTINUED | OUTPATIENT
Start: 2024-05-23 | End: 2024-05-23 | Stop reason: HOSPADM

## 2024-05-23 RX ORDER — SCOLOPAMINE TRANSDERMAL SYSTEM 1 MG/1
1 PATCH, EXTENDED RELEASE TRANSDERMAL ONCE
Status: DISCONTINUED | OUTPATIENT
Start: 2024-05-23 | End: 2024-05-23

## 2024-05-23 RX ORDER — MIDAZOLAM HYDROCHLORIDE 1 MG/ML
INJECTION INTRAMUSCULAR; INTRAVENOUS AS NEEDED
Status: DISCONTINUED | OUTPATIENT
Start: 2024-05-23 | End: 2024-05-23 | Stop reason: SURG

## 2024-05-23 RX ORDER — NAPROXEN 500 MG/1
500 TABLET ORAL 2 TIMES DAILY WITH MEALS
Status: DISCONTINUED | OUTPATIENT
Start: 2024-05-23 | End: 2024-05-24

## 2024-05-23 RX ORDER — ROCURONIUM BROMIDE 10 MG/ML
INJECTION, SOLUTION INTRAVENOUS AS NEEDED
Status: DISCONTINUED | OUTPATIENT
Start: 2024-05-23 | End: 2024-05-23 | Stop reason: SURG

## 2024-05-23 RX ORDER — IOPAMIDOL 612 MG/ML
INJECTION, SOLUTION INTRATHECAL AS NEEDED
Status: DISCONTINUED | OUTPATIENT
Start: 2024-05-23 | End: 2024-05-23 | Stop reason: HOSPADM

## 2024-05-23 RX ORDER — ONDANSETRON 2 MG/ML
8 INJECTION INTRAMUSCULAR; INTRAVENOUS EVERY 6 HOURS PRN
Status: DISCONTINUED | OUTPATIENT
Start: 2024-05-23 | End: 2024-05-24

## 2024-05-23 RX ORDER — MORPHINE SULFATE 4 MG/ML
4 INJECTION, SOLUTION INTRAMUSCULAR; INTRAVENOUS EVERY 10 MIN PRN
Status: DISCONTINUED | OUTPATIENT
Start: 2024-05-23 | End: 2024-05-23 | Stop reason: HOSPADM

## 2024-05-23 RX ORDER — DEXAMETHASONE SODIUM PHOSPHATE 4 MG/ML
VIAL (ML) INJECTION AS NEEDED
Status: DISCONTINUED | OUTPATIENT
Start: 2024-05-23 | End: 2024-05-23 | Stop reason: SURG

## 2024-05-23 RX ORDER — HYDROCODONE BITARTRATE AND ACETAMINOPHEN 5; 325 MG/1; MG/1
1 TABLET ORAL EVERY 4 HOURS PRN
Qty: 10 TABLET | Refills: 0 | Status: SHIPPED | OUTPATIENT
Start: 2024-05-23

## 2024-05-23 RX ORDER — ACETAMINOPHEN 500 MG
500 TABLET ORAL EVERY 6 HOURS PRN
Status: DISCONTINUED | OUTPATIENT
Start: 2024-05-23 | End: 2024-05-24

## 2024-05-23 RX ORDER — ONDANSETRON 2 MG/ML
4 INJECTION INTRAMUSCULAR; INTRAVENOUS EVERY 6 HOURS PRN
Status: DISCONTINUED | OUTPATIENT
Start: 2024-05-23 | End: 2024-05-23 | Stop reason: HOSPADM

## 2024-05-23 RX ORDER — BUPIVACAINE HYDROCHLORIDE AND EPINEPHRINE 2.5; 5 MG/ML; UG/ML
INJECTION, SOLUTION INFILTRATION; PERINEURAL AS NEEDED
Status: DISCONTINUED | OUTPATIENT
Start: 2024-05-23 | End: 2024-05-23 | Stop reason: HOSPADM

## 2024-05-23 RX ORDER — HYDROMORPHONE HYDROCHLORIDE 1 MG/ML
0.4 INJECTION, SOLUTION INTRAMUSCULAR; INTRAVENOUS; SUBCUTANEOUS EVERY 5 MIN PRN
Status: DISCONTINUED | OUTPATIENT
Start: 2024-05-23 | End: 2024-05-23 | Stop reason: HOSPADM

## 2024-05-23 RX ORDER — ACETAMINOPHEN 10 MG/ML
INJECTION, SOLUTION INTRAVENOUS AS NEEDED
Status: DISCONTINUED | OUTPATIENT
Start: 2024-05-23 | End: 2024-05-23 | Stop reason: SURG

## 2024-05-23 RX ORDER — SODIUM CHLORIDE, SODIUM LACTATE, POTASSIUM CHLORIDE, CALCIUM CHLORIDE 600; 310; 30; 20 MG/100ML; MG/100ML; MG/100ML; MG/100ML
INJECTION, SOLUTION INTRAVENOUS CONTINUOUS
Status: DISCONTINUED | OUTPATIENT
Start: 2024-05-23 | End: 2024-05-23 | Stop reason: HOSPADM

## 2024-05-23 RX ORDER — NALOXONE HYDROCHLORIDE 0.4 MG/ML
0.08 INJECTION, SOLUTION INTRAMUSCULAR; INTRAVENOUS; SUBCUTANEOUS AS NEEDED
Status: DISCONTINUED | OUTPATIENT
Start: 2024-05-23 | End: 2024-05-23 | Stop reason: HOSPADM

## 2024-05-23 RX ORDER — DOCUSATE SODIUM 100 MG/1
100 CAPSULE, LIQUID FILLED ORAL 2 TIMES DAILY
Qty: 14 CAPSULE | Refills: 0 | Status: SHIPPED | OUTPATIENT
Start: 2024-05-23 | End: 2024-05-30

## 2024-05-23 RX ORDER — HYDROMORPHONE HYDROCHLORIDE 1 MG/ML
0.6 INJECTION, SOLUTION INTRAMUSCULAR; INTRAVENOUS; SUBCUTANEOUS EVERY 5 MIN PRN
Status: DISCONTINUED | OUTPATIENT
Start: 2024-05-23 | End: 2024-05-23 | Stop reason: HOSPADM

## 2024-05-23 RX ORDER — METOCLOPRAMIDE HYDROCHLORIDE 5 MG/ML
10 INJECTION INTRAMUSCULAR; INTRAVENOUS EVERY 8 HOURS PRN
Status: DISCONTINUED | OUTPATIENT
Start: 2024-05-23 | End: 2024-05-23 | Stop reason: HOSPADM

## 2024-05-23 RX ORDER — ACETAMINOPHEN 500 MG
1000 TABLET ORAL ONCE AS NEEDED
Status: DISCONTINUED | OUTPATIENT
Start: 2024-05-23 | End: 2024-05-23 | Stop reason: HOSPADM

## 2024-05-23 RX ORDER — HYDROCODONE BITARTRATE AND ACETAMINOPHEN 10; 325 MG/1; MG/1
1 TABLET ORAL EVERY 6 HOURS PRN
Status: DISCONTINUED | OUTPATIENT
Start: 2024-05-23 | End: 2024-05-24

## 2024-05-23 RX ORDER — HYDROMORPHONE HYDROCHLORIDE 1 MG/ML
0.2 INJECTION, SOLUTION INTRAMUSCULAR; INTRAVENOUS; SUBCUTANEOUS EVERY 5 MIN PRN
Status: DISCONTINUED | OUTPATIENT
Start: 2024-05-23 | End: 2024-05-23 | Stop reason: HOSPADM

## 2024-05-23 RX ORDER — MORPHINE SULFATE 4 MG/ML
2 INJECTION, SOLUTION INTRAMUSCULAR; INTRAVENOUS EVERY 10 MIN PRN
Status: DISCONTINUED | OUTPATIENT
Start: 2024-05-23 | End: 2024-05-23 | Stop reason: HOSPADM

## 2024-05-23 RX ORDER — LIDOCAINE HYDROCHLORIDE 10 MG/ML
INJECTION, SOLUTION EPIDURAL; INFILTRATION; INTRACAUDAL; PERINEURAL AS NEEDED
Status: DISCONTINUED | OUTPATIENT
Start: 2024-05-23 | End: 2024-05-23 | Stop reason: SURG

## 2024-05-23 RX ADMIN — ROCURONIUM BROMIDE 40 MG: 10 INJECTION, SOLUTION INTRAVENOUS at 14:55:00

## 2024-05-23 RX ADMIN — HYDROMORPHONE HYDROCHLORIDE 0.5 MG: 1 INJECTION, SOLUTION INTRAMUSCULAR; INTRAVENOUS; SUBCUTANEOUS at 15:26:00

## 2024-05-23 RX ADMIN — GLYCOPYRROLATE 0.2 MG: 0.2 INJECTION, SOLUTION INTRAMUSCULAR; INTRAVENOUS at 14:37:00

## 2024-05-23 RX ADMIN — SODIUM CHLORIDE, SODIUM LACTATE, POTASSIUM CHLORIDE, CALCIUM CHLORIDE: 600; 310; 30; 20 INJECTION, SOLUTION INTRAVENOUS at 14:37:00

## 2024-05-23 RX ADMIN — DEXAMETHASONE SODIUM PHOSPHATE 4 MG: 4 MG/ML VIAL (ML) INJECTION at 15:04:00

## 2024-05-23 RX ADMIN — ROCURONIUM BROMIDE 10 MG: 10 INJECTION, SOLUTION INTRAVENOUS at 14:37:00

## 2024-05-23 RX ADMIN — MIDAZOLAM HYDROCHLORIDE 2 MG: 1 INJECTION INTRAMUSCULAR; INTRAVENOUS at 14:37:00

## 2024-05-23 RX ADMIN — ACETAMINOPHEN 1000 MG: 10 INJECTION, SOLUTION INTRAVENOUS at 16:34:00

## 2024-05-23 RX ADMIN — LIDOCAINE HYDROCHLORIDE 50 MG: 10 INJECTION, SOLUTION EPIDURAL; INFILTRATION; INTRACAUDAL; PERINEURAL at 14:37:00

## 2024-05-23 RX ADMIN — ONDANSETRON 4 MG: 2 INJECTION INTRAMUSCULAR; INTRAVENOUS at 15:04:00

## 2024-05-23 NOTE — ANESTHESIA POSTPROCEDURE EVALUATION
Patient: Lennie Richards    Procedure Summary       Date: 05/23/24 Room / Location: [facility] MAIN OR 07 / [facility] MAIN OR    Anesthesia Start: 1425 Anesthesia Stop: 1650    Procedure: Robotic cholecystectomy with cholangiogram, core liver biopsy (Abdomen) Diagnosis: (Gallstones, elevated liver enzymes)    Surgeons: Go Mock MD Anesthesiologist: Evin Conrad MD    Anesthesia Type: general ASA Status: 2            Anesthesia Type: general    Vitals Value Taken Time   /83 05/23/24 1650   Temp 97.5 05/23/24 1650   Pulse 80 05/23/24 1649   Resp 18 05/23/24 1649   SpO2 86 % 05/23/24 1649   Vitals shown include unfiled device data.    [facility] AN Post Evaluation:   Patient Evaluated in PACU  Patient Participation: complete - patient participated  Level of Consciousness: awake and alert  Pain Score: 0  Pain Management: adequate  Airway Patency:patent  Yes    Nausea/Vomiting: none  Cardiovascular Status: acceptable  Respiratory Status: acceptable  Postoperative Hydration acceptable      Evin Conrad MD  5/23/2024 4:50 PM

## 2024-05-23 NOTE — ANESTHESIA PREPROCEDURE EVALUATION
Anesthesia PreOp Note    HPI:     Lennie Richards is a 68 year old female who presents for preoperative consultation requested by: Go Mock MD    Date of Surgery: 5/21/2024 - 5/23/2024    Procedure(s):  Robotic cholecystectomy with cholangiogram, possible open, possible core liver biopsy  Indication: Gallstones, elevated liver enzymes    Relevant Problems   No relevant active problems       NPO:  Last Liquid Consumption Date: 05/22/24  Last Liquid Consumption Time: 2330  Last Solid Consumption Date: 05/20/24  Last Solid Consumption Time: 2300  Last Liquid Consumption Date: 05/22/24          History Review:  Patient Active Problem List    Diagnosis Date Noted    Choledocholithiasis 05/21/2024    Biliary calculus of other site with obstruction 05/21/2024    Sensorineural hearing loss, bilateral 05/21/2018    Hypertension, essential 04/25/2018    Hypercholesterolemia 04/25/2018    Depression 06/26/2015    DJD (degenerative joint disease) 06/26/2015       Past Medical History:    Acne rosacea    Acute closed-angle glaucoma, right    Calculus of kidney    Degenerative joint disease (DJD) of lumbar spine    xray lumbar 12/11, MRI lumbar 1/12-Dr Gant-DJD january L4-5; lumbar injections x2 in 2017 Dr Tello for R sciatica    Depression    treated by gyn after hysterectomy    DJD (degenerative joint disease), cervical    cervical spine xray -9/09-DJD and DDD    Esophageal reflux    Hearing impairment    NO HEARING AIDS    High blood pressure    High cholesterol    Hyperlipidemia    Hypertension, essential    Kidney stone    passed stone in 2005, episode prior to 2005, had cysto to remove stone.     Left knee DJD    Dr Ledesma gel injection    Osteoarthritis    PONV (postoperative nausea and vomiting)    Screening for heart disease    CT heart calcium score 9/9/19 result 16.2.    Sensorineural hearing loss    audiologist Ritu Cazares    Visual impairment    GLASSES       Past Surgical History:   Procedure  Laterality Date    Colonoscopy  06/2018    Foot surgery  2009    bunionectomy    Glaucoma surgery Bilateral 11/2021    laser     Hysterectomy  2005    hyst and bso-done in [place] for bleeding fibroids    Laser surgery of eye Bilateral 05/2021    Dr Brandon for glaucoma both eyes.    Repair rotator cuff,acute Left 5/2016    Dr Ledesma       Medications Prior to Admission   Medication Sig Dispense Refill Last Dose    Phentermine HCl 30 MG Oral Cap Take 1 capsule (30 mg total) by mouth every morning. 30 capsule 1     estradiol 0.025 MG/24HR Transdermal Patch Biweekly APPLY 1 PATCH TOPICALLY TO THE SKIN 2 TIMES A WEEK 8 patch 11 5/20/2024    OMEPRAZOLE 40 MG Oral Capsule Delayed Release TAKE 1 CAPSULE BY MOUTH EVERY MORNING AND EVERY NIGHT AT BEDTIME (Patient taking differently: Take 1 capsule (40 mg total) by mouth every morning.) 180 capsule 0 5/21/2024    escitalopram 20 MG Oral Tab Take 1 tablet (20 mg total) by mouth daily. (Patient taking differently: Take 1 tablet (20 mg total) by mouth at bedtime.) 90 tablet 3 5/20/2024    amLODIPine 5 MG Oral Tab Take 1 tablet (5 mg total) by mouth daily. (Patient taking differently: Take 1 tablet (5 mg total) by mouth at bedtime.) 90 tablet 3 5/20/2024    LUTEIN OR Take by mouth daily.   Past Week    Bilberry, Vaccinium myrtillus, (BILBERRY OR) Take by mouth daily.   Past Week    DHA-EPA-Coenzyme Q10-Vitamin E (COQ-10 & FISH OIL OR) Take 1 tablet by mouth daily.   Past Week    Glucosamine-Chondroitin (GLUCOSAMINE CHONDR COMPLEX OR) Take 1 tablet by mouth daily.   Past Week    Multiple Vitamin Oral Tab Take 1 tablet by mouth daily.   Past Week    Cholecalciferol (VITAMIN D) 1000 UNITS Oral Tab Take  by mouth.   Past Week    Calcium Carbonate-Vitamin D (OSCAL-500) 500-200 MG-UNIT Oral Tab Take 1 tablet by mouth daily.   Past Week     Current Facility-Administered Medications Ordered in Epic   Medication Dose Route Frequency Provider Last Rate Last Admin    scopolamine  (Transderm-Scop) 1 MG/3DAYS patch 1 patch  1 patch Transdermal Once Susannah Urena MD   1 patch at 05/23/24 1415    [Transfer Hold] calcium carbonate-vitamin D (Oyster Shell-D) 250-3.125 MG-MCG per tab 1 tablet  1 tablet Oral Daily Rose Tan MD        [Transfer Hold] famotidine (Pepcid) 20 mg/2mL injection 20 mg  20 mg Intravenous Q12H Go Mock MD   20 mg at 05/23/24 0601    piperacillin-tazobactam (Zosyn) 3.375 g in dextrose 5% 100 mL IVPB-ADDV  3.375 g Intravenous Q8H Hayden Gaytan MD 25 mL/hr at 05/23/24 1219 3.375 g at 05/23/24 1219    sodium chloride 0.9% infusion   Intravenous Continuous Hayden Gaytan  mL/hr at 05/23/24 1337 New Bag at 05/23/24 1337    [Transfer Hold] heparin (Porcine) 5000 UNIT/ML injection 5,000 Units  5,000 Units Subcutaneous Q8H [facility] Hayden Gaytan MD   5,000 Units at 05/22/24 2208    [Transfer Hold] acetaminophen (Tylenol Extra Strength) tab 500 mg  500 mg Oral Q4H PRN Hayden Gaytan MD        [Transfer Hold] acetaminophen (Tylenol) tab 650 mg  650 mg Oral Q4H PRN Hayden Gaytan MD        Or    [Transfer Hold] HYDROcodone-acetaminophen (Norco) 5-325 MG per tab 1 tablet  1 tablet Oral Q4H PRN Hayden Gaytan MD        Or    [Transfer Hold] HYDROcodone-acetaminophen (Norco) 5-325 MG per tab 2 tablet  2 tablet Oral Q4H PRN Hayden Gaytan MD        [Transfer Hold] morphINE PF 2 MG/ML injection 1 mg  1 mg Intravenous Q2H PRN Hayden Gaytan MD        Or    [Transfer Hold] morphINE PF 2 MG/ML injection 2 mg  2 mg Intravenous Q2H PRN Hayden Gaytan MD   2 mg at 05/21/24 1927    Or    [Transfer Hold] morphINE PF 4 MG/ML injection 4 mg  4 mg Intravenous Q2H PRN Hayden Gaytan MD        [Transfer Hold] ondansetron (Zofran) 4 MG/2ML injection 4 mg  4 mg Intravenous Q6H PRN Hayden Gaytan MD        lactated ringers infusion   Intravenous Continuous Go Mock MD        [Transfer Hold] amLODIPine (Norvasc) tab 5 mg  5  mg Oral Nightly Jaclyn Saunders MD   5 mg at 24    [Transfer Hold] escitalopram (Lexapro) tab 20 mg  20 mg Oral Nightly Jaclyn Saunders MD   20 mg at 24     Current Outpatient Medications Ordered in Epic   Medication Sig Dispense Refill    HYDROcodone-acetaminophen 5-325 MG Oral Tab Take 1 tablet by mouth every 4 (four) hours as needed for Pain. 10 tablet 0    docusate sodium (COLACE) 100 MG Oral Cap Take 1 capsule (100 mg total) by mouth 2 (two) times daily for 7 days. 14 capsule 0       Allergies   Allergen Reactions    Bactrim [Sulfamethoxazole W/Trimethoprim] RASH     ADULT ALLERGY, MANAGED AT HOME. NO CARDIAC/RESPIRATORY FAILURE, NO ORGAN DAMAGE NOTED.       Family History   Problem Relation Age of Onset    Cancer Father 69         age 69 lung cancer (smoker)    Heart Disorder Mother 79         age 79 after thoracic aortic aneurysm    Hypertension Mother     Other (leukemia) Mother 70        myelolymphocytic leukemia    Alcohol and Other Disorders Associated Brother         cirrhosis    Hypertension Brother     Other (no children) Other     Stroke Maternal Grandmother          age 56     Social History     Socioeconomic History    Marital status:    Tobacco Use    Smoking status: Former     Types: Cigarettes    Smokeless tobacco: Never   Vaping Use    Vaping status: Never Used   Substance and Sexual Activity    Alcohol use: Yes     Alcohol/week: 2.0 standard drinks of alcohol     Types: 2 Glasses of wine per week     Comment: 2 DRINKS A DAY    Drug use: No   Other Topics Concern    Caffeine Concern Yes     Comment: Coffee 2 cups daily    Reaction to local anesthetic No       Available pre-op labs reviewed.  Lab Results   Component Value Date    WBC 6.8 2024    RBC 4.09 2024    HGB 13.1 2024    HCT 39.1 2024    MCV 95.6 2024    MCH 32.0 2024    MCHC 33.5 2024    RDW 12.9 2024    .0 2024     Lab Results    Component Value Date     05/23/2024    K 3.7 05/23/2024     05/23/2024    CO2 26.0 05/23/2024    BUN 9 05/23/2024    CREATSERUM 0.81 05/23/2024     (H) 05/23/2024    CA 8.7 05/23/2024     Lab Results   Component Value Date    INR 0.99 05/22/2024       Vital Signs:  Body mass index is 29.86 kg/m².   height is 1.676 m (5' 6\") and weight is 83.9 kg (185 lb). Her oral temperature is 98 °F (36.7 °C). Her blood pressure is 133/73 and her pulse is 61. Her respiration is 16 and oxygen saturation is 95%.   Vitals:    05/22/24 2208 05/23/24 0448 05/23/24 1148 05/23/24 1332   BP:  128/72 141/77 133/73   Pulse:  60 61 61   Resp:  16 17 16   Temp:  98.5 °F (36.9 °C) 97.8 °F (36.6 °C) 98 °F (36.7 °C)   TempSrc:  Oral Oral Oral   SpO2: 97% 94% 95% 95%   Weight:    83.9 kg (185 lb)   Height:    1.676 m (5' 6\")        Anesthesia Evaluation     Patient summary reviewed and Nursing notes reviewed    History of anesthetic complications   Airway   Mallampati: III  TM distance: >3 FB  Neck ROM: full  Dental      Comment: Frontal upper caps    Pulmonary - negative ROS and normal exam   Cardiovascular - normal exam  (+) hypertension    Neuro/Psych - negative ROS     GI/Hepatic/Renal    (+) GERD    Endo/Other    (+) arthritis  Abdominal   (+) obese                 Anesthesia Plan:   ASA:  2  Plan:   General  Airway:  ETT and Video laryngoscope  Post-op Pain Management: Local and IV analgesics  Informed Consent Plan and Risks Discussed With:  Patient  Discussed plan with:  Surgeon      I have informed Lennie Richards and/or legal guardian or family member of the nature of the anesthetic plan, benefits, risks including possible dental damage if relevant, major complications, and any alternative forms of anesthetic management.   All of the patient's questions were answered to the best of my ability. The patient desires the anesthetic management as planned.  CORY MITCHELL MD  5/23/2024 2:03 PM  Present on  Admission:  **None**

## 2024-05-23 NOTE — OPERATIVE REPORT
[facility] OPERATING ROOM OPERATIVE REPORT:     PATIENT NAME: Lennie Richards  : 1955   MRN: [id number]  SITE:  [facility]     DATE OF OPERATION:   2024     PREOPERATIVE DIAGNOSIS: Acute on Chronic Cholecystitis, Cholelithiasis, h/o choledocholithiasis, s/p ERCP history of elevated liver enzymes      POSTOPERATIVE DIAGNOSIS: Acute on Chronic Cholecystitis, Cholelithiasis, h/o choledocholithiasis, s/p ERCP history of elevated liver enzymes     PROCEDURE PERFORMED: Robotic cholecystectomy with intraoperative cholangiogram and robotic core liver biopsy and with immunofluorescence cholangiography     SURGEON: Go Mock MD    ASST:  EVELYN Zhou (Assistant helped position patient and helped with positioning, camera, retraction, suturing, closure, dressings etc.)    ANESTHESIA: General anesthesia     ESTIMATED BLOOD LOSS:   10 ml    COMPLICATIONS: none       INDICATIONS: This is a 68 year old female who has evidence ofAcute on Chronic cholecystitis due to Cholelithiasis, h/o choledocholithiasis, s/p ERCP.  I had a lengthy discussion with the patient as etiology of the above.  I discussed treatment options of continued observation versus oral dissolution versus lithotripsy versus surgical management.  Open versus laparoscopic versus robotic cholecystectomy were discussed in detail.  Risk of the procedure including bleeding, infection, postoperative hematoma, wound infection, nonhealing wound, scar formation, perforated viscus, vascular injury, bile duct injury, bile leak, retained common bile duct stones, need for, duct exploration, need to convert to an open procedure, as well as risk of anesthesia including myocardial infarction, respiratory failure, renal failure, pulmonalis, DVT, and even death were all discussed in detail with the patient understands consents and wishes to proceed with the operation.    Operation:  The patient was brought to the operating room and  placed in the supine position. General anesthetic was administered via endotracheal tube by anesthesia.  . The patient's stomach was decompressed With an orogastric tube and the bladder was decompressed with Wright catheter.  The abdomen was prepped and draped in routine sterile fashion.  Local anesthetic was anesthetized and injected into the umbilical region and all port sites. A small incision was made in the umbilicus.  A Veress needle was introduced into the abdominal cavity without difficulty.  Using saline drop test the place was confirmed and pneumoperitoneum was established approximately 15 mmHg.  Next, an 8 mm robotic Visiport trocar was inserted into the abdominal cavity of all 4 quadrants revealed no evidence of abdominal, bowel, or vascular injury present.   3 8 mm robotic trochars were placed, one in the left lateral mid abdomen and and 2 in the right mid abdomen region under direct localization without difficulty.   The gallbladder was visualized and this was grasped with a grasper and retracted over the dome of the liver.  ICG green was given preoperatively by nursing.  Firefly was used with immunofluorescent cholangiography revealing the cystic duct and the common bile duct anatomy.  Care was taken to avoid injury to the common bile duct.  Calot's triangle was dissected free using blunt and sharp dissection. The cystic duct and cystic arteries were dissected free at the base of the gallbladder.  A Hemoclip was placed around the cystic duct at the base of the gallbladder.  A small incision was made in the cystic duct and transected the cholangiogram was performed. Cholangiogram revealed good flow into the intrahepatic ducts as well extrahepatic biliary system.       There was good flow into the duodenum.  There was no intraluminal filling defects present.    this was confirmed with the radiologist intraoperatively .  There was some edema of the ampulla.  Patient status post ERCP with stone extraction  with and stricturotomy.  No large filling defects were visualized.  The cholangiocatheter was removed and 3 hemoclips were placed around the cystic duct with care to avoid tenting the cystic duct common duct junction. In a similar fashion, the cystic artery was then clipped with 2 hemoclips proximally and one distally and the base of the gallbladder.  The cystic duct and cystic artery were then divided with scissors.  The gallbladder was removed from the liver bed using electrocautery.  The gallbladder was then placed into the Endosac and brought out through the umbilicus without difficulty.  Pneumoperitoneum was reestablished approxi-15 mmHg.     due to the history of elevated liver enzymes a core liver biopsy was obtained using a 14 gauge Bard core liver biopsy.  Several biopsies were obtained and sent to pathology.  Hemostasis of the biopsy sites were obtained using electrocautery.   Hemostasis of the liver bed was obtained using electrocautery.  The right upper quadrant was irrigated and aspirated with copious amounts of saline solution.  There was no active bleeding present.    .  The fascia of the 8 mm robotic trocar extraction site was closed using an Endo Close under direct visualization with 0 Vicryl suture.  The umbilical port was removed and the fascia was closed with no impingement of bowel or bleeding present.  The remaining pneumoperitoneum was removed, and the 8 mm robotic trocars were removed under direct visualization, jwith no impingement of bowel or bleeding was present. The wounds were irrigated thoroughly with saline solution.  The skin was approximated with 4-0 Vicryl running subcuticular suture.  Mastisol and Steri-Strips were applied to the wound.  Sterile dressings were applied to the wound.  The patient was transferred off the operative table to recovery room extubated in stable condition.  All counts were correct at the end of the case.  The role of my assistant was critical to the  operation.  They acted in manipulation of the camera, retraction of the gallbladder as well as closure of the abdominal wounds.        INEZ MAJANO JR., MD. [facility]  GENERAL SURGERY  [facility]    5/23/2024  4:23 PM  Lesa Castellon DO

## 2024-05-23 NOTE — PROGRESS NOTES
[facility]  part of [facility]    Progress Note    Lennie Richards Patient Status:  Inpatient    1955 MRN [id number]   Location [facility] 4W/SW/SE Attending Rose Tan MD   Hosp Day # 2 PCP Lesa Castellon DO       Subjective:   Lennie Richards is feeling ok. Afebrile. Still having abdominal pain.     Objective:   Blood pressure 128/72, pulse 60, temperature 98.5 °F (36.9 °C), temperature source Oral, resp. rate 16, height 5' 6\" (1.676 m), weight 185 lb (83.9 kg), SpO2 94%, not currently breastfeeding.    Physical Exam:    General: No acute distress.   Respiratory: Clear to auscultation bilaterally. No wheezes. No rhonchi.  Cardiovascular: S1, S2. Regular rate and rhythm. No murmurs, rubs or gallops.   Abdomen: Soft, nontender, nondistended.  Positive bowel sounds. No rebound or guarding.  Neurologic: No focal neurological deficits.   Musculoskeletal: Moves all extremities.  Extremities: No edema.    Results:     Lab Results   Component Value Date    WBC 6.8 2024    HGB 13.1 2024    HCT 39.1 2024    .0 2024    CREATSERUM 0.81 2024    BUN 9 2024     2024    K 3.7 2024     2024    CO2 26.0 2024     (H) 2024    CA 8.7 2024    ALB 3.9 2024    ALKPHO 101 2024    BILT 0.6 2024    TP 6.4 2024    AST 96 (H) 2024     (H) 2024    INR 0.99 2024    TSH 2.500 2023    PB 75 2024    LIP 33 2024    CRP <0.29 2023    MG 2.0 2024    PHOS 3.4 2024    TROP 0.00 2017       XR ENDO BILE/PANCREAS (CPT=74330)    Result Date: 2024  CONCLUSION: ERCP as above notable for cholelithiasis, filling defects in the common bile duct, extrahepatic biliary ductal dilatation, and balloon sweep.  Please refer to procedure note for additional details.  Delete  Dictated by (CST): London Dias MD on  5/22/2024 at 4:31 PM     Finalized by (CST): London Dias MD on 5/22/2024 at 4:34 PM          MRI ABDOMEN AND MRCP W/3D (CPT=74181/13828)    Result Date: 5/20/2024  CONCLUSION:   Severe cholelithiasis without MRI evidence of acute cholecystitis.  Choledocholithiasis with dilated common bile duct.  No intrahepatic biliary ductal dilation.  7 mm pancreatic tail IPMN.  Follow-up MRCP suggested in 12 months to demonstrate stability.    Dictated by (CST): Jose Luis Hollingsworth MD on 5/20/2024 at 8:07 PM     Finalized by (CST): Jose Luis Hollingsworth MD on 5/20/2024 at 8:11 PM              indocyanine green  5 mg Intravenous Once    calcium carbonate-vitamin D  1 tablet Oral Daily    famotidine  20 mg Intravenous Q12H    piperacillin-tazobactam  3.375 g Intravenous Q8H    heparin  5,000 Units Subcutaneous Q8H LURDES    amLODIPine  5 mg Oral Nightly    escitalopram  20 mg Oral Nightly       acetaminophen    acetaminophen **OR** HYDROcodone-acetaminophen **OR** HYDROcodone-acetaminophen    morphINE **OR** morphINE **OR** morphINE    ondansetron      Assessment and Plan:     Choledocholithiasis  -Patient presenting with right upper quadrant pain  -MRCP performed as outpatient noted severe cholelithiasis and choledocholithiasis with dilated common bile duct.  Did not note evidence of acute cholecystitis.  -LFT elevations noted, continue trending  -Continue on IV fluids and empiric IV antibiotics  -Pain control as able, close monitoring with narcotics  -GI consulted  - ERCP done today 5/22- status post sphincterotomy and balloon sweep with complete clearance   - patient will also need cholecystectomy and core liver biopsy- to be done today 5/23  -General surgery on consult, appreciate recommendations regarding timing of cholecystectomy.     HTN  - controlled  - CPM  - Monitor and adjust as needed     Hyperlipidemia  -Continue statin           Quality:  DVT Prophylaxis: Heparin  CODE status: Full    MDM.high      WNIG PORTILLO MD  05/23/24

## 2024-05-23 NOTE — BRIEF OP NOTE
Pre-Operative Diagnosis: Gallstones, elevated liver enzymes     Post-Operative Diagnosis: Gallstones, elevated liver enzymes      Procedure Performed: Robotic cholecystectomy with cholangiogram, robotic for liver biopsy    Surgeons and Role:     * Go Mock MD - Primary    Assistant(s):  PA: Gigi Duran PA     Surgical Findings: Choledocholithiasis and gallstone     Specimen: Gallbladder     Estimated Blood Loss: 10 mL    Dictation Number:      Go Mock MD  5/23/2024  4:22 PM

## 2024-05-23 NOTE — PLAN OF CARE
Pt is alert and oriented x4, hard of hearing. NPO since midnight for surgery. Denies any pain or nausea. Up independently. Call light within reach. IVF infusing and IV abx given. Voiding freely. Call light within reach. Fall precautions maintained.    Problem: Patient Centered Care  Goal: Patient preferences are identified and integrated in the patient's plan of care  Description: Interventions:  - What would you like us to know as we care for you?   - Provide timely, complete, and accurate information to patient/family  - Incorporate patient and family knowledge, values, beliefs, and cultural backgrounds into the planning and delivery of care  - Encourage patient/family to participate in care and decision-making at the level they choose  - Honor patient and family perspectives and choices  Outcome: Progressing     Problem: Patient/Family Goals  Goal: Patient/Family Long Term Goal  Description: Patient's Long Term Goal:     Interventions:  -   - See additional Care Plan goals for specific interventions  Outcome: Progressing  Goal: Patient/Family Short Term Goal  Description: Patient's Short Term Goal:     Interventions:   -   - See additional Care Plan goals for specific interventions  Outcome: Progressing     Problem: PAIN - ADULT  Goal: Verbalizes/displays adequate comfort level or patient's stated pain goal  Description: INTERVENTIONS:  - Encourage pt to monitor pain and request assistance  - Assess pain using appropriate pain scale  - Administer analgesics based on type and severity of pain and evaluate response  - Implement non-pharmacological measures as appropriate and evaluate response  - Consider cultural and social influences on pain and pain management  - Manage/alleviate anxiety  - Utilize distraction and/or relaxation techniques  - Monitor for opioid side effects  - Notify MD/LIP if interventions unsuccessful or patient reports new pain  - Anticipate increased pain with activity and pre-medicate as  appropriate  Outcome: Progressing     Problem: RISK FOR INFECTION - ADULT  Goal: Absence of fever/infection during anticipated neutropenic period  Description: INTERVENTIONS  - Monitor WBC  - Administer growth factors as ordered  - Implement neutropenic guidelines  Outcome: Progressing     Problem: SAFETY ADULT - FALL  Goal: Free from fall injury  Description: INTERVENTIONS:  - Assess pt frequently for physical needs  - Identify cognitive and physical deficits and behaviors that affect risk of falls.  - [place] fall precautions as indicated by assessment.  - Educate pt/family on patient safety including physical limitations  - Instruct pt to call for assistance with activity based on assessment  - Modify environment to reduce risk of injury  - Provide assistive devices as appropriate  - Consider OT/PT consult to assist with strengthening/mobility  - Encourage toileting schedule  Outcome: Progressing     Problem: DISCHARGE PLANNING  Goal: Discharge to home or other facility with appropriate resources  Description: INTERVENTIONS:  - Identify barriers to discharge w/pt and caregiver  - Include patient/family/discharge partner in discharge planning  - Arrange for needed discharge resources and transportation as appropriate  - Identify discharge learning needs (meds, wound care, etc)  - Arrange for interpreters to assist at discharge as needed  - Consider post-discharge preferences of patient/family/discharge partner  - Complete POLST form as appropriate  - Assess patient's ability to be responsible for managing their own health  - Refer to Case Management Department for coordinating discharge planning if the patient needs post-hospital services based on physician/LIP order or complex needs related to functional status, cognitive ability or social support system  Outcome: Progressing

## 2024-05-23 NOTE — ANESTHESIA PROCEDURE NOTES
Airway  Date/Time: 5/23/2024 2:38 PM  Urgency: Elective      General Information and Staff    Patient location during procedure: OR  Anesthesiologist: Susannah Urena MD  Performed: anesthesiologist   Performed by: Susannah Urena MD  Authorized by: Susannah Urena MD      Indications and Patient Condition  Indications for airway management: anesthesia  Sedation level: deep  Preoxygenated: yes  Patient position: sniffing  Mask difficulty assessment: 1 - vent by mask    Final Airway Details  Final airway type: endotracheal airway      Successful airway: ETT  Cuffed: yes   Successful intubation technique: direct laryngoscopy  Endotracheal tube insertion site: oral  Blade: GlideScope  Blade size: #4  ETT size (mm): 7.0    Cormack-Lehane Classification: grade I - full view of glottis  Placement verified by: capnometry   Cuff volume (mL): 7  Measured from: lips  ETT to lips (cm): 21  Number of attempts at approach: 1

## 2024-05-24 VITALS
SYSTOLIC BLOOD PRESSURE: 110 MMHG | WEIGHT: 185 LBS | TEMPERATURE: 98 F | HEIGHT: 66 IN | OXYGEN SATURATION: 96 % | BODY MASS INDEX: 29.73 KG/M2 | HEART RATE: 66 BPM | RESPIRATION RATE: 16 BRPM | DIASTOLIC BLOOD PRESSURE: 60 MMHG

## 2024-05-24 LAB
ALBUMIN SERPL-MCNC: 3.7 G/DL (ref 3.2–4.8)
ALBUMIN/GLOB SERPL: 1.6 {RATIO} (ref 1–2)
ALP LIVER SERPL-CCNC: 81 U/L
ALT SERPL-CCNC: 203 U/L
ANION GAP SERPL CALC-SCNC: 5 MMOL/L (ref 0–18)
AST SERPL-CCNC: 91 U/L (ref ?–34)
BASOPHILS # BLD AUTO: 0.01 X10(3) UL (ref 0–0.2)
BASOPHILS NFR BLD AUTO: 0.1 %
BILIRUB SERPL-MCNC: 0.5 MG/DL (ref 0.2–1.1)
BUN BLD-MCNC: 15 MG/DL (ref 9–23)
BUN/CREAT SERPL: 16.9 (ref 10–20)
CALCIUM BLD-MCNC: 8.6 MG/DL (ref 8.7–10.4)
CHLORIDE SERPL-SCNC: 110 MMOL/L (ref 98–112)
CO2 SERPL-SCNC: 27 MMOL/L (ref 21–32)
CREAT BLD-MCNC: 0.89 MG/DL
DEPRECATED RDW RBC AUTO: 46.4 FL (ref 35.1–46.3)
EGFRCR SERPLBLD CKD-EPI 2021: 70 ML/MIN/1.73M2 (ref 60–?)
EOSINOPHIL # BLD AUTO: 0 X10(3) UL (ref 0–0.7)
EOSINOPHIL NFR BLD AUTO: 0 %
ERYTHROCYTE [DISTWIDTH] IN BLOOD BY AUTOMATED COUNT: 13.2 % (ref 11–15)
GLOBULIN PLAS-MCNC: 2.3 G/DL (ref 2–3.5)
GLUCOSE BLD-MCNC: 109 MG/DL (ref 70–99)
HCT VFR BLD AUTO: 33.6 %
HGB BLD-MCNC: 11.4 G/DL
IMM GRANULOCYTES # BLD AUTO: 0.04 X10(3) UL (ref 0–1)
IMM GRANULOCYTES NFR BLD: 0.4 %
LYMPHOCYTES # BLD AUTO: 1.03 X10(3) UL (ref 1–4)
LYMPHOCYTES NFR BLD AUTO: 11.1 %
MAGNESIUM SERPL-MCNC: 1.9 MG/DL (ref 1.6–2.6)
MCH RBC QN AUTO: 32.5 PG (ref 26–34)
MCHC RBC AUTO-ENTMCNC: 33.9 G/DL (ref 31–37)
MCV RBC AUTO: 95.7 FL
MONOCYTES # BLD AUTO: 0.58 X10(3) UL (ref 0.1–1)
MONOCYTES NFR BLD AUTO: 6.3 %
NEUTROPHILS # BLD AUTO: 7.59 X10 (3) UL (ref 1.5–7.7)
NEUTROPHILS # BLD AUTO: 7.59 X10(3) UL (ref 1.5–7.7)
NEUTROPHILS NFR BLD AUTO: 82.1 %
OSMOLALITY SERPL CALC.SUM OF ELEC: 295 MOSM/KG (ref 275–295)
PHOSPHATE SERPL-MCNC: 3.5 MG/DL (ref 2.4–5.1)
PLATELET # BLD AUTO: 179 10(3)UL (ref 150–450)
POTASSIUM SERPL-SCNC: 3.7 MMOL/L (ref 3.5–5.1)
PROT SERPL-MCNC: 6 G/DL (ref 5.7–8.2)
RBC # BLD AUTO: 3.51 X10(6)UL
SODIUM SERPL-SCNC: 142 MMOL/L (ref 136–145)
WBC # BLD AUTO: 9.3 X10(3) UL (ref 4–11)

## 2024-05-24 PROCEDURE — 99239 HOSP IP/OBS DSCHRG MGMT >30: CPT | Performed by: HOSPITALIST

## 2024-05-24 NOTE — DISCHARGE SUMMARY
[facility]  part of [facility]    Discharge Summary    Lennie Richards Patient Status:  Inpatient    1955 MRN [id number]   Location [facility] 4W/SW/SE Attending Rose Tan MD   Hosp Day # 3 PCP Lesa Castellon DO     Date of Admission: 2024   Date of Discharge: 2024    Hospital Discharge Diagnoses: Acute Choledocholithiasis    Lace+ Score: 56  59-90 High Risk  29-58 Medium Risk  0-28   Low Risk.    TCM Follow-Up Recommendation:  LACE > 58: High Risk of readmission after discharge from the hospital.        Admitting Diagnosis: Choledocholithiasis [K80.50]  Biliary calculus of other site with obstruction [K80.81]    Disposition: Home    Discharge Diagnosis: .Principal Problem:    Choledocholithiasis  Active Problems:    Biliary calculus of other site with obstruction      Hospital Course:   Reason for Admission:   Per Dr. Gaytan  This is a 68 year oldfemale who presented complaining of abdominal pain.  Patient states symptoms started around 1 week prior.  Patient described right upper quadrant pain, sharp in nature.  Patient initially was evaluated by her primary care physician who recommended further evaluation with surgery.  Patient saw the surgeon who recommended MRCP.  Patient stated she was called by surgery team after resulting MRCP and noted to signs of stones in the bile duct and recommended she present to the ED for further evaluation.  At time of interview, patient reports pain is improved with pain medications.  Denies current nausea or vomiting.  Patient otherwise denies chest pain, shortness of breath, fevers or chills.     Discharge Physical Exam:   Physical Exam:    General: No acute distress.   Respiratory: Clear to auscultation bilaterally. No wheezes. No rhonchi.  Cardiovascular: S1, S2. Regular rate and rhythm. No murmurs, rubs or gallops.   Abdomen: Soft, nontender, nondistended.  Positive bowel sounds. No rebound or  guarding.  Neurologic: No focal neurological deficits.   Musculoskeletal: Moves all extremities.    Hospital Course:     Choledocholithiasis  -Patient presenting with right upper quadrant pain  -MRCP performed as outpatient noted severe cholelithiasis and choledocholithiasis with dilated common bile duct.  Did not note evidence of acute cholecystitis.  -LFT elevations noted, continue trending  -Continue on IV fluids and empiric IV antibiotics  -Pain control as able, close monitoring with narcotics  -GI consulted  - ERCP done today 5/22- status post sphincterotomy and balloon sweep with complete clearance   - patient will also need cholecystectomy and core liver biopsy- to be done today 5/23  -General surgery on consult- status post  cholecystectomy POD 1     HTN  - controlled  - CPM  - Monitor and adjust as needed     Hyperlipidemia  -Continue statin              Quality:  DVT Prophylaxis: Heparin  CODE status: Full     MDM.high    Complications: none    Consultants         Provider   Role Specialty     Joanna Wan MD      Consulting Physician GASTROENTEROLOGY     Go Mock MD      Consulting Physician SURGERY, GENERAL          Surgical Procedures       Case IDs Date Procedure Surgeon Location Status    [id number] 5/22/24 ENDOSCOPIC RETROGRADE CHOLANGIOPANCREATOGRAPHY (ERCP) Joanna Wan MD [facility] ENDOSCOPY Comp    [id number] 5/23/24 Robotic cholecystectomy with cholangiogram, core liver biopsy Go Mock MD [facility] MAIN OR Comp          Pending Labs       Order Current Status    Specimen to Pathology Tissue In process    Anaerobic Culture Preliminary result    Blood Culture Preliminary result    Blood Culture Preliminary result    Tissue Aerobic Culture Preliminary result            Discharge Plan:   Discharge Condition: Stable    Current Discharge Medication List        New Orders    Details   HYDROcodone-acetaminophen 5-325 MG Oral Tab Take 1 tablet by mouth every 4 (four) hours as needed for Pain.       docusate sodium (COLACE) 100 MG Oral Cap Take 1 capsule (100 mg total) by mouth 2 (two) times daily for 7 days.           Home Meds - Unchanged    Details   Phentermine HCl 30 MG Oral Cap Take 1 capsule (30 mg total) by mouth every morning.      estradiol 0.025 MG/24HR Transdermal Patch Biweekly APPLY 1 PATCH TOPICALLY TO THE SKIN 2 TIMES A WEEK      OMEPRAZOLE 40 MG Oral Capsule Delayed Release TAKE 1 CAPSULE BY MOUTH EVERY MORNING AND EVERY NIGHT AT BEDTIME      escitalopram 20 MG Oral Tab Take 1 tablet (20 mg total) by mouth daily.      amLODIPine 5 MG Oral Tab Take 1 tablet (5 mg total) by mouth daily.      LUTEIN OR Take by mouth daily.      Bilberry, Vaccinium myrtillus, (BILBERRY OR) Take by mouth daily.      DHA-EPA-Coenzyme Q10-Vitamin E (COQ-10 & FISH OIL OR) Take 1 tablet by mouth daily.      Glucosamine-Chondroitin (GLUCOSAMINE CHONDR COMPLEX OR) Take 1 tablet by mouth daily.      Multiple Vitamin Oral Tab Take 1 tablet by mouth daily.      Cholecalciferol (VITAMIN D) 1000 UNITS Oral Tab Take  by mouth.      Calcium Carbonate-Vitamin D (OSCAL-500) 500-200 MG-UNIT Oral Tab Take 1 tablet by mouth daily.                 Discharge Diet: As tolerated and General diet    Discharge Activity: As tolerated       Discharge Medications        START taking these medications        Instructions Prescription details   docusate sodium 100 MG Caps  Commonly known as: Colace      Take 1 capsule (100 mg total) by mouth 2 (two) times daily for 7 days.   Stop taking on: May 30, 2024  Quantity: 14 capsule  Refills: 0     HYDROcodone-acetaminophen 5-325 MG Tabs  Commonly known as: Norco      Take 1 tablet by mouth every 4 (four) hours as needed for Pain.   Quantity: 10 tablet  Refills: 0            CHANGE how you take these medications        Instructions Prescription details   amLODIPine 5 MG Tabs  Commonly known as: Norvasc  What changed: when to take this      Take 1 tablet (5 mg total) by mouth daily.   Quantity: 90  tablet  Refills: 3     escitalopram 20 MG Tabs  Commonly known as: Lexapro  What changed: when to take this      Take 1 tablet (20 mg total) by mouth daily.   Quantity: 90 tablet  Refills: 3     Omeprazole 40 MG Cpdr  What changed: when to take this      TAKE 1 CAPSULE BY MOUTH EVERY MORNING AND EVERY NIGHT AT BEDTIME   Quantity: 180 capsule  Refills: 0            CONTINUE taking these medications        Instructions Prescription details   BILBERRY OR      Take by mouth daily.   Refills: 0     Calcium Carbonate-Vitamin D 500-200 MG-UNIT Tabs  Commonly known as: OSCAL-500      Take 1 tablet by mouth daily.   Refills: 0     COQ-10 & FISH OIL OR      Take 1 tablet by mouth daily.   Refills: 0     estradiol 0.025 MG/24HR Pttw  Commonly known as: Vivelle      APPLY 1 PATCH TOPICALLY TO THE SKIN 2 TIMES A WEEK   Quantity: 8 patch  Refills: 11     GLUCOSAMINE CHONDR COMPLEX OR      Take 1 tablet by mouth daily.   Refills: 0     LUTEIN OR      Take by mouth daily.   Refills: 0     Multiple Vitamin Tabs      Take 1 tablet by mouth daily.   Refills: 0     Phentermine HCl 30 MG Caps      Take 1 capsule (30 mg total) by mouth every morning.   Quantity: 30 capsule  Refills: 1     Vitamin D 1000 units Tabs      Take  by mouth.   Refills: 0               Where to Get Your Medications        These medications were sent to [id number] - [place], [place] - [address] AT [facility], [phone], [phone]  [address], [facility] [address]      Phone: [phone]   docusate sodium 100 MG Caps  HYDROcodone-acetaminophen 5-325 MG Tabs         Follow up:      Follow-up Information       Gigi Duran PA Follow up in 10 day(s).    Specialty: Physician Assistant  Why: Call for follow-up postoperatively, 6.4.24  Contact information:  [address]   [address]  [facility] [address] [phone]               Lesa Castellon, DO Follow up in 1 week(s).    Specialty:  Internal Medicine  Contact information:  [address] [place] [address]  [phone]                             Follow up Labs and imaging:         Other Discharge Instructions:          Dr. Go Mock   [phone]    DISCHARGE INSTRUCTIONS- ROBOTIC CHOLECYSTECTOMY      Activity can be resumed as tolerated including walking, stairs, light exercise and driving short distances.  Heavy lifting (i.e. objects > 15-20 lbs.) is to be avoided for 3-4 weeks.  Normal time off work is one to two weeks.    Incisional  pains are commonly of moderate intensity in the first 24-48 hours and gradually improve over the initial post-operative week.  Please take tylenol extra strength 1   500mg tabs every 6 hours around the clock.  Also take  Aleve 1 tablets every 12 hours around the clock.   Prescription pain medication (Norco) should be used initially according to the pain experienced and gradually weaned over the next few days. Please be aware that the prescription pain medication contains the equivalent to 1 dose of Tylenol. Do not exceed 3000mg of Tylenol in a 24 hour period. Prescription pain medication can make you nauseated, light-headed and constipated: it should be taken with food and discontinued if side-effects develop.  A prescription for  stool softener  (Colace) is helpful to avoid constipation. Take 1 orally twice a day.   If  no bowel movement within 48 hours, MOM 30 mls may be helpful.    Your diet should consist primarily of liquids until you have a good appetite, usually the first day after surgery.  Fatty or fried foods should be avoided in the first week or two after surgery but subsequently a general diet may be resumed.    The dressing should not be removed until the first office visit.  You may shower in 24 hours, no bath or swimming for 4 weeks from your surgery date.  Apply an ice bag over your dressing for 72 hours.  No driving for 5 days or until off pain medication.   If the gauze  dressing gets wet remove the Tegaderm and gauze but leave the Steri-Strips in place.    Activity after surgery  After surgery, take it easy for the rest of the day. If you had general anesthesia, don’t use machinery or power tools, drink alcohol, or make any major decisions for at least the first 24 hours.  Don’t drive while you are still taking opioid pain medication, and don’t drive u.ntil you are able to step firmly on the brake pedal without hesitation.  Ask others to help with chores and errands while you recover.  Don’t lift anything heavier than 10 pounds until your doctor says it’s OK.  Don’t mow the lawn, shovel snow, use a vacuum , or do other strenuous activities until your doctor says it’s okay.  Walk as often as you feel able.  Continue the coughing and deep breathing exercises that you learned in the hospital.  Ask your doctor when you can expect to return to work.  Avoid constipation:  Eat fruits, vegetables, and whole grains   Drink 6-8 glasses of water a day, unless otherwise instructed.  Use a laxative or a mild stool softener as instructed by your doctor.  Call your doctor, or the 24-hour answering service, immediately if you have any of the following:  Yellowing of your eyes or skin (jaundice)  Chills  Fever above 101.5°F or 38.5°C    Redness, swelling, increasing pain, pus, or a foul smell at the incision site  Dark or rust-colored urine  Stool that is nely-colored or light in color instead of brown  Increasing abdominal pain  persistent nausea or bowel problems, uncontrolled pain or poor appetite     Contact the office tomorrow to make a follow appointment with Gigi Duran    for 6.4.24 after surgery.    Instructions given by EVELYN Zhou PA-C  General Surgery  [facility]  [address] [address]  [phone]  [phone]      Go Mock MD. [facility]  GENERAL SURGERY  [facility]  OFFICE  [phone]    5/23/2024  1:29 PM          POST-ERCP DISCHARGE INSTRUCTIONS      PROCEDURE PERFORMED:  ERCP with sphincterotomy and balloon sweep    ENDOSCOPIST: Joanna Wan MD    FINDINGS:  Choledocholithiasis    MEDICATIONS: You may resume all other medications today    DIET: You may resume your regular diet today.    BIOPSIES: No biopsies were taken    X-RAYS: No X-rays/Labs were ordered today        Activity for remainder of today:  REST TODAY  DO NOT drive or operate heavy machinery  DO NOT drink any alcoholic beverages  DO NOT sign any legal documents or make any important decisions    After your procedure(s):  It is not unusual to feel bloated or gassy .  Passing gas and belching is encouraged. Lying on your left side with your knees flexed may relieve the discomfort. A hot pack to the abdomen may also help. If you had an upper endoscopy (EGD), you may experience a slight sore throat which will subside. Throat lozenges or salt water gargle can be used.    FOLLOW-UP:  Contact the office at [phone] if a follow-up appointment is needed or if you develop any of the following:    Severe abdominal pain/discomfort   Excessive bleeding  Black tarry stool  Difficulty breathing/swallowing  Persistent nausea/vomiting    Fever above 100 degrees or chills                      Time spent:  > 30 minutes    WING PORTILLO MD  5/24/2024

## 2024-05-24 NOTE — PROGRESS NOTES
[facility]  part of [facility]    General Surgery Progress Note  Lennie Richards  : 1955  CSN: [id number]  HD# 3    Subjective:   POD#1 laparoscopic cholecystectomy   No unusual complaints mild incisional pain as expected, denies abdominal pain, nausea, and vomiting  Passing flatus, no BM     Exam:     General: awake and alert, in no acute distress, comfortable, and in good spirits  Pulmonary:lungs clear to auscultation bilaterally  Cardiac: RRR, nl S1,S2, no S3, no S4, and no murmur  Abdomen: normal BS, nondistended, and nontender except mild tenderness at incision   Extremities: calves nontender, no edema, motor intact, and sensory intact  Wounds: clean, dry and intact     Assessment and Plan:   Choledocholithiasis  S/p robotic lap marquise    Doing well  Diet: Tolerating diet, advance as tolerated  IVF: Saline lock  Antibiotics: no need for further antibiotics beyond perioperative doses    Activity: OOB to chair, more ambulation encouraged, and Ambulation in halls at least TID  DVT prophylaxis: SCDs and chemoprophylaxis as ordered    Discharge disposition: OK to discharge later today from surgical standpoint  Instructions given  F/u with Dr. Mock or his PA in 2 weeks.        Objective:     Vitals:    24 0433 24 0830 24 0831 24 1207   BP: 125/71   110/60   Pulse: 58   66   Resp: 18   16   Temp: 98.1 °F (36.7 °C)   97.6 °F (36.4 °C)   TempSrc: Oral   Oral   SpO2: 92% (!) 84% 91% 97%   Weight:       Height:         Body mass index is 29.86 kg/m².    Intake/Output Summary (Last 24 hours) at 2024 1315  Last data filed at 2024 1213  Gross per 24 hour   Intake 2320 ml   Output 700 ml   Net 1620 ml       Results:     Recent Labs   Lab 24  0636 24  0642 24  0620   RBC 4.04 4.09 3.51*   HGB 13.1 13.1 11.4*   HCT 37.7 39.1 33.6*   MCV 93.3 95.6 95.7   MCH 32.4 32.0 32.5   MCHC 34.7 33.5 33.9   RDW 12.8 12.9 13.2   NEPRELIM 2.78 5.46  7.59   WBC 4.4 6.8 9.3   .0 198.0 179.0       Recent Labs   Lab 05/22/24  0637 05/23/24  0642 05/24/24  0620   * 107* 109*   BUN 9 9 15   CREATSERUM 0.80 0.81 0.89   CA 8.9 8.7 8.6*    144 142   K 3.5 3.7 3.7    111 110   CO2 28.0 26.0 27.0       Lab Results   Component Value Date    WBC 9.3 05/24/2024    HGB 11.4 05/24/2024    HCT 33.6 05/24/2024    .0 05/24/2024     05/24/2024    K 3.7 05/24/2024     05/24/2024    CO2 27.0 05/24/2024    BUN 15 05/24/2024    CREATSERUM 0.89 05/24/2024     05/24/2024    MG 1.9 05/24/2024    PHOS 3.5 05/24/2024    BILT 0.5 05/24/2024    AST 91 05/24/2024     05/24/2024    CA 8.6 05/24/2024    ALB 3.7 05/24/2024    TP 6.0 05/24/2024       XR OPER CHOLANGIOGRAM (CPT=74300)    Result Date: 5/23/2024  CONCLUSION:  1. Moderate dilatation of common bile duct and mild intrahepatic biliary dilatation noted.  Vague filling defect at the distal portion of the duct which appear to clear somewhat after the 1st film, and the final 2 films demonstrate vague persistence of radiolucent filling defect as described above.  Case discussed with Dr. Mock.     Dictated by (CST): Abelardo Overton MD on 5/23/2024 at 4:32 PM     Finalized by (CST): Abelardo Overton MD on 5/23/2024 at 4:37 PM          XR ENDO BILE/PANCREAS (CPT=74330)    Result Date: 5/22/2024  CONCLUSION: ERCP as above notable for cholelithiasis, filling defects in the common bile duct, extrahepatic biliary ductal dilatation, and balloon sweep.  Please refer to procedure note for additional details.  Delete  Dictated by (CST): London Dias MD on 5/22/2024 at 4:31 PM     Finalized by (CST): London Dias MD on 5/22/2024 at 4:34 PM                 Nathaniel Jacob MD [facility]  05/24/24  1:15 PM

## 2024-05-24 NOTE — PLAN OF CARE
Patient a/ox4. RA. 0.9 running at 100ml/hr. 5 lap sites to abdomen some old drainage notes to dressing on right abdomen. Will reinforce as needed. Received IV abx zosyn. Pain managed with norco and morphine. Plan for discharge pending pain management and tolerating diet. Bed in lowest position. Safety precaution in place. Call light within reach.     Problem: Patient Centered Care  Goal: Patient preferences are identified and integrated in the patient's plan of care  Description: Interventions:  - What would you like us to know as we care for you?   - Provide timely, complete, and accurate information to patient/family  - Incorporate patient and family knowledge, values, beliefs, and cultural backgrounds into the planning and delivery of care  - Encourage patient/family to participate in care and decision-making at the level they choose  - Honor patient and family perspectives and choices  Outcome: Progressing     Problem: Patient/Family Goals  Goal: Patient/Family Long Term Goal  Description: Patient's Long Term Goal:     Interventions:  -   - See additional Care Plan goals for specific interventions  Outcome: Progressing  Goal: Patient/Family Short Term Goal  Description: Patient's Short Term Goal:     Interventions:   -   - See additional Care Plan goals for specific interventions  Outcome: Progressing     Problem: PAIN - ADULT  Goal: Verbalizes/displays adequate comfort level or patient's stated pain goal  Description: INTERVENTIONS:  - Encourage pt to monitor pain and request assistance  - Assess pain using appropriate pain scale  - Administer analgesics based on type and severity of pain and evaluate response  - Implement non-pharmacological measures as appropriate and evaluate response  - Consider cultural and social influences on pain and pain management  - Manage/alleviate anxiety  - Utilize distraction and/or relaxation techniques  - Monitor for opioid side effects  - Notify MD/LIP if interventions  unsuccessful or patient reports new pain  - Anticipate increased pain with activity and pre-medicate as appropriate  Outcome: Progressing     Problem: RISK FOR INFECTION - ADULT  Goal: Absence of fever/infection during anticipated neutropenic period  Description: INTERVENTIONS  - Monitor WBC  - Administer growth factors as ordered  - Implement neutropenic guidelines  Outcome: Progressing     Problem: SAFETY ADULT - FALL  Goal: Free from fall injury  Description: INTERVENTIONS:  - Assess pt frequently for physical needs  - Identify cognitive and physical deficits and behaviors that affect risk of falls.  - [place] fall precautions as indicated by assessment.  - Educate pt/family on patient safety including physical limitations  - Instruct pt to call for assistance with activity based on assessment  - Modify environment to reduce risk of injury  - Provide assistive devices as appropriate  - Consider OT/PT consult to assist with strengthening/mobility  - Encourage toileting schedule  Outcome: Progressing     Problem: DISCHARGE PLANNING  Goal: Discharge to home or other facility with appropriate resources  Description: INTERVENTIONS:  - Identify barriers to discharge w/pt and caregiver  - Include patient/family/discharge partner in discharge planning  - Arrange for needed discharge resources and transportation as appropriate  - Identify discharge learning needs (meds, wound care, etc)  - Arrange for interpreters to assist at discharge as needed  - Consider post-discharge preferences of patient/family/discharge partner  - Complete POLST form as appropriate  - Assess patient's ability to be responsible for managing their own health  - Refer to Case Management Department for coordinating discharge planning if the patient needs post-hospital services based on physician/LIP order or complex needs related to functional status, cognitive ability or social support system  Outcome: Progressing

## 2024-05-24 NOTE — PROGRESS NOTES
Patient alert and oriented X4, vitals stable. Denies pain. Continues with IVFs and Zosyn. NPO maintained this am for surgical procedure. IC Green per order. Patient sent for procedure and returned stable. Clear liquid diet started. Patient groggy and a little unstable. Requiring oxygen via NC, deep breathing encouraged. Patient voiding freely. Bed locked and in lowest position, bed alarm for added safety, call light within reach, has been calling appropriately for assistance.     Patient and  expressed they felt patient was not ready for discharge this evening and wanted her to stay the night, Dr Tan and Dr Mock notified.

## 2024-05-24 NOTE — PLAN OF CARE
Patient is A/Ox4 this morning, tolerating pain. Patient weaned of O2 and tolerating ambulation with SpO2 @ 98%. Patient cleared for discharge. AVS reviewed with patient and spouse.    Problem: Patient Centered Care  Goal: Patient preferences are identified and integrated in the patient's plan of care  Description: Interventions:  - What would you like us to know as we care for you?   - Provide timely, complete, and accurate information to patient/family  - Incorporate patient and family knowledge, values, beliefs, and cultural backgrounds into the planning and delivery of care  - Encourage patient/family to participate in care and decision-making at the level they choose  - Honor patient and family perspectives and choices  Outcome: Adequate for Discharge     Problem: Patient/Family Goals  Goal: Patient/Family Long Term Goal  Description: Patient's Long Term Goal: To feel better    Interventions:  - Medications, education.  - See additional Care Plan goals for specific interventions  Outcome: Adequate for Discharge  Goal: Patient/Family Short Term Goal  Description: Patient's Short Term Goal: Get home    Interventions:   - Medications, education.  - See additional Care Plan goals for specific interventions  Outcome: Adequate for Discharge     Problem: PAIN - ADULT  Goal: Verbalizes/displays adequate comfort level or patient's stated pain goal  Description: INTERVENTIONS:  - Encourage pt to monitor pain and request assistance  - Assess pain using appropriate pain scale  - Administer analgesics based on type and severity of pain and evaluate response  - Implement non-pharmacological measures as appropriate and evaluate response  - Consider cultural and social influences on pain and pain management  - Manage/alleviate anxiety  - Utilize distraction and/or relaxation techniques  - Monitor for opioid side effects  - Notify MD/LIP if interventions unsuccessful or patient reports new pain  - Anticipate increased pain with  activity and pre-medicate as appropriate  Outcome: Adequate for Discharge     Problem: RISK FOR INFECTION - ADULT  Goal: Absence of fever/infection during anticipated neutropenic period  Description: INTERVENTIONS  - Monitor WBC  - Administer growth factors as ordered  - Implement neutropenic guidelines  Outcome: Adequate for Discharge     Problem: SAFETY ADULT - FALL  Goal: Free from fall injury  Description: INTERVENTIONS:  - Assess pt frequently for physical needs  - Identify cognitive and physical deficits and behaviors that affect risk of falls.  - [place] fall precautions as indicated by assessment.  - Educate pt/family on patient safety including physical limitations  - Instruct pt to call for assistance with activity based on assessment  - Modify environment to reduce risk of injury  - Provide assistive devices as appropriate  - Consider OT/PT consult to assist with strengthening/mobility  - Encourage toileting schedule  Outcome: Adequate for Discharge     Problem: SAFETY ADULT - FALL  Goal: Free from fall injury  Description: INTERVENTIONS:  - Assess pt frequently for physical needs  - Identify cognitive and physical deficits and behaviors that affect risk of falls.  - [place] fall precautions as indicated by assessment.  - Educate pt/family on patient safety including physical limitations  - Instruct pt to call for assistance with activity based on assessment  - Modify environment to reduce risk of injury  - Provide assistive devices as appropriate  - Consider OT/PT consult to assist with strengthening/mobility  - Encourage toileting schedule  Outcome: Adequate for Discharge     Problem: DISCHARGE PLANNING  Goal: Discharge to home or other facility with appropriate resources  Description: INTERVENTIONS:  - Identify barriers to discharge w/pt and caregiver  - Include patient/family/discharge partner in discharge planning  - Arrange for needed discharge resources and transportation as appropriate  -  Identify discharge learning needs (meds, wound care, etc)  - Arrange for interpreters to assist at discharge as needed  - Consider post-discharge preferences of patient/family/discharge partner  - Complete POLST form as appropriate  - Assess patient's ability to be responsible for managing their own health  - Refer to Case Management Department for coordinating discharge planning if the patient needs post-hospital services based on physician/LIP order or complex needs related to functional status, cognitive ability or social support system  Outcome: Adequate for Discharge

## 2024-05-28 ENCOUNTER — TELEPHONE (OUTPATIENT)
Dept: INTERNAL MEDICINE CLINIC | Facility: CLINIC | Age: 69
End: 2024-05-28

## 2024-05-28 ENCOUNTER — PATIENT OUTREACH (OUTPATIENT)
Dept: CASE MANAGEMENT | Age: 69
End: 2024-05-28

## 2024-05-28 DIAGNOSIS — K80.50 CHOLEDOCHOLITHIASIS: Primary | ICD-10-CM

## 2024-05-28 DIAGNOSIS — Z02.9 ENCOUNTERS FOR UNSPECIFIED ADMINISTRATIVE PURPOSE: ICD-10-CM

## 2024-05-28 PROCEDURE — 1111F DSCHRG MED/CURRENT MED MERGE: CPT

## 2024-05-28 NOTE — PROGRESS NOTES
Initial Post Discharge Follow Up   Discharge Date: 5/24/24  Contact Date: 5/28/2024    Consent Verification:  Assessment Completed With: Patient  HIPAA Verified?  Yes    Discharge Dx:   Principal Problem:    Choledocholithiasis  Active Problems:    Biliary calculus of other site with obstruction  5/22/2024 ERCP  5/23/2024 PROCEDURE PERFORMED: Robotic cholecystectomy with intraoperative cholangiogram and robotic core liver biopsy and with immunofluorescence cholangiography       General:   How have you been since your discharge from the hospital? Pt feeling better, since hospital discharge--lap incisions healing--denies redness, drainage or bleeding. Intermittent incisional pain managed well with Hydrocodone, as needed, appetite good, staying hydrated, independent with ADLs, using IS, as directed. Pt denies fever, chills, headache, vision changes, dizziness, nausea, vomiting, diarrhea, bleeding, intractable abndominal pain, chest pain or shortness of breath at this time.  Do you have any pain since discharge?  Yes  Where: incisional   Rating on pain scale 1-10, 10 being the worst pain you have ever experienced, what is current pain: 4  Alleviating factors: rest, Hydrocodone, ice  Aggravating factors: positional  Is the pain manageable at home? Yes  How well was your pain managed while in the hospital?   On a scale of 1-5   1- Very Poor and 5- Very well   Very Well  When you were leaving the hospital were your discharge instructions reviewed with you? Yes  How well were your discharge instructions explained to you?   On a scale of 1-5   1- Very Poor and 5- Very well   Very Well  Do you have any questions about your discharge instructions?  No  Before leaving the hospital was your diagnoses explained to you? Yes  Do you have any questions about your diagnoses? No  Are you able to perform normal daily activities of living as you have prior to your hospital stay (dressing, bathing, ambulating to the bathroom, etc)?  yes  (NCM) Was patient given a different diet per AVS? yes  If so, which diet?    Your diet should consist primarily of liquids until you have a good appetite, usually the first day after surgery. Fatty or fried foods should be avoided in the first week or two after surgery but subsequently a general diet may be resumed   Are there any barriers to following this diet? no    Medications:   Current Outpatient Medications   Medication Sig Dispense Refill    HYDROcodone-acetaminophen 5-325 MG Oral Tab Take 1 tablet by mouth every 4 (four) hours as needed for Pain. 10 tablet 0    docusate sodium (COLACE) 100 MG Oral Cap Take 1 capsule (100 mg total) by mouth 2 (two) times daily for 7 days. 14 capsule 0    Phentermine HCl 30 MG Oral Cap Take 1 capsule (30 mg total) by mouth every morning. 30 capsule 1    estradiol 0.025 MG/24HR Transdermal Patch Biweekly APPLY 1 PATCH TOPICALLY TO THE SKIN 2 TIMES A WEEK 8 patch 11    OMEPRAZOLE 40 MG Oral Capsule Delayed Release TAKE 1 CAPSULE BY MOUTH EVERY MORNING AND EVERY NIGHT AT BEDTIME (Patient taking differently: Take 1 capsule (40 mg total) by mouth every morning.) 180 capsule 0    escitalopram 20 MG Oral Tab Take 1 tablet (20 mg total) by mouth daily. (Patient taking differently: Take 1 tablet (20 mg total) by mouth at bedtime.) 90 tablet 3    amLODIPine 5 MG Oral Tab Take 1 tablet (5 mg total) by mouth daily. (Patient taking differently: Take 1 tablet (5 mg total) by mouth at bedtime.) 90 tablet 3    LUTEIN OR Take by mouth daily.      Bilberry, Vaccinium myrtillus, (BILBERRY OR) Take by mouth daily.      DHA-EPA-Coenzyme Q10-Vitamin E (COQ-10 & FISH OIL OR) Take 1 tablet by mouth daily.      Glucosamine-Chondroitin (GLUCOSAMINE CHONDR COMPLEX OR) Take 1 tablet by mouth daily.      Multiple Vitamin Oral Tab Take 1 tablet by mouth daily.      Cholecalciferol (VITAMIN D) 1000 UNITS Oral Tab Take  by mouth.      Calcium Carbonate-Vitamin D (OSCAL-500) 500-200 MG-UNIT Oral Tab  Take 1 tablet by mouth daily.       Were there any changes to your current medication(s) noted on the AVS? Yes  START taking:  docusate sodium (Colace)  HYDROcodone-acetaminophen (Norco)  If so, were these medication changes discussed with you prior to leaving the hospital? Yes  If a new medication was prescribed:    Was the new medication's purpose & side effects reviewed? Yes  Do you have any questions about your new medication? No  Did you  your discharge medications when you left the hospital? Yes  Let's go over your medications together to make sure we are not missing anything. Medications Reviewed  Are there any reasons that keep you from taking your medication as prescribed? No  Are you having any concerns with constipation? No  Did patient receive their flu shot (Sept-March)? Yes--9/26/2023    Discharge medications reviewed/discussed/and reconciled against outpatient medications with patient.  Any changes or updates to medications sent to PCP.  Patient Acknowledged     Referrals/orders at D/C:  Referrals/orders placed at D/C? no  DME ordered at D/C? Yes  What? IS  From where? hospital  Have you received your (DME)? yes    Discharge orders, AVS reviewed and discussed with patient. Any changes or updates to orders sent to PCP.  Patient Acknowledged      SDOH:   Transportation:   Transportation Needs: No Transportation Needs (5/28/2024)    Transportation Needs     Lack of Transportation: No     Car Seat: Not on file     Financial Strain:   Financial Resource Strain: Low Risk  (5/28/2024)    Financial Resource Strain     Difficulty of Paying Living Expenses: Not very hard     Med Affordability: No     Follow up appointments:    JUN 4 2024 09:00 AM - [facility]     Your appointments       Date & Time Appointment Department (Center)    Aug 12, 2024 1:00 PM CDT Medicare Annual Well [facility] [place] [facility]              [facility] [address], [facility]  [address] [facility] [address]  [phone]            TCC  Was TCC ordered: No    PCP (If no TCC appointment)  Does patient already have a PCP appointment scheduled? No  NCM Attempted to schedule PCP office TCM appointment with patient   If no appointment scheduled: Explain: pt declines--prefers to f/u with surgery only, at this itme    Specialist    Does the patient have any other follow up appointment(s) needing to be scheduled? No    Is there any reason as to why you cannot make your appointment(s)?  No     Needs post D/C:   Now that you are home, are there any needs or concerns you need addressed before your next visit with your PCP?  (DME, meds, questions, etc.): No    Interventions by NCM:   Discussed diet, activity, medications and need for f/u visits. Pt confirms 6/04/2024 surgical f/u appt with JANEEN Duran. Offered TCM appt with Dr. Castellon-pt declines--prefers to f/u with specialist only, at this time. Sent TE to office staff as FYI/TCM protocol.  Patient aware when to contact PCP/specialists and when to seek emergency care. No further questions/concerns at this time.    Overall Rating:   How would you rate the care you received while in the hospital? excellent    CCM referral placed:    No    BOOK BY DATE: 6/07/2024

## 2024-05-28 NOTE — TELEPHONE ENCOUNTER
Sent as FYI/TCM protocol:    Spoke with patient for TCM today. Pt confirms 6/04/2024 surgical f/u appt with JANEEN Duran. Offered TCM appt with Dr. Castellon-pt declines--prefers to f/u with specialist only, at this time.    BOOK BY DATE (last date for TCM): 6/07/2024          Follow up appointments:      JUN 4 2024 09:00 AM - Office Visit  Surgery - [facility] - Gigi Duran PA     Your appointments       Date & Time Appointment Department (Center)    Aug 12, 2024 1:00 PM CDT Medicare Annual Well Visit with Lesa Castellon DO [facility] ([facility])

## 2024-06-05 ENCOUNTER — OFFICE VISIT (OUTPATIENT)
Dept: INTERNAL MEDICINE CLINIC | Facility: CLINIC | Age: 69
End: 2024-06-05
Payer: MEDICARE

## 2024-06-05 VITALS
BODY MASS INDEX: 29.25 KG/M2 | WEIGHT: 182 LBS | OXYGEN SATURATION: 97 % | HEART RATE: 86 BPM | HEIGHT: 66 IN | DIASTOLIC BLOOD PRESSURE: 80 MMHG | SYSTOLIC BLOOD PRESSURE: 126 MMHG

## 2024-06-05 DIAGNOSIS — E78.00 HYPERCHOLESTEREMIA: Primary | ICD-10-CM

## 2024-06-05 DIAGNOSIS — R79.89 ELEVATED LFTS: ICD-10-CM

## 2024-06-05 PROCEDURE — 99214 OFFICE O/P EST MOD 30 MIN: CPT | Performed by: INTERNAL MEDICINE

## 2024-06-05 NOTE — PROGRESS NOTES
Lennie Richards is a 68 year old female.  Chief Complaint   Patient presents with    TCM (Transition Of Care Management)     5/21-5/24 Stay at [facility] for Choledocholithiasis & Biliary Calculus of Other Site with Obstruction after entering ED with C/O Right Upper Quadrant Pain x1 week    Hospital F/U     S/P 5/23 Robotic Cholecystectomy with Cholangiogram, with Core Liver Biopsy by Dr. Mock. Completed Post-op visit with surgeons office yesterday 6/4 [Gigi Duran PA].       HPI:   Lennie Richards is a 68 year old female who presents for TCM.    Admitted to [facility] [place] 5/21-5/25 for abdominal pain iso cholelithiasis/choledocholithiasis w/dilated CBD on outpatient MRCP. Patient s/p ERCP 5/22/24 w/sphincterotomy and balloon sweep and lap marquise w/liver biopsy on 5/23/24.    Patient tolerated procedures well, no changes were made to medications. Had her post-op visit with surgery yesterday. Having bowel movements, not taking narcotics, healing well, having some pain at surgical sites which is improving. Liver biopsy w/steatosis, no fibrosis.    Wt Readings from Last 6 Encounters:   06/05/24 182 lb (82.6 kg)   05/23/24 185 lb (83.9 kg)   05/17/24 183 lb (83 kg)   04/24/24 186 lb (84.4 kg)   08/01/23 180 lb (81.6 kg)   05/23/23 177 lb 3.2 oz (80.4 kg)     Body mass index is 29.38 kg/m².     Current Outpatient Medications   Medication Sig Dispense Refill    HYDROcodone-acetaminophen 5-325 MG Oral Tab Take 1 tablet by mouth every 4 (four) hours as needed for Pain. 10 tablet 0    Phentermine HCl 30 MG Oral Cap Take 1 capsule (30 mg total) by mouth every morning. 30 capsule 1    estradiol 0.025 MG/24HR Transdermal Patch Biweekly APPLY 1 PATCH TOPICALLY TO THE SKIN 2 TIMES A WEEK 8 patch 11    OMEPRAZOLE 40 MG Oral Capsule Delayed Release TAKE 1 CAPSULE BY MOUTH EVERY MORNING AND EVERY NIGHT AT BEDTIME (Patient taking differently: Take 1 capsule (40 mg total) by mouth every morning.)  180 capsule 0    escitalopram 20 MG Oral Tab Take 1 tablet (20 mg total) by mouth daily. (Patient taking differently: Take 1 tablet (20 mg total) by mouth at bedtime.) 90 tablet 3    amLODIPine 5 MG Oral Tab Take 1 tablet (5 mg total) by mouth daily. (Patient taking differently: Take 1 tablet (5 mg total) by mouth at bedtime.) 90 tablet 3    LUTEIN OR Take by mouth daily.      Bilberry, Vaccinium myrtillus, (BILBERRY OR) Take by mouth daily.      DHA-EPA-Coenzyme Q10-Vitamin E (COQ-10 & FISH OIL OR) Take 1 tablet by mouth daily.      Glucosamine-Chondroitin (GLUCOSAMINE CHONDR COMPLEX OR) Take 1 tablet by mouth daily.      Multiple Vitamin Oral Tab Take 1 tablet by mouth daily.      Cholecalciferol (VITAMIN D) 1000 UNITS Oral Tab Take  by mouth.      Calcium Carbonate-Vitamin D (OSCAL-500) 500-200 MG-UNIT Oral Tab Take 1 tablet by mouth daily.        Past Medical History:    Acne rosacea    Acute closed-angle glaucoma, right    Calculus of kidney    Degenerative joint disease (DJD) of lumbar spine    xray lumbar 12/11, MRI lumbar 1/12-Dr Gant-DJD january L4-5; lumbar injections x2 in 2017 Dr Tello for R sciatica    Depression    treated by gyn after hysterectomy    DJD (degenerative joint disease), cervical    cervical spine xray -9/09-DJD and DDD    Esophageal reflux    Hearing impairment    NO HEARING AIDS    High blood pressure    High cholesterol    Hyperlipidemia    Hypertension, essential    Kidney stone    passed stone in 2005, episode prior to 2005, had cysto to remove stone.     Left knee DJD    Dr Ledesma gel injection    Osteoarthritis    PONV (postoperative nausea and vomiting)    Screening for heart disease    CT heart calcium score 9/9/19 result 16.2.    Sensorineural hearing loss    audiologist Ritu Cazares    Visual impairment    GLASSES      Past Surgical History:   Procedure Laterality Date    Colonoscopy  06/2018    Foot surgery  2009    bunionectomy    Glaucoma surgery Bilateral 11/2021    laser      Hysterectomy  2005    hyst and bso-done in [place] for bleeding fibroids    Laser surgery of eye Bilateral 2021    Dr Brandon for glaucoma both eyes.    Repair rotator cuff,acute Left 2016    Dr Ledesma      Family History   Problem Relation Age of Onset    Cancer Father 69         age 69 lung cancer (smoker)    Heart Disorder Mother 79         age 79 after thoracic aortic aneurysm    Hypertension Mother     Other (leukemia) Mother 70        myelolymphocytic leukemia    Alcohol and Other Disorders Associated Brother         cirrhosis    Hypertension Brother     Other (no children) Other     Stroke Maternal Grandmother          age 56      Social History:   Social History     Socioeconomic History    Marital status:    Tobacco Use    Smoking status: Former     Types: Cigarettes    Smokeless tobacco: Never   Vaping Use    Vaping status: Never Used   Substance and Sexual Activity    Alcohol use: Yes     Alcohol/week: 2.0 standard drinks of alcohol     Types: 2 Glasses of wine per week     Comment: 2 DRINKS A DAY    Drug use: No   Other Topics Concern    Caffeine Concern Yes     Comment: Coffee 2 cups daily    Reaction to local anesthetic No     Social Determinants of Health     Financial Resource Strain: Low Risk  (2024)    Financial Resource Strain     Difficulty of Paying Living Expenses: Not very hard     Med Affordability: No   Food Insecurity: No Food Insecurity (2024)    Food Insecurity     Food Insecurity: Never true   Transportation Needs: No Transportation Needs (2024)    Transportation Needs     Lack of Transportation: No   Housing Stability: Low Risk  (2024)    Housing Stability     Housing Instability: No          REVIEW OF SYSTEMS:   GENERAL: feels well otherwise  GI: mild pain at incision sites    EXAM:   /80   Pulse 86   Ht 5' 6\" (1.676 m)   Wt 182 lb (82.6 kg)   SpO2 97%   BMI 29.38 kg/m²     GENERAL: well developed, well nourished, in no apparent  distress  NEURO: A&O x 3, moves all 4 extremities spontaneously      ASSESSMENT AND PLAN:   Lennie Richards is a 68 year old female who presents for TCM.    Choledocholithiasis  - s/p ERCP w/sphincterotomy and balloon sweep w/GI 5/22    Acute on chronic cholecystitis  - s/p lap marquise 5/23/24    RTC as previously scheduled 8/12/24 with labs priori or sooner PRN.    For E/M code - 30 minutes spent reviewing performing chart review, obtaining a history, performing a physical exam, reviewing the assessment/plan, placing orders, and completing documentation.     Lesa Castellon DO  6/5/2024  10:16 AM

## 2024-06-23 DIAGNOSIS — Z78.9 WEIGHT LOSS ADVISED: ICD-10-CM

## 2024-06-24 RX ORDER — PHENTERMINE HYDROCHLORIDE 15 MG/1
15 CAPSULE ORAL EVERY MORNING
Qty: 30 CAPSULE | Refills: 0 | OUTPATIENT
Start: 2024-06-24

## 2024-06-24 NOTE — TELEPHONE ENCOUNTER
Dose changed in [facility]    Current refill request refused due to refill is either a duplicate request or has active refills at the pharmacy.  Check previous templates.    Requested Prescriptions     Refused Prescriptions Disp Refills    PHENTERMINE HCL 15 MG Oral Cap [Pharmacy Med Name: PHENTERMINE HCL 15MG CAPSULES] 30 capsule 0     Sig: TAKE 1 CAPSULE(15 MG) BY MOUTH EVERY MORNING     Refused By: TARCIE GUZMAN     Reason for Refusal: Refill not appropriate

## 2024-07-22 ENCOUNTER — HOSPITAL ENCOUNTER (OUTPATIENT)
Dept: NUCLEAR MEDICINE | Facility: HOSPITAL | Age: 69
Discharge: HOME | End: 2024-07-22
Attending: INTERNAL MEDICINE
Payer: MEDICARE

## 2024-07-22 ENCOUNTER — HOSPITAL ENCOUNTER (OUTPATIENT)
Dept: CV DIAGNOSTICS | Facility: HOSPITAL | Age: 69
Discharge: HOME | End: 2024-07-22
Attending: INTERNAL MEDICINE
Payer: MEDICARE

## 2024-07-22 DIAGNOSIS — R94.31 ABNORMAL EKG: ICD-10-CM

## 2024-07-22 LAB
% OF MAX PREDICTED HR: 100 %
MAX DIASTOLIC BP: 80 MMHG
MAX HEART RATE: 86 BPM
MAX PREDICTED HEART RATE: 150 BPM
MAX SYSTOLIC BP: 158 MMHG
MAX WORK LOAD: 10

## 2024-07-22 PROCEDURE — 93306 TTE W/DOPPLER COMPLETE: CPT | Performed by: INTERNAL MEDICINE

## 2024-07-22 PROCEDURE — 93018 CV STRESS TEST I&R ONLY: CPT | Performed by: INTERNAL MEDICINE

## 2024-07-22 PROCEDURE — 93016 CV STRESS TEST SUPVJ ONLY: CPT | Performed by: INTERNAL MEDICINE

## 2024-07-22 PROCEDURE — 78452 HT MUSCLE IMAGE SPECT MULT: CPT | Performed by: INTERNAL MEDICINE

## 2024-07-22 PROCEDURE — 93017 CV STRESS TEST TRACING ONLY: CPT | Performed by: INTERNAL MEDICINE

## 2024-07-22 RX ORDER — REGADENOSON 0.08 MG/ML
INJECTION, SOLUTION INTRAVENOUS
Status: COMPLETED
Start: 2024-07-22 | End: 2024-07-22

## 2024-07-22 RX ADMIN — REGADENOSON 0.4 MG: 0.08 INJECTION, SOLUTION INTRAVENOUS at 11:15:00

## 2024-07-23 ENCOUNTER — TELEPHONE (OUTPATIENT)
Dept: INTERNAL MEDICINE CLINIC | Facility: CLINIC | Age: 69
End: 2024-07-23

## 2024-07-23 NOTE — TELEPHONE ENCOUNTER
Called patient with results of stress, TTE. Recommended discontinuation of phentermine. Repeat TTE in 3 years or sooner PRN for mild AI.

## 2024-07-29 ENCOUNTER — TELEPHONE (OUTPATIENT)
Dept: INTERNAL MEDICINE CLINIC | Facility: CLINIC | Age: 69
End: 2024-07-29

## 2024-07-30 NOTE — TELEPHONE ENCOUNTER
To Moon: recently stopped her phentermine for borderline pulm htn on TTE. She has been interested in glp-1s for weight loss in past but had concerns about coverage. Please call pt to discuss her options.    Thank you!

## 2024-08-02 NOTE — TELEPHONE ENCOUNTER
Spoke to pt - lengthy discussion about past success with [facility];  BMI 29; eating habits;  lack of exercise (used to walk 5 miles now walks dog only)  GLP ADRs, cautions, indications, dosing, cost, insurance coverage reviewed;   recommend she call the insurance to check coverage/copay  Pt will f/u and think it over and call me back if wishing to proceed

## 2024-08-12 ENCOUNTER — OFFICE VISIT (OUTPATIENT)
Dept: INTERNAL MEDICINE CLINIC | Facility: CLINIC | Age: 69
End: 2024-08-12
Payer: MEDICARE

## 2024-08-12 VITALS
DIASTOLIC BLOOD PRESSURE: 82 MMHG | HEART RATE: 82 BPM | BODY MASS INDEX: 29.41 KG/M2 | SYSTOLIC BLOOD PRESSURE: 136 MMHG | OXYGEN SATURATION: 97 % | WEIGHT: 183 LBS | HEIGHT: 66 IN

## 2024-08-12 DIAGNOSIS — Z12.31 ENCOUNTER FOR SCREENING MAMMOGRAM FOR MALIGNANT NEOPLASM OF BREAST: ICD-10-CM

## 2024-08-12 DIAGNOSIS — Z00.00 MEDICARE ANNUAL WELLNESS VISIT, SUBSEQUENT: Primary | ICD-10-CM

## 2024-08-12 RX ORDER — MELOXICAM 15 MG/1
15 TABLET ORAL DAILY
COMMUNITY
Start: 2024-08-07

## 2024-08-12 RX ORDER — ESCITALOPRAM OXALATE 20 MG/1
20 TABLET ORAL DAILY
Qty: 90 TABLET | Refills: 3 | Status: SHIPPED | OUTPATIENT
Start: 2024-08-12

## 2024-08-12 RX ORDER — ESTRADIOL 0.03 MG/D
1 FILM, EXTENDED RELEASE TRANSDERMAL
Qty: 8 PATCH | Refills: 11 | Status: SHIPPED | OUTPATIENT
Start: 2024-08-12

## 2024-08-12 RX ORDER — AMLODIPINE BESYLATE 5 MG/1
5 TABLET ORAL DAILY
Qty: 90 TABLET | Refills: 3 | Status: SHIPPED | OUTPATIENT
Start: 2024-08-12

## 2024-08-12 NOTE — PROGRESS NOTES
Subjective:   Lennie Richards is a 68 year old female who presents for a Subsequent Annual Wellness visit (Pt already had Initial Annual Wellness) and scheduled follow up of multiple significant but stable problems.     LOV 6/5/24.    C/o L sided sciatica, seeing ortho Dr. Ledesma. S/p steroids now on meloxicam and doing therapy twice a week.    History/Other:   Fall Risk Assessment:   She has been screened for Falls and is low risk.      Cognitive Assessment:   She had a completely normal cognitive assessment - see flowsheet entries     Functional Ability/Status:   Lennie Richrads has some abnormal functions as listed below:  She has Hearing problems based on screening of functional status.She has Vision problems based on screening of functional status.       Depression Screening (PHQ):  PHQ-2 SCORE: 0  , done 8/12/2024   Last [facility] Suicide Screening on 8/12/2024 was No Risk.     5 minutes spent screening and counseling for depression    Advanced Directives:   She does have a Living Will but we do NOT have it on file in Epic.    She has a Power of  for Health Care on file in [facility].  Patient has Advance Care Planning documents present in EMR. Reviewed documents with patient (and family/surrogate if present).      Patient Active Problem List   Diagnosis    Depression    DJD (degenerative joint disease)    Hypertension, essential    Hypercholesterolemia    Sensorineural hearing loss, bilateral    Choledocholithiasis    Biliary calculus of other site with obstruction     Allergies:  She is allergic to bactrim [sulfamethoxazole w/trimethoprim].    Current Medications:  Outpatient Medications Marked as Taking for the 8/12/24 encounter (Office Visit) with Lesa Castellon,    Medication Sig    Meloxicam 15 MG Oral Tab Take 1 tablet (15 mg total) by mouth daily.    amLODIPine 5 MG Oral Tab Take 1 tablet (5 mg total) by mouth daily.    escitalopram 20 MG Oral Tab Take 1 tablet (20 mg total) by  mouth daily.    estradiol 0.025 MG/24HR Transdermal Patch Biweekly Place 1 patch onto the skin twice a week.    OMEPRAZOLE 40 MG Oral Capsule Delayed Release TAKE 1 CAPSULE BY MOUTH EVERY MORNING AND EVERY NIGHT AT BEDTIME (Patient taking differently: Take 1 capsule (40 mg total) by mouth every morning.)    DHA-EPA-Coenzyme Q10-Vitamin E (COQ-10 & FISH OIL OR) Take 1 tablet by mouth daily.    Glucosamine-Chondroitin (GLUCOSAMINE CHONDR COMPLEX OR) Take 1 tablet by mouth daily.    Multiple Vitamin Oral Tab Take 1 tablet by mouth daily.    Cholecalciferol (VITAMIN D) 1000 UNITS Oral Tab Take  by mouth.    Calcium Carbonate-Vitamin D (OSCAL-500) 500-200 MG-UNIT Oral Tab Take 1 tablet by mouth daily.       Medical History:  She  has a past medical history of Acne rosacea, Acute closed-angle glaucoma, right, Calculus of kidney, Degenerative joint disease (DJD) of lumbar spine (2011), Depression (2005), DJD (degenerative joint disease), cervical (2009), Esophageal reflux, Hearing impairment, High blood pressure, High cholesterol, Hyperlipidemia (2015), Hypertension, essential (2018), Kidney stone (2005), Left knee DJD (2018), Osteoarthritis, PONV (postoperative nausea and vomiting), Screening for heart disease (2019), Sensorineural hearing loss (2018), and Visual impairment.    Surgical History:  She  has a past surgical history that includes hysterectomy (2005); foot surgery (2009); repair rotator cuff,acute (Left, 05/2016); colonoscopy (06/2018); laser surgery of eye (Bilateral, 05/2021); glaucoma surgery (Bilateral, 11/2021); and laparoscopic cholecystectomy (05/23/2024).     Family History:  Her family history includes Alcohol and Other Disorders Associated in her brother; Cancer (age of onset: 69) in her father; Heart Disorder (age of onset: 79) in her mother; Hypertension in her brother and mother; Stroke in her maternal grandmother; leukemia (age of onset: 70) in her mother; no children in an other family  member.    Social History:  She  reports that she has quit smoking. Her smoking use included cigarettes. She has never used smokeless tobacco. She reports current alcohol use of about 2.0 standard drinks of alcohol per week. She reports that she does not use drugs.    Tobacco:  She smoked tobacco in the past but quit greater than 12 months ago.  Social History     Tobacco Use   Smoking Status Former    Types: Cigarettes   Smokeless Tobacco Never        CAGE Alcohol Screen:   CAGE screening score of 0 on 8/12/2024, showing low risk of alcohol abuse.      Patient Care Team:  Lesa Castellon DO as PCP - General (Internal Medicine)  Mukesh Cazares MD (GASTROENTEROLOGY)    Review of Systems  GENERAL: feels well otherwise  SKIN: denies any unusual skin lesions  EYES: denies blurred vision or double vision  HEENT: denies nasal congestion, sinus pain or ST  LUNGS: denies shortness of breath with exertion  CARDIOVASCULAR: denies chest pain on exertion  GI: denies abdominal pain, denies heartburn  : denies dysuria, hematuria  MUSCULOSKELETAL: denies back pain  NEURO: denies headaches    Objective:   Physical Exam  General Appearance:  Alert, cooperative, no distress, appears stated age   Head:  Normocephalic, without obvious abnormality, atraumatic   Eyes:  PERRL, conjunctiva/corneas clear, EOM's intact both eyes   Ears:  Normal TM's and external ear canals, both ears   Nose: Nares normal, septum midline,mucosa normal, no drainage or sinus tenderness   Throat: Lips, mucosa, and tongue normal; teeth and gums normal   Neck: Supple, symmetrical, trachea midline, no adenopathy;  thyroid: not enlarged, symmetric, no tenderness/mass/nodules; no carotid bruit or JVD   Breast:   No palpable masses, skin dimpling, nipple retraction, or axillary LAD b/l   Lungs:   Clear to auscultation bilaterally, respirations unlabored   Heart:  Regular rate and rhythm, S1 and S2 normal, no murmur, rub, or gallop   Abdomen:   Soft,  non-tender, bowel sounds active all four quadrants,  no masses, no organomegaly   Pelvic: Deferred   Extremities: Extremities normal, atraumatic, no cyanosis or edema   Pulses: 2+ and symmetric   Skin: Skin color, texture, turgor normal, no rashes or lesions   Lymph nodes: Cervical, supraclavicular, and axillary nodes normal   Neurologic: Normal       /82   Pulse 82   Ht 5' 6\" (1.676 m)   Wt 183 lb (83 kg)   SpO2 97%   BMI 29.54 kg/m²  Estimated body mass index is 29.54 kg/m² as calculated from the following:    Height as of this encounter: 5' 6\" (1.676 m).    Weight as of this encounter: 183 lb (83 kg).    Medicare Hearing Assessment:   Hearing Screening    Screening Method: Whisper Test  Whisper Test Result: Pass               Assessment & Plan:   Lennie Richards is a 68 year old female who presents for a Medicare Assessment.     Medicare annual wellness visit, subsequent (Primary)  - advised cbc, cmp, tsh, lipid panel to be obtained fasting per order 6/5/24  -     Urinalysis with Culture Reflex; Future; Expected date: 08/12/2024    Encounter for screening mammogram for malignant neoplasm of breast  -     [facility] 2D+3D SCREENING BILAT (CPT=77067/44314); Future; Expected date: 08/12/2024    Other orders  -     amLODIPine Besylate; Take 1 tablet (5 mg total) by mouth daily.  Dispense: 90 tablet; Refill: 3  -     Escitalopram Oxalate; Take 1 tablet (20 mg total) by mouth daily.  Dispense: 90 tablet; Refill: 3  -     Estradiol; Place 1 patch onto the skin twice a week.  Dispense: 8 patch; Refill: 11    Hypertension  - Controlled on current regimen:   -  amlodipine 5 mg daily     Hypercholesterolemia  - CT calcium score 9/9/19: 16.2  - The 10-year ASCVD risk score (Reymundo DK, et al., 2019) is: 14.1%    Values used to calculate the score:      Age: 68 years      Sex: Female      Is Non- : No      Diabetic: No      Tobacco smoker: No      Systolic Blood Pressure: 136 mmHg       Is BP treated: Yes      HDL Cholesterol: 73 mg/dL      Total Cholesterol: 239 mg/dL  - nuclear stress 7/22/24 wnl  - repeat lipids as above, recommend statin, risks/benefits discussed     Depression, mild  - lexapro 20 mg daily     L knee OA  - X-ray L knee 10/26/18: small L suprapatellar effusion  - seen by Dr. Ledesma s/p gel shot 12/2018 which helped    Low back pain  - managed by ortho Dr. Ledesma     Dermatofibroma L leg  - removed 2019 Dr Durán, it returned and Dr. Benavides removed it     Hearing difficulty  - audiologist Ritu Cazares 5/21/18: sensorineural HL & candidate for HA's-not yet gotten hearing aides    Vaginal dryness  - estradiol patch, risks including VTE and breast cancer discussed, patient is aware of and accepts risks    Healthcare Maintenance  Cancer Screenings:  - Breast: repeat mammogram ordered  - Cervical: age >65, s/p  Dr. Halley JUÁREZ rx estrogen patch  - Colon: 6/14/18, repeat in 7 years (2025)    Vaccines:  - Tdap: 6/3/20  - Shingles: zostavax, shingrix recommended  - Pneumonia: prevnar 13 5/31/19, pneumovax 23 6/3/20  - Flu: recommend yearly  - COVID-19: x5    Misc:  - DEXA: normal 9/12/23  - Advanced directives: discussed, has documents in Epic    RTC in 1 year or sooner PRN.    Labs ordered as above. Informed patient to expect messaging only if the labs are abnormal via patient preferred method of communication (MyChart).    Overall 60 minutes was spent on this encounter. 45 minutes spent reviewing, providing care coordination, and documentation of the acute/chronic conditions as listed above. 15 minutes was spent reviewing elements of a AWV and providing age appropriate screenings.     The patient indicates understanding of these issues and agrees to the plan.  Reinforced healthy diet, lifestyle, and exercise.      No follow-ups on file.     Lesa Castellon DO, 8/12/2024     Supplementary Documentation:   General Health:  In the past six months, have you lost more than 10 pounds  without trying?: 2 - No  Has your appetite been poor?: No  Type of Diet: Balanced  How does the patient maintain a good energy level?: Daily Walks  How would you describe your daily physical activity?: Light  How would you describe your current health state?: Good  How do you maintain positive mental well-being?: Social Interaction;Puzzles;Games;Visiting Friends;Visiting Family  On a scale of 0 to 10, with 0 being no pain and 10 being severe pain, what is your pain level?: 0 - (None)  In the past six months, have you experienced urine leakage?: 1-Yes  At any time do you feel concerned for the safety/well-being of yourself and/or your children, in your home or elsewhere?: No  Have you had any immunizations at another office such as Influenza, Hepatitis B, Tetanus, or Pneumococcal?: No    Health Maintenance   Topic Date Due    Zoster Vaccines (2 of 3) 12/04/2014    COVID-19 Vaccine (6 - 2023-24 season) 09/01/2023    Annual Physical  08/01/2024    Mammogram  09/12/2024    Influenza Vaccine (1) 10/01/2024    Colorectal Cancer Screening  11/09/2028    DEXA Scan  Completed    Annual Depression Screening  Completed    Fall Risk Screening (Annual)  Completed    Pneumococcal Vaccine: 65+ Years  Completed

## 2024-08-14 ENCOUNTER — LAB ENCOUNTER (OUTPATIENT)
Dept: LAB | Age: 69
End: 2024-08-14
Attending: INTERNAL MEDICINE
Payer: MEDICARE

## 2024-08-14 DIAGNOSIS — Z00.00 MEDICARE ANNUAL WELLNESS VISIT, SUBSEQUENT: ICD-10-CM

## 2024-08-14 DIAGNOSIS — E78.00 HYPERCHOLESTEREMIA: ICD-10-CM

## 2024-08-14 DIAGNOSIS — R79.89 ELEVATED LFTS: ICD-10-CM

## 2024-08-14 LAB
ALBUMIN SERPL-MCNC: 4.2 G/DL (ref 3.2–4.8)
ALBUMIN/GLOB SERPL: 1.6 {RATIO} (ref 1–2)
ALP LIVER SERPL-CCNC: 70 U/L
ALT SERPL-CCNC: 24 U/L
ANION GAP SERPL CALC-SCNC: 6 MMOL/L (ref 0–18)
AST SERPL-CCNC: 17 U/L (ref ?–34)
BASOPHILS # BLD AUTO: 0.05 X10(3) UL (ref 0–0.2)
BASOPHILS NFR BLD AUTO: 0.7 %
BILIRUB SERPL-MCNC: 0.5 MG/DL (ref 0.2–1.1)
BILIRUB UR QL: NEGATIVE
BUN BLD-MCNC: 15 MG/DL (ref 9–23)
BUN/CREAT SERPL: 17.4 (ref 10–20)
CALCIUM BLD-MCNC: 9.2 MG/DL (ref 8.7–10.4)
CHLORIDE SERPL-SCNC: 105 MMOL/L (ref 98–112)
CHOLEST SERPL-MCNC: 232 MG/DL (ref ?–200)
CLARITY UR: CLEAR
CO2 SERPL-SCNC: 28 MMOL/L (ref 21–32)
COLOR UR: YELLOW
CREAT BLD-MCNC: 0.86 MG/DL
DEPRECATED RDW RBC AUTO: 46.9 FL (ref 35.1–46.3)
EGFRCR SERPLBLD CKD-EPI 2021: 73 ML/MIN/1.73M2 (ref 60–?)
EOSINOPHIL # BLD AUTO: 0.13 X10(3) UL (ref 0–0.7)
EOSINOPHIL NFR BLD AUTO: 1.7 %
ERYTHROCYTE [DISTWIDTH] IN BLOOD BY AUTOMATED COUNT: 13.3 % (ref 11–15)
FASTING PATIENT LIPID ANSWER: YES
FASTING STATUS PATIENT QL REPORTED: YES
GLOBULIN PLAS-MCNC: 2.6 G/DL (ref 2–3.5)
GLUCOSE BLD-MCNC: 89 MG/DL (ref 70–99)
GLUCOSE UR-MCNC: NORMAL MG/DL
HCT VFR BLD AUTO: 45.1 %
HDLC SERPL-MCNC: 74 MG/DL (ref 40–59)
HGB BLD-MCNC: 15 G/DL
HGB UR QL STRIP.AUTO: NEGATIVE
IMM GRANULOCYTES # BLD AUTO: 0.03 X10(3) UL (ref 0–1)
IMM GRANULOCYTES NFR BLD: 0.4 %
KETONES UR-MCNC: NEGATIVE MG/DL
LDLC SERPL CALC-MCNC: 135 MG/DL (ref ?–100)
LEUKOCYTE ESTERASE UR QL STRIP.AUTO: NEGATIVE
LYMPHOCYTES # BLD AUTO: 2.32 X10(3) UL (ref 1–4)
LYMPHOCYTES NFR BLD AUTO: 30.9 %
MCH RBC QN AUTO: 31.6 PG (ref 26–34)
MCHC RBC AUTO-ENTMCNC: 33.3 G/DL (ref 31–37)
MCV RBC AUTO: 94.9 FL
MONOCYTES # BLD AUTO: 0.69 X10(3) UL (ref 0.1–1)
MONOCYTES NFR BLD AUTO: 9.2 %
NEUTROPHILS # BLD AUTO: 4.29 X10 (3) UL (ref 1.5–7.7)
NEUTROPHILS # BLD AUTO: 4.29 X10(3) UL (ref 1.5–7.7)
NEUTROPHILS NFR BLD AUTO: 57.1 %
NITRITE UR QL STRIP.AUTO: NEGATIVE
NONHDLC SERPL-MCNC: 158 MG/DL (ref ?–130)
OSMOLALITY SERPL CALC.SUM OF ELEC: 288 MOSM/KG (ref 275–295)
PH UR: 6.5 [PH] (ref 5–8)
PLATELET # BLD AUTO: 271 10(3)UL (ref 150–450)
POTASSIUM SERPL-SCNC: 4 MMOL/L (ref 3.5–5.1)
PROT SERPL-MCNC: 6.8 G/DL (ref 5.7–8.2)
RBC # BLD AUTO: 4.75 X10(6)UL
SODIUM SERPL-SCNC: 139 MMOL/L (ref 136–145)
SP GR UR STRIP: 1.02 (ref 1–1.03)
TRIGL SERPL-MCNC: 133 MG/DL (ref 30–149)
TSI SER-ACNC: 2.58 MIU/ML (ref 0.55–4.78)
UROBILINOGEN UR STRIP-ACNC: NORMAL
VLDLC SERPL CALC-MCNC: 24 MG/DL (ref 0–30)
WBC # BLD AUTO: 7.5 X10(3) UL (ref 4–11)

## 2024-08-14 PROCEDURE — 84443 ASSAY THYROID STIM HORMONE: CPT

## 2024-08-14 PROCEDURE — 80053 COMPREHEN METABOLIC PANEL: CPT

## 2024-08-14 PROCEDURE — 81003 URINALYSIS AUTO W/O SCOPE: CPT

## 2024-08-14 PROCEDURE — 85025 COMPLETE CBC W/AUTO DIFF WBC: CPT

## 2024-08-14 PROCEDURE — 80061 LIPID PANEL: CPT

## 2024-08-14 PROCEDURE — 36415 COLL VENOUS BLD VENIPUNCTURE: CPT

## 2024-08-19 ENCOUNTER — TELEPHONE (OUTPATIENT)
Dept: INTERNAL MEDICINE CLINIC | Facility: CLINIC | Age: 69
End: 2024-08-19

## 2024-08-19 DIAGNOSIS — E78.5 HYPERLIPIDEMIA, UNSPECIFIED HYPERLIPIDEMIA TYPE: Primary | ICD-10-CM

## 2024-08-19 RX ORDER — ATORVASTATIN CALCIUM 10 MG/1
10 TABLET, FILM COATED ORAL NIGHTLY
Qty: 90 TABLET | Refills: 3 | Status: SHIPPED | OUTPATIENT
Start: 2024-08-19

## 2024-10-21 ENCOUNTER — HOSPITAL ENCOUNTER (OUTPATIENT)
Dept: MAMMOGRAPHY | Age: 69
Discharge: HOME | End: 2024-10-21
Attending: INTERNAL MEDICINE
Payer: MEDICARE

## 2024-10-21 DIAGNOSIS — Z12.31 ENCOUNTER FOR SCREENING MAMMOGRAM FOR MALIGNANT NEOPLASM OF BREAST: ICD-10-CM

## 2024-10-21 PROCEDURE — 77067 SCR MAMMO BI INCL CAD: CPT | Performed by: INTERNAL MEDICINE

## 2024-10-21 PROCEDURE — 77063 BREAST TOMOSYNTHESIS BI: CPT | Performed by: INTERNAL MEDICINE

## 2024-10-30 ENCOUNTER — HOSPITAL ENCOUNTER (OUTPATIENT)
Dept: ULTRASOUND IMAGING | Facility: HOSPITAL | Age: 69
Discharge: HOME | End: 2024-10-30
Attending: INTERNAL MEDICINE
Payer: MEDICARE

## 2024-10-30 ENCOUNTER — HOSPITAL ENCOUNTER (OUTPATIENT)
Dept: MAMMOGRAPHY | Facility: HOSPITAL | Age: 69
Discharge: HOME | End: 2024-10-30
Attending: INTERNAL MEDICINE
Payer: MEDICARE

## 2024-10-30 ENCOUNTER — TELEPHONE (OUTPATIENT)
Dept: INTERNAL MEDICINE CLINIC | Facility: CLINIC | Age: 69
End: 2024-10-30

## 2024-10-30 DIAGNOSIS — R92.8 ABNORMAL MAMMOGRAM: ICD-10-CM

## 2024-10-30 DIAGNOSIS — R92.8 ABNORMAL SCREENING MAMMOGRAM: Primary | ICD-10-CM

## 2024-10-30 PROCEDURE — 76642 ULTRASOUND BREAST LIMITED: CPT | Performed by: INTERNAL MEDICINE

## 2024-10-30 PROCEDURE — 77061 BREAST TOMOSYNTHESIS UNI: CPT | Performed by: INTERNAL MEDICINE

## 2024-10-30 PROCEDURE — 77065 DX MAMMO INCL CAD UNI: CPT | Performed by: INTERNAL MEDICINE

## 2024-10-30 NOTE — TELEPHONE ENCOUNTER
To nursing staff, please relay the following to Lennie Richards:    Please advise patient that orders were placed for R breast diagnostic mammogram and ultrasound to obtain in 6 months based on radiologist recommendations.    Thank you!

## 2024-12-06 ENCOUNTER — TELEPHONE (OUTPATIENT)
Dept: INTERNAL MEDICINE CLINIC | Facility: CLINIC | Age: 69
End: 2024-12-06

## 2024-12-06 NOTE — TELEPHONE ENCOUNTER
As FYI to  - called patient who noticed a lump left side of neck under jaw- visible to the eye.when touched it is painful. Patient did not have any Upper Respiratory Infection (URI) - RN advised to be evaluated at  - she agrees F/U 12/9

## 2024-12-06 NOTE — TELEPHONE ENCOUNTER
Pt. Called stating she has a lump on the lft side of her neck just under the jaw.  She noticed it last night.  She has been using warm compresses and taking Ibuprofen which as helped a little, but the lump is still there.  It is also painful at times.  Pt. Is asking for an appt. Today.

## 2025-04-30 ENCOUNTER — HOSPITAL ENCOUNTER (OUTPATIENT)
Dept: MAMMOGRAPHY | Facility: HOSPITAL | Age: 70
Discharge: HOME | End: 2025-04-30
Attending: INTERNAL MEDICINE
Payer: MEDICARE

## 2025-04-30 ENCOUNTER — TELEPHONE (OUTPATIENT)
Dept: INTERNAL MEDICINE CLINIC | Facility: CLINIC | Age: 70
End: 2025-04-30

## 2025-04-30 ENCOUNTER — HOSPITAL ENCOUNTER (OUTPATIENT)
Dept: ULTRASOUND IMAGING | Facility: HOSPITAL | Age: 70
Discharge: HOME | End: 2025-04-30
Attending: INTERNAL MEDICINE
Payer: MEDICARE

## 2025-04-30 DIAGNOSIS — Z12.31 ENCOUNTER FOR SCREENING MAMMOGRAM FOR MALIGNANT NEOPLASM OF BREAST: Primary | ICD-10-CM

## 2025-04-30 DIAGNOSIS — R92.8 ABNORMAL SCREENING MAMMOGRAM: ICD-10-CM

## 2025-04-30 PROCEDURE — 77065 DX MAMMO INCL CAD UNI: CPT | Performed by: INTERNAL MEDICINE

## 2025-04-30 PROCEDURE — 77061 BREAST TOMOSYNTHESIS UNI: CPT | Performed by: INTERNAL MEDICINE

## 2025-04-30 PROCEDURE — 76642 ULTRASOUND BREAST LIMITED: CPT | Performed by: INTERNAL MEDICINE

## 2025-06-10 RX ORDER — ESTRADIOL 0.03 MG/D
1 FILM, EXTENDED RELEASE TRANSDERMAL
Qty: 8 PATCH | Refills: 11 | Status: SHIPPED | OUTPATIENT
Start: 2025-06-12

## 2025-06-10 NOTE — TELEPHONE ENCOUNTER
Refill request is for a maintenance medication and has met the criteria specified in the Ambulatory Medication Refill Standing Order for eligibility, visits, laboratory, alerts and was sent to the requested pharmacy.    Requested Prescriptions     Signed Prescriptions Disp Refills    estradiol 0.025 MG/24HR Transdermal Patch Biweekly 8 patch 11     Sig: Place 1 patch onto the skin twice a week.     Authorizing Provider: JULI NIXON     Ordering User: LEODAN SCHNEIDER

## 2025-07-31 RX ORDER — ATORVASTATIN CALCIUM 10 MG/1
10 TABLET, FILM COATED ORAL NIGHTLY
Qty: 90 TABLET | Refills: 0 | Status: SHIPPED | OUTPATIENT
Start: 2025-07-31

## 2025-08-11 ENCOUNTER — OFFICE VISIT (OUTPATIENT)
Dept: SURGERY | Facility: CLINIC | Age: 70
End: 2025-08-11

## 2025-08-11 VITALS
DIASTOLIC BLOOD PRESSURE: 84 MMHG | SYSTOLIC BLOOD PRESSURE: 142 MMHG | BODY MASS INDEX: 31.68 KG/M2 | HEART RATE: 75 BPM | HEIGHT: 65 IN | WEIGHT: 190.13 LBS | OXYGEN SATURATION: 95 %

## 2025-08-11 DIAGNOSIS — E66.9 OBESITY (BMI 30-39.9): ICD-10-CM

## 2025-08-11 DIAGNOSIS — I10 HYPERTENSION, ESSENTIAL: Primary | ICD-10-CM

## 2025-08-11 DIAGNOSIS — E78.5 DYSLIPIDEMIA: ICD-10-CM

## 2025-08-11 PROCEDURE — 99204 OFFICE O/P NEW MOD 45 MIN: CPT | Performed by: NURSE PRACTITIONER

## (undated) DEVICE — REM POLYHESIVE ADULT PATIENT RETURN ELECTRODE: Brand: VALLEYLAB

## (undated) DEVICE — D.A.S.H. DOME TIP DOUBLE LUMEN SPHINCTEROTOME: Brand: D.A.S.H.

## (undated) DEVICE — CATH IV 12GA 3IN ANGIOCATH

## (undated) DEVICE — BLADELESS OBTURATOR: Brand: WECK VISTA

## (undated) DEVICE — [HIGH FLOW INSUFFLATOR,  DO NOT USE IF PACKAGE IS DAMAGED,  KEEP DRY,  KEEP AWAY FROM SUNLIGHT,  PROTECT FROM HEAT AND RADIOACTIVE SOURCES.]: Brand: PNEUMOSURE

## (undated) DEVICE — GLOVE SUR 7.5 SENSICARE PI PIP CRM PWD F

## (undated) DEVICE — 3M™ TEGADERM™ TRANSPARENT FILM DRESSING FRAME STYLE, 1626W, 4 IN X 4-3/4 IN (10 CM X 12 CM), 50/CT 4CT/CASE: Brand: 3M™ TEGADERM™

## (undated) DEVICE — FUSION QUATTRO EXTRACTION BALLOON: Brand: FUSION QUATTRO

## (undated) DEVICE — 3M™ STERI-STRIP™ REINFORCED ADHESIVE SKIN CLOSURES, R1547, 1/2 IN X 4 IN (12 MM X 100 MM), 6 STRIPS/ENVELOPE: Brand: 3M™ STERI-STRIP™

## (undated) DEVICE — PAD POS 36IN DISP SURGYPAD

## (undated) DEVICE — SNAPLOC WIRE GUIDE LOCKING DEVICE: Brand: SNAPLOC

## (undated) DEVICE — OPEN-END URETERAL CATHETER SOF-FLEX: Brand: SOF-FLEX

## (undated) DEVICE — PERMANENT CAUTERY HOOK: Brand: ENDOWRIST

## (undated) DEVICE — COVER,MAYO STAND,STERILE: Brand: MEDLINE

## (undated) DEVICE — Device

## (undated) DEVICE — MAX-CORE® DISPOSABLE CORE BIOPSY INSTRUMENT, 14G X 16CM: Brand: MAX-CORE

## (undated) DEVICE — PAD N ADH 3X4IN COT POLY SFT PERF FLM

## (undated) DEVICE — SOLUTION IRRIG 1000ML 0.9% NACL USP BTL

## (undated) DEVICE — GOWN,SIRUS,FAB REINF,RAGLAN,XL,STERILE: Brand: MEDLINE

## (undated) DEVICE — ADHESIVE LIQ 2/3ML VI MASTISOL

## (undated) DEVICE — COLUMN DRAPE

## (undated) DEVICE — LAP CHOLE: Brand: MEDLINE INDUSTRIES, INC.

## (undated) DEVICE — UNDYED BRAIDED (POLYGLACTIN 910), SYNTHETIC ABSORBABLE SUTURE: Brand: COATED VICRYL

## (undated) DEVICE — TISSUE RETRIEVAL SYSTEM: Brand: INZII RETRIEVAL SYSTEM

## (undated) DEVICE — 3M™ STERI-DRAPE™ INSTRUMENT POUCH 1018L: Brand: STERI-DRAPE™

## (undated) DEVICE — VISUALIZATION SYSTEM: Brand: CLEARIFY

## (undated) DEVICE — CONTAINER SPEC COLL AND TRNSPRT W/ WHT CAP

## (undated) DEVICE — SUCTION CANISTER, 3000CC,SAFELINER: Brand: DEROYAL

## (undated) DEVICE — GOWN,SIRUS,FAB REINF,RAGLAN,L,STERILE: Brand: MEDLINE

## (undated) DEVICE — 3M™ IOBAN™ 2 ANTIMICROBIAL INCISE DRAPE 6650EZ: Brand: IOBAN™ 2

## (undated) DEVICE — INSUFFLATION NEEDLE TO ESTABLISH PNEUMOPERITONEUM.: Brand: INSUFFLATION NEEDLE

## (undated) DEVICE — LARGE HEM-O-LOK CLIP APPLIER: Brand: ENDOWRIST

## (undated) DEVICE — ARM DRAPE

## (undated) DEVICE — DRAPE,UNDRBUT,WHT GRAD PCH,CAPPORT,20/CS: Brand: MEDLINE

## (undated) DEVICE — ACROBAT 2 CALIBRATED TIP WIRE GUIDE: Brand: ACROBAT

## (undated) DEVICE — GAUZE SPONGES: Brand: DEROYAL

## (undated) DEVICE — STANDARD HYPODERMIC NEEDLE,POLYPROPYLENE HUB: Brand: MONOJECT

## (undated) DEVICE — SOLUTION IRRIG 3000ML 0.9% NACL FLX CONT

## (undated) DEVICE — CLIP INT L POLYMER LOK LIG HEM O LOK

## (undated) DEVICE — CANNULA SEAL

## (undated) NOTE — MR AVS SNAPSHOT
[facility]  [address] [place] [id number]  [phone]               Thank you for choosing us for your health care visit with Tabby Erickson MD.  We are glad to serve you and happy to provide you with this summary of your visit.   Guerita escitalopram 10 MG Tabs   Take 10 mg by mouth once daily. Commonly known as:  LEXAPRO           GLUCOSAMINE CHONDR COMPLEX OR   Take 1 tablet by mouth daily.            ibuprofen 200 MG Tabs   Take 3 tablets 3 times a day   Commonly known as:  MOTRIN

## (undated) NOTE — ED AVS SNAPSHOT
[facility] Emergency Department    [address]     [address] [place] [address]    Phone:  [phone]    Fax:  [phone] [facility]   MRN: [id number]    Department:  [facility] Emergency Department   Date of Visit:  1/ service to you, we would appreciate any positive or negative feedback related to the care you received in our emergency department. Please call our [facility] at [phone]. Your Emergency Department team is here to serve you.   You are our top priori I certified that I have received a copy of the aftercare instructions; that these instructions have been explained to me; all questions pertaining to these instructions have been answered in a satisfactory manner.         [address] office, you can view your past visit information in [employer] by going to Visits < Visit Summaries. [employer] questions? Call [phone] for help. [employer] is NOT to be used for urgent needs. For medical emergencies, dial 911.

## (undated) NOTE — LETTER
[facility]  [address], [place], [place]    Authorization for Surgical Operation and Procedure                               I hereby authorize Dr Go Mock, my physician and his/her assistants (if applicable), which may include medical students, residents, and/or fellows, to perform the following surgical operation/ procedure and administer such anesthesia as may be determined necessary by my physician: Operation/Procedure name (s) Robotic Assisted Cholecystectomy with Cholangiogram, Possible Open, Possible Open; Possible Core Liver Biopsy I recognize that during the surgical operation/procedure, unforeseen conditions may necessitate additional or different procedures than those listed above.  I, therefore, further authorize and request that the above-named surgeon, assistants, or designees perform such procedures as are, in their judgment, necessary and desirable.    3.   My surgeon/physician has discussed prior to my surgery the potential benefits, risks and side effects of this procedure; the likelihood of achieving goals; and potential problems that might occur during recuperation.  They also discussed reasonable alternatives to the procedure, including risks, benefits, and side effects related to the alternatives and risks related to not receiving this procedure.  I have had all my questions answered and I acknowledge that no guarantee has been made as to the result that may be obtained.    4.   Should the need arise during my operation/procedure, which includes change of level of care prior to discharge, I also consent to the administration of blood and/or blood products.  Further, I understand that despite careful testing and screening of blood or blood products by collecting agencies, I may still be subject to ill effects as a result of receiving a blood transfusion and/or blood products.  The following are some, but not all, of the potential risks that can occur: fever and  allergic reactions, hemolytic reactions, transmission of diseases such as Hepatitis, AIDS and Cytomegalovirus (CMV) and fluid overload.  In the event that I wish to have an autologous transfusion of my own blood, or a directed donor transfusion, I will discuss this with my physician.  Check only if Refusing Blood or Blood Products  I understand refusal of blood or blood products as deemed necessary by my physician may have serious consequences to my condition to include possible death. I hereby assume responsibility for my refusal and release the hospital, its personnel, and my physicians from any responsibility for the consequences of my refusal.    o  Refuse   5.   I authorize the use of any specimen, organs, tissues, body parts or foreign objects that may be removed from my body during the operation/procedure for diagnosis, research or teaching purposes and their subsequent disposal by hospital authorities.  I also authorize the release of specimen test results and/or written reports to my treating physician on the hospital medical staff or other referring or consulting physicians involved in my care, at the discretion of the Pathologist or my treating physician.    6.   I consent to the photographing or videotaping of the operations or procedures to be performed, including appropriate portions of my body for medical, scientific, or educational purposes, provided my identity is not revealed by the pictures or by descriptive texts accompanying them.  If the procedure has been photographed/videotaped, the surgeon will obtain the original picture, image, videotape or CD.  The hospital will not be responsible for storage, release or maintenance of the picture, image, tape or CD.    7.   I consent to the presence of a  or observers in the operating room as deemed necessary by my physician or their designees.    8.   I recognize that in the event my procedure results in extended X-Ray/fluoroscopy  time, I may develop a skin reaction.    9. If I have a Do Not Attempt Resuscitation (DNAR) order in place, that status will be suspended while in the operating room, procedural suite, and during the recovery period unless otherwise explicitly stated by me (or a person authorized to consent on my behalf). The surgeon or my attending physician will determine when the applicable recovery period ends for purposes of reinstating the DNAR order.  10. Patients having a sterilization procedure: I understand that if the procedure is successful the results will be permanent and it will therefore be impossible for me to inseminate, conceive, or bear children.  I also understand that the procedure is intended to result in sterility, although the result has not been guaranteed.   11. I acknowledge that my physician has explained sedation/analgesia administration to me including the risk and benefits I consent to the administration of sedation/analgesia as may be necessary or desirable in the judgment of my physician.    I CERTIFY THAT I HAVE READ AND FULLY UNDERSTAND THE ABOVE CONSENT TO OPERATION and/or OTHER PROCEDURE.     ____________________________________  _________________________________        ______________________________  Signature of Patient    Signature of Responsible Person                Printed Name of Responsible Person                                      ____________________________________  _____________________________                ________________________________  Signature of Witness        Date  Time         Relationship to Patient    STATEMENT OF PHYSICIAN My signature below affirms that prior to the time of the procedure; I have explained to the patient and/or his/her legal representative, the risks and benefits involved in the proposed treatment and any reasonable alternative to the proposed treatment. I have also explained the risks and benefits involved in refusal of the proposed treatment and  alternatives to the proposed treatment and have answered the patient's questions. If I have a significant financial interest in a co-management agreement or a significant financial interest in any product or implant, or other significant relationship used in this procedure/surgery, I have disclosed this and had a discussion with my patient.     _____________________________________________________              _____________________________  (Signature of Physician)                                                                                         (Date)                                   (Time)  Patient Name: Lennie Cortes Susie      : 1955      Printed: 2024     Medical Record #: [id number]                                      Page 1 of 1

## (undated) NOTE — Clinical Note
Spoke with pt today for TCM--please see notes. Pt confirms 6/04/2024 surgical f/u appt with JANEEN Duran. Offered TCM appt with you-pt declines--prefers to f/u with specialist only, at this time. Sent TE to office staff as FYI/TCM protocol. Thank you.

## (undated) NOTE — LETTER
[facility]  Administration of Anesthesia  I, Lennie Richards agree to be cared for by a physician anesthesiologist alone and/or with a nurse anesthetist, who is specially trained to monitor me and give me medicine to put me to sleep or keep me comfortable during my procedure    I understand that my anesthesiologist and/or anesthetist is not an employee or agent of [facility] or [employer]. He or she works for [facility]    As the patient asking for anesthesia services, I agree to:  Allow the anesthesiologist (anesthesia doctor) to give me medicine and do additional procedures as necessary. Some examples are: Starting or using an “IV” to give me medicine, fluids or blood during my procedure, and having a breathing tube placed to help me breathe when I’m asleep (intubation). In the event that my heart stops working properly, I understand that my anesthesiologist will make every effort to sustain my life, unless otherwise directed by [facility] Do Not Resuscitate documents.  Tell my anesthesia doctor before my procedure:  If I am pregnant.  The last time that I ate or drank.  iii. All of the medicines I take (including prescriptions, herbal supplements, and pills I can buy without a prescription (including street drugs/illegal medications). Failure to inform my anesthesiologist about these medicines may increase my risk of anesthetic complications.  iv.If I am allergic to anything or have had a reaction to anesthesia before.  I understand how the anesthesia medicine will help me (benefits).  I understand that with any type of anesthesia medicine there are risks:  The most common risks are: nausea, vomiting, sore throat, muscle soreness, damage to my eyes, mouth, or teeth (from breathing tube placement).  Rare risks include: remembering what happened during my procedure, allergic reactions to medications, injury to my airway, heart, lungs, vision, nerves,  or muscles and in extremely rare instances death.  My doctor has explained to me other choices available to me for my care (alternatives).  Pregnant Patients (“epidural”):  I understand that the risks of having an epidural (medicine given into my back to help control pain during labor), include itching, low blood pressure, difficulty urinating, headache or slowing of the baby’s heart. Very rare risks include infection, bleeding, seizure, irregular heart rhythms and nerve injury.  Regional Anesthesia (“spinal”, “epidural”, & “nerve blocks”):  I understand that rare but potential complications include headache, bleeding, infection, seizure, irregular heart rhythms, and nerve injury.    _____________________________________________________________________________  Patient (or Representative) Signature/Relationship to Patient  Date   Time    _____________________________________________________________________________   Name (if used)    Language/Organization   Time    _____________________________________________________________________________  Nurse Anesthetist Signature     Date   Time  _____________________________________________________________________________  Anesthesiologist Signature     Date   Time  I have discussed the procedure and information above with the patient (or patient’s representative) and answered their questions. The patient or their representative has agreed to have anesthesia services.    _____________________________________________________________________________  Witness        Date   Time  I have verified that the signature is that of the patient or patient’s representative, and that it was signed before the procedure  Patient Name: Lennie Richards     : 1955                 Printed: 2024 at 7:44 AM    Medical Record #: [id number]                                            Page 1 of 1  ----------ANESTHESIA CONSENT----------

## (undated) NOTE — LETTER
[facility]  Administration of Anesthesia  I, Lennie Richards agree to be cared for by a physician anesthesiologist alone and/or with a nurse anesthetist, who is specially trained to monitor me and give me medicine to put me to sleep or keep me comfortable during my procedure    I understand that my anesthesiologist and/or anesthetist is not an employee or agent of [facility] or [employer]. He or she works for [facility]    As the patient asking for anesthesia services, I agree to:  Allow the anesthesiologist (anesthesia doctor) to give me medicine and do additional procedures as necessary. Some examples are: Starting or using an “IV” to give me medicine, fluids or blood during my procedure, and having a breathing tube placed to help me breathe when I’m asleep (intubation). In the event that my heart stops working properly, I understand that my anesthesiologist will make every effort to sustain my life, unless otherwise directed by [facility] Do Not Resuscitate documents.  Tell my anesthesia doctor before my procedure:  If I am pregnant.  The last time that I ate or drank.  iii. All of the medicines I take (including prescriptions, herbal supplements, and pills I can buy without a prescription (including street drugs/illegal medications). Failure to inform my anesthesiologist about these medicines may increase my risk of anesthetic complications.  iv.If I am allergic to anything or have had a reaction to anesthesia before.  I understand how the anesthesia medicine will help me (benefits).  I understand that with any type of anesthesia medicine there are risks:  The most common risks are: nausea, vomiting, sore throat, muscle soreness, damage to my eyes, mouth, or teeth (from breathing tube placement).  Rare risks include: remembering what happened during my procedure, allergic reactions to medications, injury to my airway, heart, lungs, vision, nerves,  or muscles and in extremely rare instances death.  My doctor has explained to me other choices available to me for my care (alternatives).  Pregnant Patients (“epidural”):  I understand that the risks of having an epidural (medicine given into my back to help control pain during labor), include itching, low blood pressure, difficulty urinating, headache or slowing of the baby’s heart. Very rare risks include infection, bleeding, seizure, irregular heart rhythms and nerve injury.  Regional Anesthesia (“spinal”, “epidural”, & “nerve blocks”):  I understand that rare but potential complications include headache, bleeding, infection, seizure, irregular heart rhythms, and nerve injury.    _____________________________________________________________________________  Patient (or Representative) Signature/Relationship to Patient  Date   Time    _____________________________________________________________________________   Name (if used)    Language/Organization   Time    _____________________________________________________________________________  Nurse Anesthetist Signature     Date   Time  _____________________________________________________________________________  Anesthesiologist Signature     Date   Time  I have discussed the procedure and information above with the patient (or patient’s representative) and answered their questions. The patient or their representative has agreed to have anesthesia services.    _____________________________________________________________________________  Witness        Date   Time  I have verified that the signature is that of the patient or patient’s representative, and that it was signed before the procedure  Patient Name: Lennie Richards     : 1955                 Printed: 2024 at 11:54 AM    Medical Record #: [id number]                                            Page 1 of 1  ----------ANESTHESIA CONSENT----------

## (undated) NOTE — MR AVS SNAPSHOT
[facility]  [facility] [address] [address]  [phone]               Thank you for choosing us for your health care visit with Lonnie Ann MD.  We are glad to serve you and happy to provide you with this summary of your visit.   [facility] recent med list.                Calcium Carbonate-Vitamin D 500-200 MG-UNIT Tabs   Take 1 tablet by mouth daily. Commonly known as:  OSCAL-500           COQ-10 & FISH OIL OR   Take 1 tablet by mouth daily.            escitalopram 10 MG Tabs   Take 10 mg b Please consider the following Lifestyle Modifications. Also, please return for a follow-up Blood Pressure Check in 1 month.      Lifestyle Modification Recommendations:    Modification Recommendation   Weight Reduction Maintain normal body weight (body mas Get your heart pumping – brisk walking, biking, swimming Even 10 minute increments are effective and add up over the week   2 ½ hours per week – spread out over time Use a catalina to keep you motivated   Don’t forget strength training with weights and resist

## (undated) NOTE — ED AVS SNAPSHOT
[facility] Emergency Department    [place] [address]     [address] [place] [address]    Phone:  [phone]    Fax:  [phone] [facility]   MRN: [id number]    Department:  [facility] Emergency Department   Date of Visit:  1/ and Class Registration line at [phone] or find a doctor online by visiting [email or web address].    IF THERE IS ANY CHANGE OR WORSENING OF YOUR CONDITION, CALL YOUR PRIMARY CARE PHYSICIAN AT ONCE OR RETURN IMMEDIATELY TO [address] [place] [address].     If

## (undated) NOTE — LETTER
[facility]  [address], [place], [place]    Authorization for Surgical Operation and Procedure                               I hereby authorize Joanna Wan MD, my physician and his/her assistants (if applicable), which may include medical students, residents, and/or fellows, to perform the following surgical operation/ procedure and administer such anesthesia as may be determined necessary by my physician: Operation/Procedure name (s) ENDOSCOPIC RETROGRADE CHOLANGIOPANCREATOGRAPHY (ERCP) on Lennie Richards   2.   I recognize that during the surgical operation/procedure, unforeseen conditions may necessitate additional or different procedures than those listed above.  I, therefore, further authorize and request that the above-named surgeon, assistants, or designees perform such procedures as are, in their judgment, necessary and desirable.    3.   My surgeon/physician has discussed prior to my surgery the potential benefits, risks and side effects of this procedure; the likelihood of achieving goals; and potential problems that might occur during recuperation.  They also discussed reasonable alternatives to the procedure, including risks, benefits, and side effects related to the alternatives and risks related to not receiving this procedure.  I have had all my questions answered and I acknowledge that no guarantee has been made as to the result that may be obtained.    4.   Should the need arise during my operation/procedure, which includes change of level of care prior to discharge, I also consent to the administration of blood and/or blood products.  Further, I understand that despite careful testing and screening of blood or blood products by collecting agencies, I may still be subject to ill effects as a result of receiving a blood transfusion and/or blood products.  The following are some, but not all, of the potential risks that can occur: fever and allergic reactions, hemolytic  reactions, transmission of diseases such as Hepatitis, AIDS and Cytomegalovirus (CMV) and fluid overload.  In the event that I wish to have an autologous transfusion of my own blood, or a directed donor transfusion, I will discuss this with my physician.  Check only if Refusing Blood or Blood Products  I understand refusal of blood or blood products as deemed necessary by my physician may have serious consequences to my condition to include possible death. I hereby assume responsibility for my refusal and release the hospital, its personnel, and my physicians from any responsibility for the consequences of my refusal.    o  Refuse   5.   I authorize the use of any specimen, organs, tissues, body parts or foreign objects that may be removed from my body during the operation/procedure for diagnosis, research or teaching purposes and their subsequent disposal by hospital authorities.  I also authorize the release of specimen test results and/or written reports to my treating physician on the hospital medical staff or other referring or consulting physicians involved in my care, at the discretion of the Pathologist or my treating physician.    6.   I consent to the photographing or videotaping of the operations or procedures to be performed, including appropriate portions of my body for medical, scientific, or educational purposes, provided my identity is not revealed by the pictures or by descriptive texts accompanying them.  If the procedure has been photographed/videotaped, the surgeon will obtain the original picture, image, videotape or CD.  The hospital will not be responsible for storage, release or maintenance of the picture, image, tape or CD.    7.   I consent to the presence of a  or observers in the operating room as deemed necessary by my physician or their designees.    8.   I recognize that in the event my procedure results in extended X-Ray/fluoroscopy time, I may develop a skin  reaction.    9. If I have a Do Not Attempt Resuscitation (DNAR) order in place, that status will be suspended while in the operating room, procedural suite, and during the recovery period unless otherwise explicitly stated by me (or a person authorized to consent on my behalf). The surgeon or my attending physician will determine when the applicable recovery period ends for purposes of reinstating the DNAR order.  10. Patients having a sterilization procedure: I understand that if the procedure is successful the results will be permanent and it will therefore be impossible for me to inseminate, conceive, or bear children.  I also understand that the procedure is intended to result in sterility, although the result has not been guaranteed.   11. I acknowledge that my physician has explained sedation/analgesia administration to me including the risk and benefits I consent to the administration of sedation/analgesia as may be necessary or desirable in the judgment of my physician.    I CERTIFY THAT I HAVE READ AND FULLY UNDERSTAND THE ABOVE CONSENT TO OPERATION and/or OTHER PROCEDURE.     ____________________________________  _________________________________        ______________________________  Signature of Patient    Signature of Responsible Person                Printed Name of Responsible Person                                      ____________________________________  _____________________________                ________________________________  Signature of Witness        Date  Time         Relationship to Patient    STATEMENT OF PHYSICIAN My signature below affirms that prior to the time of the procedure; I have explained to the patient and/or his/her legal representative, the risks and benefits involved in the proposed treatment and any reasonable alternative to the proposed treatment. I have also explained the risks and benefits involved in refusal of the proposed treatment and alternatives to the proposed  treatment and have answered the patient's questions. If I have a significant financial interest in a co-management agreement or a significant financial interest in any product or implant, or other significant relationship used in this procedure/surgery, I have disclosed this and had a discussion with my patient.     _____________________________________________________              _____________________________  (Signature of Physician)                                                                                         (Date)                                   (Time)  Patient Name: Lennie Cortes Susie      : 1955      Printed: 2024     Medical Record #: [id number]                                      Page 1 of 1

## (undated) NOTE — LETTER
[facility]  [address], [place], [place]    Authorization for Surgical Operation and Procedure                               I hereby authorize Go Mock MD, my physician and his/her assistants (if applicable), which may include medical students, residents, and/or fellows, to perform the following surgical operation/ procedure and administer such anesthesia as may be determined necessary by my physician: Operation/Procedure name (s) Robotic cholecystectomy with cholangiogram, possible open, possible core liver biopsy on Lennie Richards   2.   I recognize that during the surgical operation/procedure, unforeseen conditions may necessitate additional or different procedures than those listed above.  I, therefore, further authorize and request that the above-named surgeon, assistants, or designees perform such procedures as are, in their judgment, necessary and desirable.    3.   My surgeon/physician has discussed prior to my surgery the potential benefits, risks and side effects of this procedure; the likelihood of achieving goals; and potential problems that might occur during recuperation.  They also discussed reasonable alternatives to the procedure, including risks, benefits, and side effects related to the alternatives and risks related to not receiving this procedure.  I have had all my questions answered and I acknowledge that no guarantee has been made as to the result that may be obtained.    4.   Should the need arise during my operation/procedure, which includes change of level of care prior to discharge, I also consent to the administration of blood and/or blood products.  Further, I understand that despite careful testing and screening of blood or blood products by collecting agencies, I may still be subject to ill effects as a result of receiving a blood transfusion and/or blood products.  The following are some, but not all, of the potential risks that can occur:  fever and allergic reactions, hemolytic reactions, transmission of diseases such as Hepatitis, AIDS and Cytomegalovirus (CMV) and fluid overload.  In the event that I wish to have an autologous transfusion of my own blood, or a directed donor transfusion, I will discuss this with my physician.  Check only if Refusing Blood or Blood Products  I understand refusal of blood or blood products as deemed necessary by my physician may have serious consequences to my condition to include possible death. I hereby assume responsibility for my refusal and release the hospital, its personnel, and my physicians from any responsibility for the consequences of my refusal.    o  Refuse   5.   I authorize the use of any specimen, organs, tissues, body parts or foreign objects that may be removed from my body during the operation/procedure for diagnosis, research or teaching purposes and their subsequent disposal by hospital authorities.  I also authorize the release of specimen test results and/or written reports to my treating physician on the hospital medical staff or other referring or consulting physicians involved in my care, at the discretion of the Pathologist or my treating physician.    6.   I consent to the photographing or videotaping of the operations or procedures to be performed, including appropriate portions of my body for medical, scientific, or educational purposes, provided my identity is not revealed by the pictures or by descriptive texts accompanying them.  If the procedure has been photographed/videotaped, the surgeon will obtain the original picture, image, videotape or CD.  The hospital will not be responsible for storage, release or maintenance of the picture, image, tape or CD.    7.   I consent to the presence of a  or observers in the operating room as deemed necessary by my physician or their designees.    8.   I recognize that in the event my procedure results in extended  X-Ray/fluoroscopy time, I may develop a skin reaction.    9. If I have a Do Not Attempt Resuscitation (DNAR) order in place, that status will be suspended while in the operating room, procedural suite, and during the recovery period unless otherwise explicitly stated by me (or a person authorized to consent on my behalf). The surgeon or my attending physician will determine when the applicable recovery period ends for purposes of reinstating the DNAR order.  10. Patients having a sterilization procedure: I understand that if the procedure is successful the results will be permanent and it will therefore be impossible for me to inseminate, conceive, or bear children.  I also understand that the procedure is intended to result in sterility, although the result has not been guaranteed.   11. I acknowledge that my physician has explained sedation/analgesia administration to me including the risk and benefits I consent to the administration of sedation/analgesia as may be necessary or desirable in the judgment of my physician.    I CERTIFY THAT I HAVE READ AND FULLY UNDERSTAND THE ABOVE CONSENT TO OPERATION and/or OTHER PROCEDURE.     ____________________________________  _________________________________        ______________________________  Signature of Patient    Signature of Responsible Person                Printed Name of Responsible Person                                      ____________________________________  _____________________________                ________________________________  Signature of Witness        Date  Time         Relationship to Patient    STATEMENT OF PHYSICIAN My signature below affirms that prior to the time of the procedure; I have explained to the patient and/or his/her legal representative, the risks and benefits involved in the proposed treatment and any reasonable alternative to the proposed treatment. I have also explained the risks and benefits involved in refusal of the proposed  treatment and alternatives to the proposed treatment and have answered the patient's questions. If I have a significant financial interest in a co-management agreement or a significant financial interest in any product or implant, or other significant relationship used in this procedure/surgery, I have disclosed this and had a discussion with my patient.     _____________________________________________________              _____________________________  (Signature of Physician)                                                                                         (Date)                                   (Time)  Patient Name: Lennie Sophia Richards      : 1955      Printed: 2024     Medical Record #: [id number]                                      Page 1 of 1

## (undated) NOTE — LETTER
Hospital Discharge Documentation  Please phone to schedule a hospital follow up appointment.    From: [facility] Hospitalist's Office  Phone: [phone]    Patient discharged time/date: 2024  2:50 PM  Patient discharge disposition:  Home or Self Care       Discharge Summary - D/C Summary        Discharge Summary signed by Rose Tan MD at 2024  2:34 PM  Version 1 of 1      Author: Rose Tan MD Service: Internal Medicine Author Type: Physician    Filed: 2024  2:34 PM Date of Service: 2024  2:04 PM Status: Signed    : Rose Tan MD (Physician)         [facility]  part of [facility]    Discharge Summary    Lennie Richards Patient Status:  Inpatient    1955 MRN [id number]   Location [facility] 4W/SW/SE Attending Rose Tan MD   Hosp Day # 3 PCP Lesa Castellon DO     Date of Admission: 2024   Date of Discharge: 2024    Hospital Discharge Diagnoses: Acute Choledocholithiasis    Lace+ Score: 56  59-90 High Risk  29-58 Medium Risk  0-28   Low Risk.    TCM Follow-Up Recommendation:  LACE > 58: High Risk of readmission after discharge from the hospital.        Admitting Diagnosis: Choledocholithiasis [K80.50]  Biliary calculus of other site with obstruction [K80.81]    Disposition: Home    Discharge Diagnosis: .Principal Problem:    Choledocholithiasis  Active Problems:    Biliary calculus of other site with obstruction      Hospital Course:   Reason for Admission:   Per Dr. Gaytan  This is a 68 year oldfemale who presented complaining of abdominal pain.  Patient states symptoms started around 1 week prior.  Patient described right upper quadrant pain, sharp in nature.  Patient initially was evaluated by her primary care physician who recommended further evaluation with surgery.  Patient saw the surgeon who recommended MRCP.  Patient stated she was called by surgery team after resulting MRCP and noted to signs of  stones in the bile duct and recommended she present to the ED for further evaluation.  At time of interview, patient reports pain is improved with pain medications.  Denies current nausea or vomiting.  Patient otherwise denies chest pain, shortness of breath, fevers or chills.     Discharge Physical Exam:   Physical Exam:    General: No acute distress.   Respiratory: Clear to auscultation bilaterally. No wheezes. No rhonchi.  Cardiovascular: S1, S2. Regular rate and rhythm. No murmurs, rubs or gallops.   Abdomen: Soft, nontender, nondistended.  Positive bowel sounds. No rebound or guarding.  Neurologic: No focal neurological deficits.   Musculoskeletal: Moves all extremities.    Hospital Course:     Choledocholithiasis  -Patient presenting with right upper quadrant pain  -MRCP performed as outpatient noted severe cholelithiasis and choledocholithiasis with dilated common bile duct.  Did not note evidence of acute cholecystitis.  -LFT elevations noted, continue trending  -Continue on IV fluids and empiric IV antibiotics  -Pain control as able, close monitoring with narcotics  -GI consulted  - ERCP done today 5/22- status post sphincterotomy and balloon sweep with complete clearance   - patient will also need cholecystectomy and core liver biopsy- to be done today 5/23  -General surgery on consult- status post  cholecystectomy POD 1     HTN  - controlled  - CPM  - Monitor and adjust as needed     Hyperlipidemia  -Continue statin              Quality:  DVT Prophylaxis: Heparin  CODE status: Full     [email or web address].high    Complications: none    Consultants         Provider   Role Specialty     Joanna Wan MD      Consulting Physician GASTROENTEROLOGY     Go Mock MD      Consulting Physician SURGERY, GENERAL          Surgical Procedures       Case IDs Date Procedure Surgeon Location Status    [id number] 5/22/24 ENDOSCOPIC RETROGRADE CHOLANGIOPANCREATOGRAPHY (ERCP) Joanna Wan MD [facility] ENDOSCOPY Comp    [id number] 5/23/24  Robotic cholecystectomy with cholangiogram, core liver biopsy Go Mock MD [facility] MAIN OR Comp          Pending Labs       Order Current Status    Specimen to Pathology Tissue In process    Anaerobic Culture Preliminary result    Blood Culture Preliminary result    Blood Culture Preliminary result    Tissue Aerobic Culture Preliminary result            Discharge Plan:   Discharge Condition: Stable    Current Discharge Medication List        New Orders    Details   HYDROcodone-acetaminophen 5-325 MG Oral Tab Take 1 tablet by mouth every 4 (four) hours as needed for Pain.      docusate sodium (COLACE) 100 MG Oral Cap Take 1 capsule (100 mg total) by mouth 2 (two) times daily for 7 days.           Home Meds - Unchanged    Details   Phentermine HCl 30 MG Oral Cap Take 1 capsule (30 mg total) by mouth every morning.      estradiol 0.025 MG/24HR Transdermal Patch Biweekly APPLY 1 PATCH TOPICALLY TO THE SKIN 2 TIMES A WEEK      OMEPRAZOLE 40 MG Oral Capsule Delayed Release TAKE 1 CAPSULE BY MOUTH EVERY MORNING AND EVERY NIGHT AT BEDTIME      escitalopram 20 MG Oral Tab Take 1 tablet (20 mg total) by mouth daily.      amLODIPine 5 MG Oral Tab Take 1 tablet (5 mg total) by mouth daily.      LUTEIN OR Take by mouth daily.      Bilberry, Vaccinium myrtillus, (BILBERRY OR) Take by mouth daily.      DHA-EPA-Coenzyme Q10-Vitamin E (COQ-10 & FISH OIL OR) Take 1 tablet by mouth daily.      Glucosamine-Chondroitin (GLUCOSAMINE CHONDR COMPLEX OR) Take 1 tablet by mouth daily.      Multiple Vitamin Oral Tab Take 1 tablet by mouth daily.      Cholecalciferol (VITAMIN D) 1000 UNITS Oral Tab Take  by mouth.      Calcium Carbonate-Vitamin D (OSCAL-500) 500-200 MG-UNIT Oral Tab Take 1 tablet by mouth daily.                 Discharge Diet: As tolerated and General diet    Discharge Activity: As tolerated       Discharge Medications        START taking these medications        Instructions Prescription details   docusate sodium 100 MG  Caps  Commonly known as: Colace      Take 1 capsule (100 mg total) by mouth 2 (two) times daily for 7 days.   Stop taking on: May 30, 2024  Quantity: 14 capsule  Refills: 0     HYDROcodone-acetaminophen 5-325 MG Tabs  Commonly known as: Norco      Take 1 tablet by mouth every 4 (four) hours as needed for Pain.   Quantity: 10 tablet  Refills: 0            CHANGE how you take these medications        Instructions Prescription details   amLODIPine 5 MG Tabs  Commonly known as: Norvasc  What changed: when to take this      Take 1 tablet (5 mg total) by mouth daily.   Quantity: 90 tablet  Refills: 3     escitalopram 20 MG Tabs  Commonly known as: Lexapro  What changed: when to take this      Take 1 tablet (20 mg total) by mouth daily.   Quantity: 90 tablet  Refills: 3     Omeprazole 40 MG Cpdr  What changed: when to take this      TAKE 1 CAPSULE BY MOUTH EVERY MORNING AND EVERY NIGHT AT BEDTIME   Quantity: 180 capsule  Refills: 0            CONTINUE taking these medications        Instructions Prescription details   BILBERRY OR      Take by mouth daily.   Refills: 0     Calcium Carbonate-Vitamin D 500-200 MG-UNIT Tabs  Commonly known as: OSCAL-500      Take 1 tablet by mouth daily.   Refills: 0     COQ-10 & FISH OIL OR      Take 1 tablet by mouth daily.   Refills: 0     estradiol 0.025 MG/24HR Pttw  Commonly known as: Lynetteelle      APPLY 1 PATCH TOPICALLY TO THE SKIN 2 TIMES A WEEK   Quantity: 8 patch  Refills: 11     GLUCOSAMINE CHONDR COMPLEX OR      Take 1 tablet by mouth daily.   Refills: 0     LUTEIN OR      Take by mouth daily.   Refills: 0     Multiple Vitamin Tabs      Take 1 tablet by mouth daily.   Refills: 0     Phentermine HCl 30 MG Caps      Take 1 capsule (30 mg total) by mouth every morning.   Quantity: 30 capsule  Refills: 1     Vitamin D 1000 units Tabs      Take  by mouth.   Refills: 0               Where to Get Your Medications        These medications were sent to [id number] -  [place], [place] - [address] AT [facility], [phone], [phone]  [address], [facility] [address]      Phone: [phone]   docusate sodium 100 MG Caps  HYDROcodone-acetaminophen 5-325 MG Tabs         Follow up:      Follow-up Information       Gigi Duran PA Follow up in 10 day(s).    Specialty: Physician Assistant  Why: Call for follow-up postoperatively, 6.4.24  Contact information:  [address] [facility] [address]  [facility] [id number] [phone]               Lesa Castellon,  Follow up in 1 week(s).    Specialty: Internal Medicine  Contact information:  [address] [id number] [phone]                             Follow up Labs and imaging:         Other Discharge Instructions:          Dr. Go Mock   [phone]    DISCHARGE INSTRUCTIONS- ROBOTIC CHOLECYSTECTOMY      Activity can be resumed as tolerated including walking, stairs, light exercise and driving short distances.  Heavy lifting (i.e. objects > 15-20 lbs.) is to be avoided for 3-4 weeks.  Normal time off work is one to two weeks.    Incisional  pains are commonly of moderate intensity in the first 24-48 hours and gradually improve over the initial post-operative week.  Please take tylenol extra strength 1   500mg tabs every 6 hours around the clock.  Also take  Aleve 1 tablets every 12 hours around the clock.   Prescription pain medication (Norco) should be used initially according to the pain experienced and gradually weaned over the next few days. Please be aware that the prescription pain medication contains the equivalent to 1 dose of Tylenol. Do not exceed 3000mg of Tylenol in a 24 hour period. Prescription pain medication can make you nauseated, light-headed and constipated: it should be taken with food and discontinued if side-effects develop.  A prescription for  stool softener  (Colace) is helpful to avoid constipation. Take 1 orally twice a day.   If   no bowel movement within 48 hours, MOM 30 mls may be helpful.    Your diet should consist primarily of liquids until you have a good appetite, usually the first day after surgery.  Fatty or fried foods should be avoided in the first week or two after surgery but subsequently a general diet may be resumed.    The dressing should not be removed until the first office visit.  You may shower in 24 hours, no bath or swimming for 4 weeks from your surgery date.  Apply an ice bag over your dressing for 72 hours.  No driving for 5 days or until off pain medication.   If the gauze dressing gets wet remove the Tegaderm and gauze but leave the Steri-Strips in place.    Activity after surgery  After surgery, take it easy for the rest of the day. If you had general anesthesia, don’t use machinery or power tools, drink alcohol, or make any major decisions for at least the first 24 hours.  Don’t drive while you are still taking opioid pain medication, and don’t drive u.ntil you are able to step firmly on the brake pedal without hesitation.  Ask others to help with chores and errands while you recover.  Don’t lift anything heavier than 10 pounds until your doctor says it’s OK.  Don’t mow the lawn, shovel snow, use a vacuum , or do other strenuous activities until your doctor says it’s okay.  Walk as often as you feel able.  Continue the coughing and deep breathing exercises that you learned in the hospital.  Ask your doctor when you can expect to return to work.  Avoid constipation:  Eat fruits, vegetables, and whole grains   Drink 6-8 glasses of water a day, unless otherwise instructed.  Use a laxative or a mild stool softener as instructed by your doctor.  Call your doctor, or the 24-hour answering service, immediately if you have any of the following:  Yellowing of your eyes or skin (jaundice)  Chills  Fever above 101.5°F or 38.5°C    Redness, swelling, increasing pain, pus, or a foul smell at the incision site  Dark or  rust-colored urine  Stool that is nely-colored or light in color instead of brown  Increasing abdominal pain  persistent nausea or bowel problems, uncontrolled pain or poor appetite     Contact the office tomorrow to make a follow appointment with Gigi Duran    for 6.4.24 after surgery.    Instructions given by EVELYN Zhou PA-C  [facility]  [address]  [place] [address]  [phone]  [phone]      Go Mock MD. [facility] SURGERY  [facility]  OFFICE [phone]    5/23/2024  1:29 PM          POST-ERCP DISCHARGE INSTRUCTIONS      PROCEDURE PERFORMED:  ERCP with sphincterotomy and balloon sweep    ENDOSCOPIST: Joanna Wan MD    FINDINGS:  Choledocholithiasis    MEDICATIONS: You may resume all other medications today    DIET: You may resume your regular diet today.    BIOPSIES: No biopsies were taken    X-RAYS: No X-rays/Labs were ordered today        Activity for remainder of today:  REST TODAY  DO NOT drive or operate heavy machinery  DO NOT drink any alcoholic beverages  DO NOT sign any legal documents or make any important decisions    After your procedure(s):  It is not unusual to feel bloated or gassy .  Passing gas and belching is encouraged. Lying on your left side with your knees flexed may relieve the discomfort. A hot pack to the abdomen may also help. If you had an upper endoscopy (EGD), you may experience a slight sore throat which will subside. Throat lozenges or salt water gargle can be used.    FOLLOW-UP:  Contact the office at [phone] if a follow-up appointment is needed or if you develop any of the following:    Severe abdominal pain/discomfort   Excessive bleeding  Black tarry stool  Difficulty breathing/swallowing  Persistent nausea/vomiting    Fever above 100 degrees or chills                      Time spent:  > 30 minutes    WING PORTILLO MD  5/24/2024      Electronically signed by  Rose Tan MD on 5/24/2024  2:34 PM